# Patient Record
Sex: FEMALE | Race: WHITE | NOT HISPANIC OR LATINO | Employment: OTHER | URBAN - METROPOLITAN AREA
[De-identification: names, ages, dates, MRNs, and addresses within clinical notes are randomized per-mention and may not be internally consistent; named-entity substitution may affect disease eponyms.]

---

## 2020-08-31 ENCOUNTER — APPOINTMENT (OUTPATIENT)
Dept: CT IMAGING | Facility: HOSPITAL | Age: 60
End: 2020-08-31

## 2020-08-31 ENCOUNTER — HOSPITAL ENCOUNTER (INPATIENT)
Facility: HOSPITAL | Age: 60
LOS: 4 days | Discharge: HOME | End: 2020-09-05
Attending: EMERGENCY MEDICINE | Admitting: HOSPITALIST

## 2020-08-31 DIAGNOSIS — R10.12 LEFT UPPER QUADRANT PAIN: ICD-10-CM

## 2020-08-31 DIAGNOSIS — D73.5 SPLENIC INFARCTION: Primary | ICD-10-CM

## 2020-08-31 LAB
ALBUMIN SERPL-MCNC: 4.3 G/DL (ref 3.5–5.2)
ALBUMIN/GLOB SERPL: 1.4 G/DL
ALP SERPL-CCNC: 130 U/L (ref 39–117)
ALT SERPL W P-5'-P-CCNC: 28 U/L (ref 1–33)
ANION GAP SERPL CALCULATED.3IONS-SCNC: 11.4 MMOL/L (ref 5–15)
AST SERPL-CCNC: 17 U/L (ref 1–32)
BACTERIA UR QL AUTO: NORMAL /HPF
BASOPHILS # BLD AUTO: 0.03 10*3/MM3 (ref 0–0.2)
BASOPHILS NFR BLD AUTO: 0.4 % (ref 0–1.5)
BILIRUB SERPL-MCNC: 0.4 MG/DL (ref 0–1.2)
BILIRUB UR QL STRIP: NEGATIVE
BUN SERPL-MCNC: 7 MG/DL (ref 8–23)
BUN/CREAT SERPL: 10.6 (ref 7–25)
CALCIUM SPEC-SCNC: 10 MG/DL (ref 8.6–10.5)
CHLORIDE SERPL-SCNC: 97 MMOL/L (ref 98–107)
CLARITY UR: CLEAR
CO2 SERPL-SCNC: 25.6 MMOL/L (ref 22–29)
COLOR UR: YELLOW
CREAT SERPL-MCNC: 0.66 MG/DL (ref 0.57–1)
DEPRECATED RDW RBC AUTO: 38.7 FL (ref 37–54)
EOSINOPHIL # BLD AUTO: 0.15 10*3/MM3 (ref 0–0.4)
EOSINOPHIL NFR BLD AUTO: 1.8 % (ref 0.3–6.2)
ERYTHROCYTE [DISTWIDTH] IN BLOOD BY AUTOMATED COUNT: 12.2 % (ref 12.3–15.4)
GFR SERPL CREATININE-BSD FRML MDRD: 110 ML/MIN/1.73
GFR SERPL CREATININE-BSD FRML MDRD: 91 ML/MIN/1.73
GLOBULIN UR ELPH-MCNC: 3 GM/DL
GLUCOSE SERPL-MCNC: 267 MG/DL (ref 65–99)
GLUCOSE UR STRIP-MCNC: NEGATIVE MG/DL
HCT VFR BLD AUTO: 40.9 % (ref 34–46.6)
HGB BLD-MCNC: 14.2 G/DL (ref 12–15.9)
HGB UR QL STRIP.AUTO: NEGATIVE
HOLD SPECIMEN: NORMAL
HOLD SPECIMEN: NORMAL
HYALINE CASTS UR QL AUTO: NORMAL /LPF
IMM GRANULOCYTES # BLD AUTO: 0.05 10*3/MM3 (ref 0–0.05)
IMM GRANULOCYTES NFR BLD AUTO: 0.6 % (ref 0–0.5)
KETONES UR QL STRIP: NEGATIVE
LEUKOCYTE ESTERASE UR QL STRIP.AUTO: ABNORMAL
LIPASE SERPL-CCNC: 11 U/L (ref 13–60)
LYMPHOCYTES # BLD AUTO: 2.09 10*3/MM3 (ref 0.7–3.1)
LYMPHOCYTES NFR BLD AUTO: 25 % (ref 19.6–45.3)
MCH RBC QN AUTO: 30.3 PG (ref 26.6–33)
MCHC RBC AUTO-ENTMCNC: 34.7 G/DL (ref 31.5–35.7)
MCV RBC AUTO: 87.4 FL (ref 79–97)
MONOCYTES # BLD AUTO: 0.57 10*3/MM3 (ref 0.1–0.9)
MONOCYTES NFR BLD AUTO: 6.8 % (ref 5–12)
NEUTROPHILS NFR BLD AUTO: 5.46 10*3/MM3 (ref 1.7–7)
NEUTROPHILS NFR BLD AUTO: 65.4 % (ref 42.7–76)
NITRITE UR QL STRIP: NEGATIVE
NRBC BLD AUTO-RTO: 0 /100 WBC (ref 0–0.2)
PH UR STRIP.AUTO: 5.5 [PH] (ref 5–8)
PLATELET # BLD AUTO: 202 10*3/MM3 (ref 140–450)
PMV BLD AUTO: 9.7 FL (ref 6–12)
POTASSIUM SERPL-SCNC: 3.7 MMOL/L (ref 3.5–5.2)
PROT SERPL-MCNC: 7.3 G/DL (ref 6–8.5)
PROT UR QL STRIP: NEGATIVE
RBC # BLD AUTO: 4.68 10*6/MM3 (ref 3.77–5.28)
RBC # UR: NORMAL /HPF
REF LAB TEST METHOD: NORMAL
SODIUM SERPL-SCNC: 134 MMOL/L (ref 136–145)
SP GR UR STRIP: 1.01 (ref 1–1.03)
SQUAMOUS #/AREA URNS HPF: NORMAL /HPF
UROBILINOGEN UR QL STRIP: ABNORMAL
WBC # BLD AUTO: 8.35 10*3/MM3 (ref 3.4–10.8)
WBC UR QL AUTO: NORMAL /HPF
WHOLE BLOOD HOLD SPECIMEN: NORMAL
WHOLE BLOOD HOLD SPECIMEN: NORMAL

## 2020-08-31 PROCEDURE — 25010000002 ONDANSETRON PER 1 MG: Performed by: NURSE PRACTITIONER

## 2020-08-31 PROCEDURE — 81001 URINALYSIS AUTO W/SCOPE: CPT

## 2020-08-31 PROCEDURE — 99285 EMERGENCY DEPT VISIT HI MDM: CPT

## 2020-08-31 PROCEDURE — 74177 CT ABD & PELVIS W/CONTRAST: CPT

## 2020-08-31 PROCEDURE — 83690 ASSAY OF LIPASE: CPT

## 2020-08-31 PROCEDURE — 80053 COMPREHEN METABOLIC PANEL: CPT

## 2020-08-31 PROCEDURE — 85025 COMPLETE CBC W/AUTO DIFF WBC: CPT

## 2020-08-31 RX ORDER — SODIUM CHLORIDE 0.9 % (FLUSH) 0.9 %
10 SYRINGE (ML) INJECTION AS NEEDED
Status: DISCONTINUED | OUTPATIENT
Start: 2020-08-31 | End: 2020-09-05 | Stop reason: HOSPADM

## 2020-08-31 RX ORDER — ONDANSETRON 2 MG/ML
4 INJECTION INTRAMUSCULAR; INTRAVENOUS ONCE
Status: COMPLETED | OUTPATIENT
Start: 2020-08-31 | End: 2020-08-31

## 2020-08-31 RX ORDER — PHENAZOPYRIDINE HYDROCHLORIDE 100 MG/1
100 TABLET, FILM COATED ORAL 3 TIMES DAILY PRN
COMMUNITY
End: 2020-09-05 | Stop reason: HOSPADM

## 2020-08-31 RX ORDER — NITROFURANTOIN 25; 75 MG/1; MG/1
100 CAPSULE ORAL 2 TIMES DAILY
COMMUNITY
End: 2020-09-05 | Stop reason: HOSPADM

## 2020-08-31 RX ORDER — MORPHINE SULFATE 2 MG/ML
4 INJECTION, SOLUTION INTRAMUSCULAR; INTRAVENOUS ONCE
Status: COMPLETED | OUTPATIENT
Start: 2020-08-31 | End: 2020-09-01

## 2020-08-31 RX ORDER — CEPHALEXIN 250 MG/1
250 CAPSULE ORAL 4 TIMES DAILY
COMMUNITY
End: 2020-09-05 | Stop reason: HOSPADM

## 2020-08-31 RX ADMIN — ONDANSETRON 4 MG: 2 INJECTION INTRAMUSCULAR; INTRAVENOUS at 23:34

## 2020-09-01 ENCOUNTER — APPOINTMENT (OUTPATIENT)
Dept: CARDIOLOGY | Facility: HOSPITAL | Age: 60
End: 2020-09-01

## 2020-09-01 PROBLEM — G47.33 OSA (OBSTRUCTIVE SLEEP APNEA): Status: ACTIVE | Noted: 2020-09-01

## 2020-09-01 PROBLEM — E11.9 TYPE 2 DIABETES MELLITUS, WITHOUT LONG-TERM CURRENT USE OF INSULIN: Status: ACTIVE | Noted: 2020-09-01

## 2020-09-01 PROBLEM — D73.5 SPLENIC INFARCTION: Status: ACTIVE | Noted: 2020-09-01

## 2020-09-01 PROBLEM — I73.9 PAD (PERIPHERAL ARTERY DISEASE): Status: ACTIVE | Noted: 2020-09-01

## 2020-09-01 LAB
APTT PPP: 26.2 SECONDS (ref 22.7–35.4)
APTT PPP: 44.7 SECONDS (ref 22.7–35.4)
APTT PPP: 68.5 SECONDS (ref 22.7–35.4)
BASOPHILS # BLD AUTO: 0.02 10*3/MM3 (ref 0–0.2)
BASOPHILS NFR BLD AUTO: 0.3 % (ref 0–1.5)
BH CV ECHO MEAS - ACS: 1.7 CM
BH CV ECHO MEAS - AO MAX PG (FULL): 4.1 MMHG
BH CV ECHO MEAS - AO MAX PG: 8.9 MMHG
BH CV ECHO MEAS - AO MEAN PG (FULL): 1.8 MMHG
BH CV ECHO MEAS - AO MEAN PG: 4.4 MMHG
BH CV ECHO MEAS - AO ROOT AREA (BSA CORRECTED): 1.2
BH CV ECHO MEAS - AO ROOT AREA: 4.9 CM^2
BH CV ECHO MEAS - AO ROOT DIAM: 2.5 CM
BH CV ECHO MEAS - AO V2 MAX: 149.4 CM/SEC
BH CV ECHO MEAS - AO V2 MEAN: 96.3 CM/SEC
BH CV ECHO MEAS - AO V2 VTI: 33.1 CM
BH CV ECHO MEAS - ASC AORTA: 2.5 CM
BH CV ECHO MEAS - AVA(I,A): 2.2 CM^2
BH CV ECHO MEAS - AVA(I,D): 2.2 CM^2
BH CV ECHO MEAS - AVA(V,A): 2.1 CM^2
BH CV ECHO MEAS - AVA(V,D): 2.1 CM^2
BH CV ECHO MEAS - BSA(HAYCOCK): 2.2 M^2
BH CV ECHO MEAS - BSA: 2.1 M^2
BH CV ECHO MEAS - BZI_BMI: 32.5 KILOGRAMS/M^2
BH CV ECHO MEAS - BZI_METRIC_HEIGHT: 172.7 CM
BH CV ECHO MEAS - BZI_METRIC_WEIGHT: 97.1 KG
BH CV ECHO MEAS - EDV(CUBED): 58.2 ML
BH CV ECHO MEAS - EDV(MOD-SP2): 91 ML
BH CV ECHO MEAS - EDV(MOD-SP4): 64 ML
BH CV ECHO MEAS - EDV(TEICH): 65 ML
BH CV ECHO MEAS - EF(CUBED): 76.1 %
BH CV ECHO MEAS - EF(MOD-BP): 68 %
BH CV ECHO MEAS - EF(MOD-SP2): 69.2 %
BH CV ECHO MEAS - EF(MOD-SP4): 67.2 %
BH CV ECHO MEAS - EF(TEICH): 68.8 %
BH CV ECHO MEAS - ESV(CUBED): 13.9 ML
BH CV ECHO MEAS - ESV(MOD-SP2): 28 ML
BH CV ECHO MEAS - ESV(MOD-SP4): 21 ML
BH CV ECHO MEAS - ESV(TEICH): 20.3 ML
BH CV ECHO MEAS - FS: 37.9 %
BH CV ECHO MEAS - IVS/LVPW: 0.96
BH CV ECHO MEAS - IVSD: 1.2 CM
BH CV ECHO MEAS - LAT PEAK E' VEL: 8.7 CM/SEC
BH CV ECHO MEAS - LV DIASTOLIC VOL/BSA (35-75): 30.4 ML/M^2
BH CV ECHO MEAS - LV MASS(C)D: 153.1 GRAMS
BH CV ECHO MEAS - LV MASS(C)DI: 72.8 GRAMS/M^2
BH CV ECHO MEAS - LV MAX PG: 4.9 MMHG
BH CV ECHO MEAS - LV MEAN PG: 2.7 MMHG
BH CV ECHO MEAS - LV SYSTOLIC VOL/BSA (12-30): 10 ML/M^2
BH CV ECHO MEAS - LV V1 MAX: 110.2 CM/SEC
BH CV ECHO MEAS - LV V1 MEAN: 76.5 CM/SEC
BH CV ECHO MEAS - LV V1 VTI: 25.9 CM
BH CV ECHO MEAS - LVIDD: 3.9 CM
BH CV ECHO MEAS - LVIDS: 2.4 CM
BH CV ECHO MEAS - LVLD AP2: 8.1 CM
BH CV ECHO MEAS - LVLD AP4: 8.1 CM
BH CV ECHO MEAS - LVLS AP2: 5.9 CM
BH CV ECHO MEAS - LVLS AP4: 6.2 CM
BH CV ECHO MEAS - LVOT AREA (M): 2.8 CM^2
BH CV ECHO MEAS - LVOT AREA: 2.9 CM^2
BH CV ECHO MEAS - LVOT DIAM: 1.9 CM
BH CV ECHO MEAS - LVPWD: 1.2 CM
BH CV ECHO MEAS - MED PEAK E' VEL: 6.9 CM/SEC
BH CV ECHO MEAS - MV A DUR: 0.13 SEC
BH CV ECHO MEAS - MV A MAX VEL: 68.9 CM/SEC
BH CV ECHO MEAS - MV DEC SLOPE: 445 CM/SEC^2
BH CV ECHO MEAS - MV DEC TIME: 194 SEC
BH CV ECHO MEAS - MV E MAX VEL: 86.3 CM/SEC
BH CV ECHO MEAS - MV E/A: 1.3
BH CV ECHO MEAS - MV MAX PG: 3 MMHG
BH CV ECHO MEAS - MV MEAN PG: 0.91 MMHG
BH CV ECHO MEAS - MV P1/2T MAX VEL: 86 CM/SEC
BH CV ECHO MEAS - MV P1/2T: 56.6 MSEC
BH CV ECHO MEAS - MV V2 MAX: 86.9 CM/SEC
BH CV ECHO MEAS - MV V2 MEAN: 42.8 CM/SEC
BH CV ECHO MEAS - MV V2 VTI: 29.6 CM
BH CV ECHO MEAS - MVA P1/2T LCG: 2.6 CM^2
BH CV ECHO MEAS - MVA(P1/2T): 3.9 CM^2
BH CV ECHO MEAS - MVA(VTI): 2.5 CM^2
BH CV ECHO MEAS - PA ACC TIME: 0.15 SEC
BH CV ECHO MEAS - PA MAX PG (FULL): 0.32 MMHG
BH CV ECHO MEAS - PA MAX PG: 2.7 MMHG
BH CV ECHO MEAS - PA PR(ACCEL): 10.1 MMHG
BH CV ECHO MEAS - PA V2 MAX: 82.5 CM/SEC
BH CV ECHO MEAS - PULM A REVS DUR: 0.11 SEC
BH CV ECHO MEAS - PULM A REVS VEL: 26.2 CM/SEC
BH CV ECHO MEAS - PULM DIAS VEL: 35.7 CM/SEC
BH CV ECHO MEAS - PULM S/D: 1.5
BH CV ECHO MEAS - PULM SYS VEL: 54.3 CM/SEC
BH CV ECHO MEAS - RAP SYSTOLE: 8 MMHG
BH CV ECHO MEAS - RV MAX PG: 2.4 MMHG
BH CV ECHO MEAS - RV MEAN PG: 1.3 MMHG
BH CV ECHO MEAS - RV V1 MAX: 77.6 CM/SEC
BH CV ECHO MEAS - RV V1 MEAN: 53.6 CM/SEC
BH CV ECHO MEAS - RV V1 VTI: 15.6 CM
BH CV ECHO MEAS - SI(AO): 77.5 ML/M^2
BH CV ECHO MEAS - SI(CUBED): 21.1 ML/M^2
BH CV ECHO MEAS - SI(LVOT): 35.1 ML/M^2
BH CV ECHO MEAS - SI(MOD-SP2): 29.9 ML/M^2
BH CV ECHO MEAS - SI(MOD-SP4): 20.4 ML/M^2
BH CV ECHO MEAS - SI(TEICH): 21.2 ML/M^2
BH CV ECHO MEAS - SV(AO): 163 ML
BH CV ECHO MEAS - SV(CUBED): 44.3 ML
BH CV ECHO MEAS - SV(LVOT): 73.7 ML
BH CV ECHO MEAS - SV(MOD-SP2): 63 ML
BH CV ECHO MEAS - SV(MOD-SP4): 43 ML
BH CV ECHO MEAS - SV(TEICH): 44.7 ML
BH CV ECHO MEAS - TAPSE (>1.6): 2.1 CM2
BH CV ECHO MEASUREMENTS AVERAGE E/E' RATIO: 11.06
BH CV LOWER VASCULAR LEFT COMMON FEMORAL AUGMENT: NORMAL
BH CV LOWER VASCULAR LEFT COMMON FEMORAL COMPETENT: NORMAL
BH CV LOWER VASCULAR LEFT COMMON FEMORAL COMPRESS: NORMAL
BH CV LOWER VASCULAR LEFT COMMON FEMORAL PHASIC: NORMAL
BH CV LOWER VASCULAR LEFT COMMON FEMORAL SPONT: NORMAL
BH CV LOWER VASCULAR LEFT DISTAL FEMORAL COMPRESS: NORMAL
BH CV LOWER VASCULAR LEFT GASTRONEMIUS COMPRESS: NORMAL
BH CV LOWER VASCULAR LEFT GREATER SAPH AK COMPRESS: NORMAL
BH CV LOWER VASCULAR LEFT GREATER SAPH BK COMPRESS: NORMAL
BH CV LOWER VASCULAR LEFT LESSER SAPH COMPRESS: NORMAL
BH CV LOWER VASCULAR LEFT MID FEMORAL AUGMENT: NORMAL
BH CV LOWER VASCULAR LEFT MID FEMORAL COMPETENT: NORMAL
BH CV LOWER VASCULAR LEFT MID FEMORAL COMPRESS: NORMAL
BH CV LOWER VASCULAR LEFT MID FEMORAL PHASIC: NORMAL
BH CV LOWER VASCULAR LEFT MID FEMORAL SPONT: NORMAL
BH CV LOWER VASCULAR LEFT PERONEAL COMPRESS: NORMAL
BH CV LOWER VASCULAR LEFT POPLITEAL AUGMENT: NORMAL
BH CV LOWER VASCULAR LEFT POPLITEAL COMPETENT: NORMAL
BH CV LOWER VASCULAR LEFT POPLITEAL COMPRESS: NORMAL
BH CV LOWER VASCULAR LEFT POPLITEAL PHASIC: NORMAL
BH CV LOWER VASCULAR LEFT POPLITEAL SPONT: NORMAL
BH CV LOWER VASCULAR LEFT POSTERIOR TIBIAL COMPRESS: NORMAL
BH CV LOWER VASCULAR LEFT PROFUNDA FEMORAL COMPRESS: NORMAL
BH CV LOWER VASCULAR LEFT PROXIMAL FEMORAL COMPRESS: NORMAL
BH CV LOWER VASCULAR LEFT SAPHENOFEMORAL JUNCTION COMPRESS: NORMAL
BH CV LOWER VASCULAR RIGHT COMMON FEMORAL AUGMENT: NORMAL
BH CV LOWER VASCULAR RIGHT COMMON FEMORAL COMPETENT: NORMAL
BH CV LOWER VASCULAR RIGHT COMMON FEMORAL COMPRESS: NORMAL
BH CV LOWER VASCULAR RIGHT COMMON FEMORAL PHASIC: NORMAL
BH CV LOWER VASCULAR RIGHT COMMON FEMORAL SPONT: NORMAL
BH CV LOWER VASCULAR RIGHT DISTAL FEMORAL COMPRESS: NORMAL
BH CV LOWER VASCULAR RIGHT GASTRONEMIUS COMPRESS: NORMAL
BH CV LOWER VASCULAR RIGHT GREATER SAPH AK COMPRESS: NORMAL
BH CV LOWER VASCULAR RIGHT GREATER SAPH BK COMPRESS: NORMAL
BH CV LOWER VASCULAR RIGHT LESSER SAPH COMPRESS: NORMAL
BH CV LOWER VASCULAR RIGHT MID FEMORAL AUGMENT: NORMAL
BH CV LOWER VASCULAR RIGHT MID FEMORAL COMPETENT: NORMAL
BH CV LOWER VASCULAR RIGHT MID FEMORAL COMPRESS: NORMAL
BH CV LOWER VASCULAR RIGHT MID FEMORAL PHASIC: NORMAL
BH CV LOWER VASCULAR RIGHT MID FEMORAL SPONT: NORMAL
BH CV LOWER VASCULAR RIGHT PERONEAL COMPRESS: NORMAL
BH CV LOWER VASCULAR RIGHT POPLITEAL AUGMENT: NORMAL
BH CV LOWER VASCULAR RIGHT POPLITEAL COMPETENT: NORMAL
BH CV LOWER VASCULAR RIGHT POPLITEAL COMPRESS: NORMAL
BH CV LOWER VASCULAR RIGHT POPLITEAL PHASIC: NORMAL
BH CV LOWER VASCULAR RIGHT POPLITEAL SPONT: NORMAL
BH CV LOWER VASCULAR RIGHT POSTERIOR TIBIAL COMPRESS: NORMAL
BH CV LOWER VASCULAR RIGHT PROFUNDA FEMORAL COMPRESS: NORMAL
BH CV LOWER VASCULAR RIGHT PROXIMAL FEMORAL COMPRESS: NORMAL
BH CV LOWER VASCULAR RIGHT SAPHENOFEMORAL JUNCTION COMPRESS: NORMAL
BH CV VAS BP RIGHT ARM: NORMAL MMHG
BH CV XLRA - RV BASE: 2.9 CM
BH CV XLRA - RV LENGTH: 7.2 CM
BH CV XLRA - RV MID: 2.5 CM
BH CV XLRA - TDI S': 13.9 CM/SEC
D DIMER PPP FEU-MCNC: <0.27 MCGFEU/ML (ref 0–0.49)
DEPRECATED RDW RBC AUTO: 41.1 FL (ref 37–54)
EOSINOPHIL # BLD AUTO: 0.11 10*3/MM3 (ref 0–0.4)
EOSINOPHIL NFR BLD AUTO: 1.6 % (ref 0.3–6.2)
ERYTHROCYTE [DISTWIDTH] IN BLOOD BY AUTOMATED COUNT: 12.6 % (ref 12.3–15.4)
F5 GENE MUT ANL BLD/T: NORMAL
GLUCOSE BLDC GLUCOMTR-MCNC: 250 MG/DL (ref 70–130)
HCT VFR BLD AUTO: 37.7 % (ref 34–46.6)
HCYS SERPL-MCNC: 7.2 UMOL/L (ref 0–15)
HGB BLD-MCNC: 12.7 G/DL (ref 12–15.9)
IMM GRANULOCYTES # BLD AUTO: 0.03 10*3/MM3 (ref 0–0.05)
IMM GRANULOCYTES NFR BLD AUTO: 0.4 % (ref 0–0.5)
INR PPP: 0.98 (ref 0.9–1.1)
LEFT ATRIUM VOLUME INDEX: 15 ML/M2
LYMPHOCYTES # BLD AUTO: 1.67 10*3/MM3 (ref 0.7–3.1)
LYMPHOCYTES NFR BLD AUTO: 24.2 % (ref 19.6–45.3)
MAXIMAL PREDICTED HEART RATE: 158 BPM
MCH RBC QN AUTO: 30 PG (ref 26.6–33)
MCHC RBC AUTO-ENTMCNC: 33.7 G/DL (ref 31.5–35.7)
MCV RBC AUTO: 89.1 FL (ref 79–97)
MONOCYTES # BLD AUTO: 0.45 10*3/MM3 (ref 0.1–0.9)
MONOCYTES NFR BLD AUTO: 6.5 % (ref 5–12)
NEUTROPHILS NFR BLD AUTO: 4.63 10*3/MM3 (ref 1.7–7)
NEUTROPHILS NFR BLD AUTO: 67 % (ref 42.7–76)
NRBC BLD AUTO-RTO: 0 /100 WBC (ref 0–0.2)
PLATELET # BLD AUTO: 184 10*3/MM3 (ref 140–450)
PMV BLD AUTO: 9.4 FL (ref 6–12)
PROTHROMBIN TIME: 12.9 SECONDS (ref 11.7–14.2)
RBC # BLD AUTO: 4.23 10*6/MM3 (ref 3.77–5.28)
REF LAB TEST METHOD: NORMAL
SARS-COV-2 RNA RESP QL NAA+PROBE: NOT DETECTED
STRESS TARGET HR: 134 BPM
WBC # BLD AUTO: 6.91 10*3/MM3 (ref 3.4–10.8)

## 2020-09-01 PROCEDURE — 93970 EXTREMITY STUDY: CPT

## 2020-09-01 PROCEDURE — 83520 IMMUNOASSAY QUANT NOS NONAB: CPT | Performed by: NURSE PRACTITIONER

## 2020-09-01 PROCEDURE — 25010000002 MORPHINE PER 10 MG: Performed by: NURSE PRACTITIONER

## 2020-09-01 PROCEDURE — 85705 THROMBOPLASTIN INHIBITION: CPT | Performed by: NURSE PRACTITIONER

## 2020-09-01 PROCEDURE — 36415 COLL VENOUS BLD VENIPUNCTURE: CPT | Performed by: NURSE PRACTITIONER

## 2020-09-01 PROCEDURE — 25010000002 ONDANSETRON PER 1 MG: Performed by: NURSE PRACTITIONER

## 2020-09-01 PROCEDURE — 85730 THROMBOPLASTIN TIME PARTIAL: CPT | Performed by: INTERNAL MEDICINE

## 2020-09-01 PROCEDURE — 85025 COMPLETE CBC W/AUTO DIFF WBC: CPT | Performed by: NURSE PRACTITIONER

## 2020-09-01 PROCEDURE — 85730 THROMBOPLASTIN TIME PARTIAL: CPT | Performed by: NURSE PRACTITIONER

## 2020-09-01 PROCEDURE — 83090 ASSAY OF HOMOCYSTEINE: CPT | Performed by: NURSE PRACTITIONER

## 2020-09-01 PROCEDURE — 25010000002 MORPHINE PER 10 MG: Performed by: INTERNAL MEDICINE

## 2020-09-01 PROCEDURE — 85613 RUSSELL VIPER VENOM DILUTED: CPT | Performed by: NURSE PRACTITIONER

## 2020-09-01 PROCEDURE — 85300 ANTITHROMBIN III ACTIVITY: CPT | Performed by: NURSE PRACTITIONER

## 2020-09-01 PROCEDURE — 85610 PROTHROMBIN TIME: CPT | Performed by: NURSE PRACTITIONER

## 2020-09-01 PROCEDURE — 86147 CARDIOLIPIN ANTIBODY EA IG: CPT | Performed by: NURSE PRACTITIONER

## 2020-09-01 PROCEDURE — 93010 ELECTROCARDIOGRAM REPORT: CPT | Performed by: INTERNAL MEDICINE

## 2020-09-01 PROCEDURE — 85379 FIBRIN DEGRADATION QUANT: CPT | Performed by: NURSE PRACTITIONER

## 2020-09-01 PROCEDURE — 93005 ELECTROCARDIOGRAM TRACING: CPT | Performed by: NURSE PRACTITIONER

## 2020-09-01 PROCEDURE — 99222 1ST HOSP IP/OBS MODERATE 55: CPT | Performed by: STUDENT IN AN ORGANIZED HEALTH CARE EDUCATION/TRAINING PROGRAM

## 2020-09-01 PROCEDURE — 25010000002 IOPAMIDOL 61 % SOLUTION: Performed by: EMERGENCY MEDICINE

## 2020-09-01 PROCEDURE — 93306 TTE W/DOPPLER COMPLETE: CPT

## 2020-09-01 PROCEDURE — 86038 ANTINUCLEAR ANTIBODIES: CPT | Performed by: NURSE PRACTITIONER

## 2020-09-01 PROCEDURE — 85670 THROMBIN TIME PLASMA: CPT | Performed by: NURSE PRACTITIONER

## 2020-09-01 PROCEDURE — 81241 F5 GENE: CPT | Performed by: NURSE PRACTITIONER

## 2020-09-01 PROCEDURE — 93306 TTE W/DOPPLER COMPLETE: CPT | Performed by: INTERNAL MEDICINE

## 2020-09-01 PROCEDURE — U0004 COV-19 TEST NON-CDC HGH THRU: HCPCS | Performed by: NURSE PRACTITIONER

## 2020-09-01 PROCEDURE — 25010000002 HEPARIN (PORCINE) PER 1000 UNITS: Performed by: NURSE PRACTITIONER

## 2020-09-01 PROCEDURE — 82962 GLUCOSE BLOOD TEST: CPT

## 2020-09-01 PROCEDURE — 85732 THROMBOPLASTIN TIME PARTIAL: CPT | Performed by: NURSE PRACTITIONER

## 2020-09-01 RX ORDER — MORPHINE SULFATE 2 MG/ML
2 INJECTION, SOLUTION INTRAMUSCULAR; INTRAVENOUS ONCE
Status: COMPLETED | OUTPATIENT
Start: 2020-09-01 | End: 2020-09-01

## 2020-09-01 RX ORDER — NALOXONE HCL 0.4 MG/ML
0.4 VIAL (ML) INJECTION
Status: DISCONTINUED | OUTPATIENT
Start: 2020-09-01 | End: 2020-09-05 | Stop reason: HOSPADM

## 2020-09-01 RX ORDER — MORPHINE SULFATE 2 MG/ML
2 INJECTION, SOLUTION INTRAMUSCULAR; INTRAVENOUS EVERY 4 HOURS PRN
Status: DISCONTINUED | OUTPATIENT
Start: 2020-09-01 | End: 2020-09-02

## 2020-09-01 RX ORDER — NICOTINE POLACRILEX 4 MG
15 LOZENGE BUCCAL
Status: DISCONTINUED | OUTPATIENT
Start: 2020-09-01 | End: 2020-09-05 | Stop reason: HOSPADM

## 2020-09-01 RX ORDER — ONDANSETRON 2 MG/ML
4 INJECTION INTRAMUSCULAR; INTRAVENOUS EVERY 6 HOURS PRN
Status: DISCONTINUED | OUTPATIENT
Start: 2020-09-01 | End: 2020-09-02

## 2020-09-01 RX ORDER — OXYCODONE HYDROCHLORIDE AND ACETAMINOPHEN 5; 325 MG/1; MG/1
1 TABLET ORAL EVERY 4 HOURS PRN
Status: DISCONTINUED | OUTPATIENT
Start: 2020-09-01 | End: 2020-09-05 | Stop reason: HOSPADM

## 2020-09-01 RX ORDER — MORPHINE SULFATE 2 MG/ML
1 INJECTION, SOLUTION INTRAMUSCULAR; INTRAVENOUS EVERY 4 HOURS PRN
Status: DISCONTINUED | OUTPATIENT
Start: 2020-09-01 | End: 2020-09-01

## 2020-09-01 RX ORDER — MULTIPLE VITAMINS W/ MINERALS TAB 9MG-400MCG
1 TAB ORAL DAILY
Status: DISCONTINUED | OUTPATIENT
Start: 2020-09-01 | End: 2020-09-05 | Stop reason: HOSPADM

## 2020-09-01 RX ORDER — ASPIRIN 81 MG/1
81 TABLET ORAL DAILY
Status: DISCONTINUED | OUTPATIENT
Start: 2020-09-01 | End: 2020-09-05

## 2020-09-01 RX ORDER — DEXTROSE MONOHYDRATE 25 G/50ML
25 INJECTION, SOLUTION INTRAVENOUS
Status: DISCONTINUED | OUTPATIENT
Start: 2020-09-01 | End: 2020-09-05 | Stop reason: HOSPADM

## 2020-09-01 RX ORDER — ALBUTEROL SULFATE 90 UG/1
1-2 AEROSOL, METERED RESPIRATORY (INHALATION) EVERY 6 HOURS PRN
Status: ON HOLD | COMMUNITY
Start: 2020-01-24 | End: 2020-09-01

## 2020-09-01 RX ORDER — ACETAMINOPHEN 160 MG/5ML
650 SOLUTION ORAL EVERY 4 HOURS PRN
Status: DISCONTINUED | OUTPATIENT
Start: 2020-09-01 | End: 2020-09-05 | Stop reason: HOSPADM

## 2020-09-01 RX ORDER — ACETAMINOPHEN 650 MG/1
650 SUPPOSITORY RECTAL EVERY 4 HOURS PRN
Status: DISCONTINUED | OUTPATIENT
Start: 2020-09-01 | End: 2020-09-05 | Stop reason: HOSPADM

## 2020-09-01 RX ORDER — ACETAMINOPHEN 325 MG/1
650 TABLET ORAL EVERY 4 HOURS PRN
Status: DISCONTINUED | OUTPATIENT
Start: 2020-09-01 | End: 2020-09-05 | Stop reason: HOSPADM

## 2020-09-01 RX ORDER — ACETAMINOPHEN 500 MG
1000 TABLET ORAL ONCE
Status: COMPLETED | OUTPATIENT
Start: 2020-09-01 | End: 2020-09-01

## 2020-09-01 RX ORDER — HEPARIN SODIUM 10000 [USP'U]/100ML
10.3 INJECTION, SOLUTION INTRAVENOUS
Status: DISCONTINUED | OUTPATIENT
Start: 2020-09-01 | End: 2020-09-05

## 2020-09-01 RX ORDER — PANTOPRAZOLE SODIUM 40 MG/1
40 TABLET, DELAYED RELEASE ORAL
Status: DISCONTINUED | OUTPATIENT
Start: 2020-09-02 | End: 2020-09-05 | Stop reason: HOSPADM

## 2020-09-01 RX ORDER — SODIUM CHLORIDE 0.9 % (FLUSH) 0.9 %
10 SYRINGE (ML) INJECTION EVERY 12 HOURS SCHEDULED
Status: DISCONTINUED | OUTPATIENT
Start: 2020-09-01 | End: 2020-09-05 | Stop reason: HOSPADM

## 2020-09-01 RX ORDER — NITROGLYCERIN 0.4 MG/1
0.4 TABLET SUBLINGUAL
Status: DISCONTINUED | OUTPATIENT
Start: 2020-09-01 | End: 2020-09-05 | Stop reason: HOSPADM

## 2020-09-01 RX ORDER — SODIUM CHLORIDE 0.9 % (FLUSH) 0.9 %
10 SYRINGE (ML) INJECTION AS NEEDED
Status: DISCONTINUED | OUTPATIENT
Start: 2020-09-01 | End: 2020-09-05 | Stop reason: HOSPADM

## 2020-09-01 RX ORDER — SODIUM CHLORIDE 9 MG/ML
100 INJECTION, SOLUTION INTRAVENOUS CONTINUOUS
Status: DISCONTINUED | OUTPATIENT
Start: 2020-09-01 | End: 2020-09-03

## 2020-09-01 RX ADMIN — MORPHINE SULFATE 1 MG: 2 INJECTION, SOLUTION INTRAMUSCULAR; INTRAVENOUS at 09:58

## 2020-09-01 RX ADMIN — MORPHINE SULFATE 1 MG: 2 INJECTION, SOLUTION INTRAMUSCULAR; INTRAVENOUS at 06:00

## 2020-09-01 RX ADMIN — ASPIRIN 81 MG: 81 TABLET, COATED ORAL at 19:42

## 2020-09-01 RX ADMIN — MORPHINE SULFATE 2 MG: 2 INJECTION, SOLUTION INTRAMUSCULAR; INTRAVENOUS at 02:37

## 2020-09-01 RX ADMIN — ONDANSETRON 4 MG: 2 INJECTION INTRAMUSCULAR; INTRAVENOUS at 15:30

## 2020-09-01 RX ADMIN — ACETAMINOPHEN 650 MG: 325 TABLET, FILM COATED ORAL at 11:52

## 2020-09-01 RX ADMIN — SODIUM CHLORIDE 100 ML/HR: 9 INJECTION, SOLUTION INTRAVENOUS at 04:36

## 2020-09-01 RX ADMIN — OXYCODONE HYDROCHLORIDE AND ACETAMINOPHEN 1 TABLET: 5; 325 TABLET ORAL at 15:31

## 2020-09-01 RX ADMIN — IOPAMIDOL 100 ML: 612 INJECTION, SOLUTION INTRAVENOUS at 00:09

## 2020-09-01 RX ADMIN — ONDANSETRON 4 MG: 2 INJECTION INTRAMUSCULAR; INTRAVENOUS at 23:14

## 2020-09-01 RX ADMIN — OXYCODONE HYDROCHLORIDE AND ACETAMINOPHEN 1 TABLET: 5; 325 TABLET ORAL at 19:42

## 2020-09-01 RX ADMIN — MORPHINE SULFATE 2 MG: 2 INJECTION, SOLUTION INTRAMUSCULAR; INTRAVENOUS at 12:20

## 2020-09-01 RX ADMIN — ONDANSETRON 4 MG: 2 INJECTION INTRAMUSCULAR; INTRAVENOUS at 10:02

## 2020-09-01 RX ADMIN — SODIUM CHLORIDE 500 ML: 9 INJECTION, SOLUTION INTRAVENOUS at 02:22

## 2020-09-01 RX ADMIN — HEPARIN SODIUM 13.3 UNITS/KG/HR: 10000 INJECTION, SOLUTION INTRAVENOUS at 21:28

## 2020-09-01 RX ADMIN — MORPHINE SULFATE 2 MG: 2 INJECTION, SOLUTION INTRAMUSCULAR; INTRAVENOUS at 23:14

## 2020-09-01 RX ADMIN — SODIUM CHLORIDE, PRESERVATIVE FREE 10 ML: 5 INJECTION INTRAVENOUS at 10:02

## 2020-09-01 RX ADMIN — ACETAMINOPHEN 1000 MG: 500 TABLET ORAL at 00:31

## 2020-09-01 RX ADMIN — HEPARIN SODIUM 10.3 UNITS/KG/HR: 10000 INJECTION, SOLUTION INTRAVENOUS at 02:56

## 2020-09-01 NOTE — ED PROVIDER NOTES
The JERMAINE and I have discussed this patients history, physical exam, and treatment plan. I have reviewed the documentation and personally had a face to face interaction with the patient. I affirm the documentation and agree with the treatment and plan.  The following note describes my personal findings    This patient is a 60-year-old female presenting to the emergency department today with left flank pain that began 2 to 3 days ago.  The patient states that she has had a known urinary tract infection over the past 2 weeks and has just finished a prescription for Keflex secondary to this.  She states that the pain is in the left flank and radiates into the left upper abdomen.  The patient denies any associated fevers or chills.    Exam: This patient is resting comfortably and in no distress, without gross neurological deficit.  She is afebrile with stable vital signs and nontoxic in appearance.  There is no CVA tenderness.  Abdomen is soft with tenderness to palpation to the left upper quadrant.  There is no rebound and no guarding present.    Plan: The patient's laboratory results are all unremarkable as well as is the urinalysis.  CT scan with IV contrast does show an area of splenic infarction.  This case will be discussed with general surgery and she will require admission to the hospital for further work-up and management.      The patient was wearing a facemask upon entrance into the room and remained in such throughout their visit.  I was wearing PPE including a facemask, eye protection, as well as gloves at any point entering the room and throughout the visit     Vineet Contreras MD  09/01/20 0147

## 2020-09-01 NOTE — ED NOTES
Placed pt on 1L of O2 per request since she was going to sleep and her O2 drops.      Mary Mendez, HOWARD  09/01/20 2638

## 2020-09-01 NOTE — CONSULTS
GENERAL SURGERY HISTORY AND PHYSICAL     SUMMARY (A/P):    Mrs. Narcisa Hammer is a 60 y.o. year old lady who presents with a splenic infarction. No signs of abscess or infection; no surgical intervention planned at this point.  Hypercoagulable work-up pending; lower extremity duplex pending; on a heparin drip. Will continue to follow.     CC:    L flank pain     HPI:    Ms. Narcisa Hammer is a 60 y.o. year old lady who presents with left flank pain.  She states this started on Saturday and has continued to worsen since then.  Mild nausea, no vomiting.  Last bowel movement was yesterday.  No prior history of clotting problems.    PMH:    • Diabetes  • Sleep apnea  • Peripheral artery disease    PSH:    • None    FAMILY HISTORY:    • No family history of bleeding or clotting disorders    SOCIAL HISTORY:   • Denies tobacco use  • Intermittent alcohol use    ALLERGIES:   • Doxycycline  • Penicillin    MEDICATIONS:   • Reviewed in epic    RADIOLOGY/ENDOSCOPY:    • CT reviewed; infarct of the superior pole of the spleen.  No other abnormality seen on CT.  Vessels appear widely patent.    LABS:    • White blood cell count 6.9, hemoglobin 12.7, platelets 184  • Last night, CO2 25, creatinine 0.6    ROS:   Influenza-like illness: no fever, no  cough, no  sore throat, no  body aches, no loss of sense of taste or smell, no known exposure to person with Covid-19.  Constitutional: Negative for fevers or chills  HENT: Negative for hearing loss or runny nose  Eyes: Negative for vision changes or scleral icterus  Respiratory: Negative for cough or shortness of breath  Cardiovascular: Negative for chest pain or heart palpitations  Gastrointestinal: Positive for severe abdominal pain, nausea; negative forvomiting, constipation, melena, or hematochezia  Genitourinary: Negative for hematuria or dysuria  Musculoskeletal: Negative for joint swelling or gait instability  Neurologic: Negative for tremors or seizures  Psychiatric: Negative for  suicidal ideations or depression  All other systems reviewed and negative    PHYSICAL EXAM:   • Constitutional: Well-developed well-nourished, uncomfortable  • Vital signs: Temperature 97.7 heart rate 59 respiratory rate 17 blood pressure 138/85  • Eyes: Conjunctiva normal, sclera nonicteric  • ENMT: Hearing grossly normal, oral mucosa moist  • Neck: Supple, no palpable mass, trachea midline  • Respiratory: Clear to auscultation, normal inspiratory effort  • Cardiovascular: Regular rate, no peripheral edema, no jugular venous distention  • Gastrointestinal: Soft, tender to palpation in left upper quadrant and left flank, no palpable mass, no hepatosplenomegaly, negative for hernia, no peritoneal signs  • Skin:  Warm, dry, no rash on visualized skin surfaces  • Musculoskeletal: Symmetric strength, normal gait  • Psychiatric: Alert and oriented ×3, normal affect     JLUIS MATHIAS M.D.  General and Endoscopic Surgery  [facility]    [address], [address]  [place], [place], [address]  P: [phone]  F: [phone]

## 2020-09-01 NOTE — ED PROVIDER NOTES
EMERGENCY DEPARTMENT ENCOUNTER    CHIEF COMPLAINT  Chief Complaint: ***  History given by: ***  History limited by: ***  Room Number: 13/13  PMD: No primary care provider on file.      HPI:  Pt is a 60 y.o. female who presents complaining of ***    Duration:  ***  Onset: ***  Timing: ***  Location: ***  Radiation: ***  Quality: ***  Intensity/Severity: ***  Progression: ***  Associated Symptoms: ***  Aggravating Factors: ***  Alleviating Factors: ***  Previous Episodes: ***  Treatment before arrival: ***    PAST MEDICAL HISTORY  Active Ambulatory Problems     Diagnosis Date Noted   • No Active Ambulatory Problems     Resolved Ambulatory Problems     Diagnosis Date Noted   • No Resolved Ambulatory Problems     No Additional Past Medical History       PAST SURGICAL HISTORY  History reviewed. No pertinent surgical history.    FAMILY HISTORY  No family history on file.    SOCIAL HISTORY       ALLERGIES  Doxycycline and Penicillins    REVIEW OF SYSTEMS  Review of Systems    PHYSICAL EXAM  ED Triage Vitals   Temp Heart Rate Resp BP SpO2   08/31/20 2132 08/31/20 2132 08/31/20 2132 08/31/20 2157 08/31/20 2132   97.7 °F (36.5 °C) 91 16 155/99 95 %      Temp src Heart Rate Source Patient Position BP Location FiO2 (%)   08/31/20 2132 08/31/20 2132 08/31/20 2157 08/31/20 2157 --   Tympanic Monitor Standing Left arm        Physical Exam    LAB RESULTS  Lab Results (last 24 hours)     Procedure Component Value Units Date/Time    CBC & Differential [[id number]] Collected:  08/31/20 2227    Specimen:  Blood Updated:  08/31/20 2251    Narrative:       The following orders were created for panel order CBC & Differential.  Procedure                               Abnormality         Status                     ---------                               -----------         ------                     CBC Auto Differential[[id number]]        Abnormal            Final result                 Please view results for these tests on the individual  orders.    Comprehensive Metabolic Panel [[id number]] Collected:  08/31/20 2227    Specimen:  Blood Updated:  08/31/20 2240    Lipase [[id number]] Collected:  08/31/20 2227    Specimen:  Blood Updated:  08/31/20 2240    CBC Auto Differential [[id number]]  (Abnormal) Collected:  08/31/20 2227    Specimen:  Blood Updated:  08/31/20 2251     WBC 8.35 10*3/mm3      RBC 4.68 10*6/mm3      Hemoglobin 14.2 g/dL      Hematocrit 40.9 %      MCV 87.4 fL      MCH 30.3 pg      MCHC 34.7 g/dL      RDW 12.2 %      RDW-SD 38.7 fl      MPV 9.7 fL      Platelets 202 10*3/mm3      Neutrophil % 65.4 %      Lymphocyte % 25.0 %      Monocyte % 6.8 %      Eosinophil % 1.8 %      Basophil % 0.4 %      Immature Grans % 0.6 %      Neutrophils, Absolute 5.46 10*3/mm3      Lymphocytes, Absolute 2.09 10*3/mm3      Monocytes, Absolute 0.57 10*3/mm3      Eosinophils, Absolute 0.15 10*3/mm3      Basophils, Absolute 0.03 10*3/mm3      Immature Grans, Absolute 0.05 10*3/mm3      nRBC 0.0 /100 WBC     Urinalysis With Microscopic If Indicated (No Culture) - Urine, Clean Catch [[id number]]  (Abnormal) Collected:  08/31/20 2235    Specimen:  Urine, Clean Catch Updated:  08/31/20 2259     Color, UA Yellow     Appearance, UA Clear     pH, UA 5.5     Specific Gravity, UA 1.010     Glucose, UA Negative     Ketones, UA Negative     Bilirubin, UA Negative     Blood, UA Negative     Protein, UA Negative     Leuk Esterase, UA Trace     Nitrite, UA Negative     Urobilinogen, UA 0.2 E.U./dL    Urinalysis, Microscopic Only - Urine, Clean Catch [[id number]] Collected:  08/31/20 2235    Specimen:  Urine, Clean Catch Updated:  08/31/20 2259     RBC, UA 0-2 /HPF      WBC, UA 0-2 /HPF      Bacteria, UA None Seen /HPF      Squamous Epithelial Cells, UA 0-2 /HPF      Hyaline Casts, UA 0-2 /LPF      Methodology Automated Microscopy          I ordered the above labs and reviewed the results    RADIOLOGY  No orders to display        I ordered the above noted radiological  studies. Interpreted by radiologist. Discussed with radiologist (***). Reviewed by me in PACS.       PROCEDURES  Procedures      PROGRESS AND CONSULTS     The patient was wearing a facemask upon entrance into the room and remained in such throughout their visit.  I was wearing PPE including a facemask, eye protection, as well as gloves at any point entering the room and throughout the visit      MEDICAL DECISION MAKING  Results were reviewed/discussed with the patient and they were also made aware of online access. Pt also made aware that some labs, such as cultures, will not be resulted during ER visit and follow up with PMD is necessary.     MDM  Number of Diagnoses or Management Options     Amount and/or Complexity of Data Reviewed  Clinical lab tests: ordered and reviewed  Review and summarize past medical records: yes (There are no previous emergency room records available for review)           DIAGNOSIS  Final diagnoses:   None       DISPOSITION  ***    Latest Documented Vital Signs:  As of 23:00  BP- 155/99 HR- 91 Temp- 97.7 °F (36.5 °C) (Tympanic) O2 sat- 95%

## 2020-09-01 NOTE — H&P
"    Patient Name:  Narcisa Hammer  YOB: 1960  MRN:  [id number]  Admit Date:  8/31/2020  Patient Care Team:  Provider, No Known as PCP - General      Subjective   History Present Illness     Chief Complaint   Patient presents with   • Flank Pain     History of Present Illness   Mrs. Clark is a 60-year-old female with history of type 2 diabetes, obstructive sleep apnea, PAD who presents to the emergency room with left-sided flank and abdominal pain.  Patient states she started having this abdominal pain today and has gradually been worsening.  She did have some mild left-sided back pain yesterday, however she is being treated for a urinary tract infection and felt like it was some flank pain, that she has had in the past with UTI.  She just finished a prescription of Keflex for her urinary tract infection.  She denies any fever, chills, shortness of breath at this time, she has been exposed anyone with COVID-19 that she is aware of.  She appears to be in no acute distress.  Patient states nothing makes her pain worse or better, it comes and goes.  She describes the pain as sharp radiating pain.  Patient denies any history of blood clots, she is on no anticoagulation.  Significant family history is that her father has rheumatoid arthritis, her daughter has multiple sclerosis.  She also complains of some left leg \"pins-and-needles\" type feeling for the past 3 weeks, that comes and goes, sometimes worse than others, she states when she is laying in bed at nighttime is when the feeling is the worst.  She denies having any swelling or redness in her legs, no trouble with walking.  In the emergency room patient's glucose 267, sodium 134, potassium 3.7, creatinine 0.66, BUN 7, d-dimer less than 0.27, INR 0.98, PTT 26.2, white blood cell count 6.9, hemoglobin 12.7, hematocrit 37.7, platelets 184.  CT scan of her abdomen and pelvis, the official report is pending, but speaking with the ED provider who spoke to " radiology there is a area of splenic infarction.  Urinalysis is negative.  EKG shows normal sinus rhythm with rate of 66, borderline T abnormalities.    Review of Systems   Constitutional: Positive for chills (today). Negative for activity change, appetite change, fatigue and fever.   HENT: Negative for nosebleeds and trouble swallowing.    Eyes: Negative for photophobia, redness and visual disturbance.   Respiratory: Negative for cough, chest tightness, shortness of breath and wheezing.    Cardiovascular: Negative for chest pain, palpitations and leg swelling.   Gastrointestinal: Positive for abdominal pain (left sdie pain that radiated to mid abdomen). Negative for abdominal distention, nausea and vomiting.   Endocrine: Negative.    Genitourinary: Positive for flank pain.   Musculoskeletal: Negative for gait problem and joint swelling.   Skin: Negative.    Neurological: Positive for numbness (in left lower extermeity over the last 3 weeks, no swelling or redness). Negative for dizziness, seizures, speech difficulty, light-headedness and headaches.   Hematological: Negative.    Psychiatric/Behavioral: Negative for behavioral problems and confusion.        Personal History     Past Medical History:   Diagnosis Date   • Diabetes mellitus (CMS/[facility])    • UTI (urinary tract infection)      History reviewed. No pertinent surgical history.  History reviewed. No pertinent family history.  Social History     Tobacco Use   • Smoking status: Former Smoker     Types: Cigarettes     Last attempt to quit: 2000     Years since quittin.6   Substance Use Topics   • Alcohol use: Yes   • Drug use: Not on file     No current facility-administered medications on file prior to encounter.      Current Outpatient Medications on File Prior to Encounter   Medication Sig Dispense Refill   • cephalexin (KEFLEX) 250 MG capsule Take 250 mg by mouth 4 (Four) Times a Day.     • Multiple Vitamins-Minerals (MULTIVITAMIN ADULT PO) Take 1 tablet  by mouth.     • nitrofurantoin, macrocrystal-monohydrate, (MACROBID) 100 MG capsule Take 100 mg by mouth 2 (Two) Times a Day.     • NON FORMULARY Take 1 tablet by mouth 3 (Three) Times a Day. Melavic supplement for DM     • phenazopyridine (Pyridium) 100 MG tablet Take 100 mg by mouth 3 (Three) Times a Day As Needed for Bladder Spasms.       Allergies   Allergen Reactions   • Doxycycline Hives   • Penicillins Unknown - Low Severity       Objective    Objective     Vital Signs  Temp:  [97.7 °F (36.5 °C)] 97.7 °F (36.5 °C)  Heart Rate:  [74-91] 74  Resp:  [16] 16  BP: (134-156)/(76-99) 134/93  SpO2:  [94 %-96 %] 96 %  on   ;   Device (Oxygen Therapy): room air  Body mass index is 32.69 kg/m².    Physical Exam   Constitutional: She is oriented to person, place, and time. She appears well-developed and well-nourished. No distress.   HENT:   Head: Normocephalic.   Eyes: EOM are normal.   Neck: Normal range of motion. No JVD present.   Cardiovascular: Normal rate and regular rhythm.   Normal sinus rhythm on the monitor with heart rate 78 during my exam, no peripheral edema.   Pulmonary/Chest: Effort normal and breath sounds normal.   Lung sounds clear, sats 98% on room air.   Abdominal: Soft. Bowel sounds are normal. She exhibits no distension. There is tenderness in the left upper quadrant. There is CVA tenderness (left).   Musculoskeletal: Normal range of motion.   Neurological: She is alert and oriented to person, place, and time. She has normal strength.   No focal neuro deficits   Skin: Skin is warm and dry. Capillary refill takes less than 2 seconds.   Psychiatric: Her speech is normal and behavior is normal. Judgment and thought content normal. Her mood appears anxious. Cognition and memory are normal.   Nursing note and vitals reviewed.      Results Review:  I reviewed the patient's new clinical results.  I reviewed the patient's new imaging results and agree with the interpretation.  I reviewed the patient's  other test results and agree with the interpretation  I personally viewed and interpreted the patient's EKG/Telemetry data  Discussed with ED provider.    Lab Results (last 24 hours)     Procedure Component Value Units Date/Time    CBC & Differential [[id number]] Collected:  08/31/20 2227    Specimen:  Blood Updated:  08/31/20 2251    Narrative:       The following orders were created for panel order CBC & Differential.  Procedure                               Abnormality         Status                     ---------                               -----------         ------                     CBC Auto Differential[[id number]]        Abnormal            Final result                 Please view results for these tests on the individual orders.    Comprehensive Metabolic Panel [[id number]]  (Abnormal) Collected:  08/31/20 2227    Specimen:  Blood Updated:  08/31/20 2300     Glucose 267 mg/dL      BUN 7 mg/dL      Creatinine 0.66 mg/dL      Sodium 134 mmol/L      Potassium 3.7 mmol/L      Chloride 97 mmol/L      CO2 25.6 mmol/L      Calcium 10.0 mg/dL      Total Protein 7.3 g/dL      Albumin 4.30 g/dL      ALT (SGPT) 28 U/L      AST (SGOT) 17 U/L      Alkaline Phosphatase 130 U/L      Total Bilirubin 0.4 mg/dL      eGFR Non African Amer 91 mL/min/1.73      eGFR  African Amer 110 mL/min/1.73      Globulin 3.0 gm/dL      A/G Ratio 1.4 g/dL      BUN/Creatinine Ratio 10.6     Anion Gap 11.4 mmol/L     Narrative:       GFR Normal >60  Chronic Kidney Disease <60  Kidney Failure <15      Lipase [[id number]]  (Abnormal) Collected:  08/31/20 2227    Specimen:  Blood Updated:  08/31/20 2300     Lipase 11 U/L     CBC Auto Differential [[id number]]  (Abnormal) Collected:  08/31/20 2227    Specimen:  Blood Updated:  08/31/20 2251     WBC 8.35 10*3/mm3      RBC 4.68 10*6/mm3      Hemoglobin 14.2 g/dL      Hematocrit 40.9 %      MCV 87.4 fL      MCH 30.3 pg      MCHC 34.7 g/dL      RDW 12.2 %      RDW-SD 38.7 fl      MPV 9.7 fL       Platelets 202 10*3/mm3      Neutrophil % 65.4 %      Lymphocyte % 25.0 %      Monocyte % 6.8 %      Eosinophil % 1.8 %      Basophil % 0.4 %      Immature Grans % 0.6 %      Neutrophils, Absolute 5.46 10*3/mm3      Lymphocytes, Absolute 2.09 10*3/mm3      Monocytes, Absolute 0.57 10*3/mm3      Eosinophils, Absolute 0.15 10*3/mm3      Basophils, Absolute 0.03 10*3/mm3      Immature Grans, Absolute 0.05 10*3/mm3      nRBC 0.0 /100 WBC     Urinalysis With Microscopic If Indicated (No Culture) - Urine, Clean Catch [[id number]]  (Abnormal) Collected:  08/31/20 2235    Specimen:  Urine, Clean Catch Updated:  08/31/20 2259     Color, UA Yellow     Appearance, UA Clear     pH, UA 5.5     Specific Gravity, UA 1.010     Glucose, UA Negative     Ketones, UA Negative     Bilirubin, UA Negative     Blood, UA Negative     Protein, UA Negative     Leuk Esterase, UA Trace     Nitrite, UA Negative     Urobilinogen, UA 0.2 E.U./dL    Urinalysis, Microscopic Only - Urine, Clean Catch [[id number]] Collected:  08/31/20 2235    Specimen:  Urine, Clean Catch Updated:  08/31/20 2259     RBC, UA 0-2 /HPF      WBC, UA 0-2 /HPF      Bacteria, UA None Seen /HPF      Squamous Epithelial Cells, UA 0-2 /HPF      Hyaline Casts, UA 0-2 /LPF      Methodology Automated Microscopy    Protime-INR [[id number]]  (Normal) Collected:  09/01/20 0236    Specimen:  Blood Updated:  09/01/20 0334     Protime 12.9 Seconds      INR 0.98    CBC & Differential [[id number]] Collected:  09/01/20 0236    Specimen:  Blood Updated:  09/01/20 0246    Narrative:       The following orders were created for panel order CBC & Differential.  Procedure                               Abnormality         Status                     ---------                               -----------         ------                     CBC Auto Differential[[id number]]        Normal              Final result                 Please view results for these tests on the individual orders.    aPTT [[id number]]   (Normal) Collected:  09/01/20 0236    Specimen:  Blood Updated:  09/01/20 0334     PTT 26.2 seconds     D-dimer, Quantitative [[id number]]  (Normal) Collected:  09/01/20 0236    Specimen:  Blood Updated:  09/01/20 0334     D-Dimer, Quantitative <0.27 MCGFEU/mL     Narrative:       The Stago D-Dimer test used in conjunction with a clinical pretest probability (PTP) assessment model, has been approved by the FDA to rule out the presence of venous thromboembolism (VTE) in outpatients suspected of deep venous thrombosis (DVT) or pulmonary embolism (PE). The cut-off for negative predictive value is <0.50 MCGFEU/mL.    CBC Auto Differential [[id number]]  (Normal) Collected:  09/01/20 0236    Specimen:  Blood Updated:  09/01/20 0246     WBC 6.91 10*3/mm3      RBC 4.23 10*6/mm3      Hemoglobin 12.7 g/dL      Hematocrit 37.7 %      MCV 89.1 fL      MCH 30.0 pg      MCHC 33.7 g/dL      RDW 12.6 %      RDW-SD 41.1 fl      MPV 9.4 fL      Platelets 184 10*3/mm3      Neutrophil % 67.0 %      Lymphocyte % 24.2 %      Monocyte % 6.5 %      Eosinophil % 1.6 %      Basophil % 0.3 %      Immature Grans % 0.4 %      Neutrophils, Absolute 4.63 10*3/mm3      Lymphocytes, Absolute 1.67 10*3/mm3      Monocytes, Absolute 0.45 10*3/mm3      Eosinophils, Absolute 0.11 10*3/mm3      Basophils, Absolute 0.02 10*3/mm3      Immature Grans, Absolute 0.03 10*3/mm3      nRBC 0.0 /100 WBC     COVID PRE-OP / PRE-PROCEDURE SCREENING ORDER (NO ISOLATION) - Swab, Nasopharynx [[id number]] Collected:  09/01/20 0259    Specimen:  Swab from Nasopharynx Updated:  09/01/20 0343    Narrative:       The following orders were created for panel order COVID PRE-OP / PRE-PROCEDURE SCREENING ORDER (NO ISOLATION) - Swab, Nasopharynx.  Procedure                               Abnormality         Status                     ---------                               -----------         ------                     COVID-19,BIOTAP, NP/OP S...[[id number]]                      In  process                   Please view results for these tests on the individual orders.    COVID-19,BIOTAP, NP/OP SWAB IN TRANSPORT MEDIA OR SALINE 24-36 HR TAT - Swab, Nasopharynx [[id number]] Collected:  09/01/20 0259    Specimen:  Swab from Nasopharynx Updated:  09/01/20 0343    RICARDO [[id number]] Collected:  09/01/20 0552    Specimen:  Blood Updated:  09/01/20 0559    Antiphosphotidyl Antibodies Panel II [[id number]] Collected:  09/01/20 0552    Specimen:  Blood Updated:  09/01/20 0559    Anticardiolipin Antibody, IgG / M, Qn [[id number]] Collected:  09/01/20 0552    Specimen:  Blood Updated:  09/01/20 0559    Antithrombin III [[id number]] Collected:  09/01/20 0552    Specimen:  Blood Updated:  09/01/20 0600    Factor 5 Leiden [[id number]] Collected:  09/01/20 0552    Specimen:  Blood Updated:  09/01/20 0600    Homocysteine [[id number]] Collected:  09/01/20 0552    Specimen:  Blood Updated:  09/01/20 0559    Lupus Anticoagulant [[id number]] Collected:  09/01/20 0552    Specimen:  Blood Updated:  09/01/20 0559          Imaging Results (Last 24 Hours)     Procedure Component Value Units Date/Time    CT Abdomen Pelvis With Contrast [[id number]] Resulted:  09/01/20 0010     Updated:  09/01/20 0011               ECG 12 Lead   Preliminary Result   HEART RATE= 66  bpm   RR Interval= 904  ms   AR Interval= 169  ms   P Horizontal Axis= 15  deg   P Front Axis= 65  deg   QRSD Interval= 92  ms   QT Interval= 412  ms   QRS Axis= -12  deg   T Wave Axis= -3  deg   - BORDERLINE ECG -   Sinus rhythm   Borderline T abnormalities, inferior leads   Electronically Signed By:    Date and Time of Study: 2020-09-01 03:11:20           Assessment/Plan     Active Hospital Problems    Diagnosis POA   • **Splenic infarction [D73.5] Yes   • Type 2 diabetes mellitus, without long-term current use of insulin (CMS/HCC) [E11.9] Unknown   • PAD (peripheral artery disease) (CMS/HCC) [I73.9] Unknown   • CHRISTIANA (obstructive sleep apnea) [G47.33] Unknown     Mrs.  Amber is a 60-year-old female with history of type 2 diabetes, obstructive sleep apnea, PAD who presents to the emergency room with left-sided flank and abdominal pain.     Splenic infarct/PAD  -Started on heparin drip, per protocol  -Consult general surgery, ED did speak with Dr. Rdz who will see patient  -Awaiting on official CT of the abdomen, but according to ED report from radiology it is positive for area of splenic infarction  -D-dimer less than 0.27, INR 1.98  -We will obtain lower extremity Dopplers, patient is complaining of some numbness daily in her left leg however no swelling or redness  -2D echo  -Patient denies any history of blood clots, however her father does have rheumatoid arthritis and her daughter has multiple sclerosis.  -We will get some hypercoagulability labs, may need hematology consult depending on clinical course and lab results  -Patient denies any pain at this time, she does not appear to be acutely ill at this time    Type 2 diabetes  -Accu-Cheks before meals and at bedtime with correctional dose insulin  -Check A1c    Obstructive sleep apnea  -Continuous pulse ox symmetry, O2 to keep sats above 92    · I discussed the patient's findings and my recommendations with patient, nursing staff, consulting provider and ED provider.    VTE Prophylaxis - ON heparin drip.  Code Status - Full code.       YULI Jones  [facility]  09/01/20  06:19

## 2020-09-01 NOTE — ED NOTES
I wore full protective equipment throughout this patient encounter including a face mask, eye shield and gloves. Hand hygiene/washing of hands was performed before donning protective equipment and after removal when leaving the room.       Bhakti Cisneros RN  09/01/20 0703

## 2020-09-01 NOTE — ED NOTES
This RN wore goggles, mask, and gloves while in pts room. Pt wearing surgical mask.     Jared RN wearing PPE     Brittnee Hooker RN  09/01/20 6531

## 2020-09-01 NOTE — ED PROVIDER NOTES
EMERGENCY DEPARTMENT ENCOUNTER    Room Number:    Date of encounter:  2020  PCP: Provider, No Known  Historian: patient   Full history not obtainable due to: none     HPI:  Chief Complaint: Left flank pain     Context: Narcisa Hammer is a 60 y.o. female who presents to the ED c/o left flank pain onset Saturday, 2 days prior. Pain has been constant , sharp and radiating to LUQ. Nothing worsens or improves the pain. Associated dysuria 10 days prior for which she called her doctor and was Rx Keflex. No UA was performed. She denies any additional symptoms including no n/v/d. No fever.         PAST MEDICAL HISTORY    Active Ambulatory Problems     Diagnosis Date Noted   • No Active Ambulatory Problems     Resolved Ambulatory Problems     Diagnosis Date Noted   • No Resolved Ambulatory Problems     Past Medical History:   Diagnosis Date   • Diabetes mellitus (CMS/[facility])    • UTI (urinary tract infection)          PAST SURGICAL HISTORY  History reviewed. No pertinent surgical history.      FAMILY HISTORY  History reviewed. No pertinent family history.      SOCIAL HISTORY  Social History     Socioeconomic History   • Marital status: Single     Spouse name: Not on file   • Number of children: Not on file   • Years of education: Not on file   • Highest education level: Not on file   Tobacco Use   • Smoking status: Former Smoker     Types: Cigarettes     Last attempt to quit: 2000     Years since quittin.6   Substance and Sexual Activity   • Alcohol use: Yes         ALLERGIES  Doxycycline and Penicillins        REVIEW OF SYSTEMS  Review of Systems   All systems reviewed and marked as negative except as listed in HPI       PHYSICAL EXAM    I have reviewed the triage vital signs and nursing notes.    ED Triage Vitals   Temp Heart Rate Resp BP SpO2   20 2132 20 2132 20 2132 20 2157 20   97.7 °F (36.5 °C) 91 16 155/99 95 %      Temp src Heart Rate Source Patient Position BP Location  FiO2 (%)   08/31/20 2132 08/31/20 2132 08/31/20 2157 08/31/20 2157 --   Tympanic Monitor Standing Left arm        GENERAL: alert well developed, well nourished in mild distress secondary to pain, clutching left side of abdomen on my arrival to room  HENT: NCAT, neck supple, trachea midline  EYES: no scleral icterus, PERRL, normal conjunctiva  CV: regular rhythm, regular rate, no murmur  RESPIRATORY: unlabored effort, CTAB  ABDOMEN: soft, LUQ tenderness without rebound; left cva tenderness, non-distended, bowel sounds present  MUSCULOSKELETAL: no gross deformity  NEURO: alert,  sensory and motor function of extremities grossly intact, speech clear, mental status normal/baseline  SKIN: warm, dry, no rash  PSYCH:  Appropriate mood and affect    Vital signs and nursing notes reviewed.          LAB RESULTS  Recent Results (from the past 24 hour(s))   Comprehensive Metabolic Panel    Collection Time: 08/31/20 10:27 PM   Result Value Ref Range    Glucose 267 (H) 65 - 99 mg/dL    BUN 7 (L) 8 - 23 mg/dL    Creatinine 0.66 0.57 - 1.00 mg/dL    Sodium 134 (L) 136 - 145 mmol/L    Potassium 3.7 3.5 - 5.2 mmol/L    Chloride 97 (L) 98 - 107 mmol/L    CO2 25.6 22.0 - 29.0 mmol/L    Calcium 10.0 8.6 - 10.5 mg/dL    Total Protein 7.3 6.0 - 8.5 g/dL    Albumin 4.30 3.50 - 5.20 g/dL    ALT (SGPT) 28 1 - 33 U/L    AST (SGOT) 17 1 - 32 U/L    Alkaline Phosphatase 130 (H) 39 - 117 U/L    Total Bilirubin 0.4 0.0 - 1.2 mg/dL    eGFR Non African Amer 91 >60 mL/min/1.73    eGFR  African Amer 110 >60 mL/min/1.73    Globulin 3.0 gm/dL    A/G Ratio 1.4 g/dL    BUN/Creatinine Ratio 10.6 7.0 - 25.0    Anion Gap 11.4 5.0 - 15.0 mmol/L   Lipase    Collection Time: 08/31/20 10:27 PM   Result Value Ref Range    Lipase 11 (L) 13 - 60 U/L   Light Blue Top    Collection Time: 08/31/20 10:27 PM   Result Value Ref Range    Extra Tube hold for add-on    Green Top (Gel)    Collection Time: 08/31/20 10:27 PM   Result Value Ref Range    Extra Tube Hold for  add-ons.    Lavender Top    Collection Time: 08/31/20 10:27 PM   Result Value Ref Range    Extra Tube hold for add-on    Gold Top - SST    Collection Time: 08/31/20 10:27 PM   Result Value Ref Range    Extra Tube Hold for add-ons.    CBC Auto Differential    Collection Time: 08/31/20 10:27 PM   Result Value Ref Range    WBC 8.35 3.40 - 10.80 10*3/mm3    RBC 4.68 3.77 - 5.28 10*6/mm3    Hemoglobin 14.2 12.0 - 15.9 g/dL    Hematocrit 40.9 34.0 - 46.6 %    MCV 87.4 79.0 - 97.0 fL    MCH 30.3 26.6 - 33.0 pg    MCHC 34.7 31.5 - 35.7 g/dL    RDW 12.2 (L) 12.3 - 15.4 %    RDW-SD 38.7 37.0 - 54.0 fl    MPV 9.7 6.0 - 12.0 fL    Platelets 202 140 - 450 10*3/mm3    Neutrophil % 65.4 42.7 - 76.0 %    Lymphocyte % 25.0 19.6 - 45.3 %    Monocyte % 6.8 5.0 - 12.0 %    Eosinophil % 1.8 0.3 - 6.2 %    Basophil % 0.4 0.0 - 1.5 %    Immature Grans % 0.6 (H) 0.0 - 0.5 %    Neutrophils, Absolute 5.46 1.70 - 7.00 10*3/mm3    Lymphocytes, Absolute 2.09 0.70 - 3.10 10*3/mm3    Monocytes, Absolute 0.57 0.10 - 0.90 10*3/mm3    Eosinophils, Absolute 0.15 0.00 - 0.40 10*3/mm3    Basophils, Absolute 0.03 0.00 - 0.20 10*3/mm3    Immature Grans, Absolute 0.05 0.00 - 0.05 10*3/mm3    nRBC 0.0 0.0 - 0.2 /100 WBC   Urinalysis With Microscopic If Indicated (No Culture) - Urine, Clean Catch    Collection Time: 08/31/20 10:35 PM   Result Value Ref Range    Color, UA Yellow Yellow, Straw    Appearance, UA Clear Clear    pH, UA 5.5 5.0 - 8.0    Specific Gravity, UA 1.010 1.005 - 1.030    Glucose, UA Negative Negative    Ketones, UA Negative Negative    Bilirubin, UA Negative Negative    Blood, UA Negative Negative    Protein, UA Negative Negative    Leuk Esterase, UA Trace (A) Negative    Nitrite, UA Negative Negative    Urobilinogen, UA 0.2 E.U./dL 0.2 - 1.0 E.U./dL   Urinalysis, Microscopic Only - Urine, Clean Catch    Collection Time: 08/31/20 10:35 PM   Result Value Ref Range    RBC, UA 0-2 None Seen, 0-2 /HPF    WBC, UA 0-2 None Seen, 0-2 /HPF     Bacteria, UA None Seen None Seen /HPF    Squamous Epithelial Cells, UA 0-2 None Seen, 0-2 /HPF    Hyaline Casts, UA 0-2 None Seen /LPF    Methodology Automated Microscopy        Ordered the above labs and independently reviewed the results.        RADIOLOGY  See preliminary report by Dr Polo ct ab pel as below       I ordered the above noted radiological studies. Independently reviewed by me and discussed with radiologist.  See dictation above for official radiology interpretation.      PROCEDURES    Procedures        MEDICATIONS GIVEN IN ER    Medications   sodium chloride 0.9 % flush 10 mL (has no administration in time range)   morphine injection 4 mg (0 mg Intravenous Hold 8/31/20 2335)   sodium chloride 0.9 % bolus 500 mL (has no administration in time range)   ondansetron (ZOFRAN) injection 4 mg (4 mg Intravenous Given 8/31/20 2334)   acetaminophen (TYLENOL) tablet 1,000 mg (1,000 mg Oral Given 9/1/20 0031)   iopamidol (ISOVUE-300) 61 % injection 100 mL (100 mL Intravenous Given by Other 9/1/20 0009)         PROGRESS, DATA ANALYSIS, CONSULTS, AND MEDICAL DECISION MAKING    All labs have been independently reviewed by me.  All radiology studies have been reviewed by me.   EKG's independently reviewed by me.  Discussion below represents my analysis of pertinent findings related to patient's condition, differential diagnosis, treatment plan and final disposition.    DIFFERENTIAL DIAGNOSIS INCLUDE BUT NOT LIMITED TO: Gastroenteritis, flatus, appendicitis, pancreatitis, renal colic, nephrolithiasis, renal infarct, splenic infarct, mesenteric ischemia, perforated bowel, SBO, diverticulitis, colitis, abdominal wall pain, muscle spasm, IBS, biliary colic, cholecystitis      ED Course as of Sep 01 0132   Tue Sep 01, 2020   0000 I viewed ct ab pel on pacs. My findings are no free air.     [JS]   0046 Discussed pt with Dr Polo who reports the pt has findings of a splenic infarction on ct ab pel.     [JS]   0103  Leukocytes, UA(!): Trace [JS]   0109 I discussed patient with Monica Rdz MD for general surgery.  She agrees to consult on the patient but prefers to admit the patient to medicine.  [facility] consulted    [JS]   0129 Discussed pt with Renée ROLDAN who agrees to admit the pt to the hospital for Dr Arellano. Requests telelmetry    [JS]   0131 WBC: 8.35 [JS]   0131 Hemoglobin: 14.2 [JS]      ED Course User Index  [JS] Niya Skinner APRN       AS OF 01:32 VITALS:        BP - 140/76  HR - 74  TEMP - 97.7 °F (36.5 °C) (Tympanic)  02 SATS - 96%          DIAGNOSIS  Final diagnoses:   Splenic infarction   Left upper quadrant pain         DISPOSITION  Admission     Pt masked in first look. I wore a surgical mask and protective eye wear throughout my encounters with the pt. I performed hand hygiene on entry into the pt room and upon exit.     Dictated utilizing Dragon dictation:  Much of this encounter note is an electronic transcription/translation of spoken language to printed text. The electronic translation of spoken language may permit erroneous, or at times, nonsensical words or phrases to be inadvertently transcribed; Although I have reviewed the note for such errors, some may still exist.       Niya Skinner APRN  09/01/20 0138

## 2020-09-01 NOTE — ED NOTES
Left flank pain onset since Saturday seen at [facility]. Taking keflex that she had home. Pt in mask and shield in triage RN in appropriate PPE      Lori Contreras RN  08/31/20 3838

## 2020-09-02 ENCOUNTER — APPOINTMENT (OUTPATIENT)
Dept: CT IMAGING | Facility: HOSPITAL | Age: 60
End: 2020-09-02

## 2020-09-02 PROBLEM — E78.5 HYPERLIPIDEMIA: Status: ACTIVE | Noted: 2020-09-02

## 2020-09-02 LAB
ANA SER QL: NEGATIVE
ANION GAP SERPL CALCULATED.3IONS-SCNC: 8.1 MMOL/L (ref 5–15)
APTT PPP: 71.5 SECONDS (ref 22.7–35.4)
BASOPHILS # BLD AUTO: 0.02 10*3/MM3 (ref 0–0.2)
BASOPHILS NFR BLD AUTO: 0.3 % (ref 0–1.5)
BILIRUB UR QL STRIP: NEGATIVE
BUN SERPL-MCNC: 5 MG/DL (ref 8–23)
BUN/CREAT SERPL: 9.8 (ref 7–25)
CALCIUM SPEC-SCNC: 8.6 MG/DL (ref 8.6–10.5)
CHLORIDE SERPL-SCNC: 103 MMOL/L (ref 98–107)
CHOLEST SERPL-MCNC: 209 MG/DL (ref 0–200)
CLARITY UR: CLEAR
CO2 SERPL-SCNC: 25.9 MMOL/L (ref 22–29)
COLOR UR: YELLOW
CREAT SERPL-MCNC: 0.51 MG/DL (ref 0.57–1)
DEPRECATED RDW RBC AUTO: 38.7 FL (ref 37–54)
EOSINOPHIL # BLD AUTO: 0.1 10*3/MM3 (ref 0–0.4)
EOSINOPHIL NFR BLD AUTO: 1.5 % (ref 0.3–6.2)
ERYTHROCYTE [DISTWIDTH] IN BLOOD BY AUTOMATED COUNT: 12 % (ref 12.3–15.4)
GFR SERPL CREATININE-BSD FRML MDRD: 122 ML/MIN/1.73
GLUCOSE BLDC GLUCOMTR-MCNC: 233 MG/DL (ref 70–130)
GLUCOSE BLDC GLUCOMTR-MCNC: 275 MG/DL (ref 70–130)
GLUCOSE BLDC GLUCOMTR-MCNC: 278 MG/DL (ref 70–130)
GLUCOSE SERPL-MCNC: 281 MG/DL (ref 65–99)
GLUCOSE UR STRIP-MCNC: ABNORMAL MG/DL
HBA1C MFR BLD: 11.1 % (ref 4.8–5.6)
HCT VFR BLD AUTO: 34 % (ref 34–46.6)
HDLC SERPL-MCNC: 43 MG/DL (ref 40–60)
HGB BLD-MCNC: 11.6 G/DL (ref 12–15.9)
HGB UR QL STRIP.AUTO: NEGATIVE
IMM GRANULOCYTES # BLD AUTO: 0.07 10*3/MM3 (ref 0–0.05)
IMM GRANULOCYTES NFR BLD AUTO: 1 % (ref 0–0.5)
KETONES UR QL STRIP: ABNORMAL
LDLC SERPL CALC-MCNC: 127 MG/DL (ref 0–100)
LDLC/HDLC SERPL: 2.95 {RATIO}
LEUKOCYTE ESTERASE UR QL STRIP.AUTO: NEGATIVE
LYMPHOCYTES # BLD AUTO: 1.5 10*3/MM3 (ref 0.7–3.1)
LYMPHOCYTES NFR BLD AUTO: 22.5 % (ref 19.6–45.3)
MCH RBC QN AUTO: 30.1 PG (ref 26.6–33)
MCHC RBC AUTO-ENTMCNC: 34.1 G/DL (ref 31.5–35.7)
MCV RBC AUTO: 88.3 FL (ref 79–97)
MONOCYTES # BLD AUTO: 0.43 10*3/MM3 (ref 0.1–0.9)
MONOCYTES NFR BLD AUTO: 6.4 % (ref 5–12)
NEUTROPHILS NFR BLD AUTO: 4.55 10*3/MM3 (ref 1.7–7)
NEUTROPHILS NFR BLD AUTO: 68.3 % (ref 42.7–76)
NITRITE UR QL STRIP: NEGATIVE
NRBC BLD AUTO-RTO: 0 /100 WBC (ref 0–0.2)
PH UR STRIP.AUTO: 6.5 [PH] (ref 5–8)
PLATELET # BLD AUTO: 170 10*3/MM3 (ref 140–450)
PMV BLD AUTO: 9.6 FL (ref 6–12)
POTASSIUM SERPL-SCNC: 4 MMOL/L (ref 3.5–5.2)
PROT UR QL STRIP: NEGATIVE
RBC # BLD AUTO: 3.85 10*6/MM3 (ref 3.77–5.28)
SODIUM SERPL-SCNC: 137 MMOL/L (ref 136–145)
SP GR UR STRIP: 1.01 (ref 1–1.03)
TRIGL SERPL-MCNC: 196 MG/DL (ref 0–150)
UROBILINOGEN UR QL STRIP: ABNORMAL
VLDLC SERPL-MCNC: 39.2 MG/DL (ref 5–40)
WBC # BLD AUTO: 6.67 10*3/MM3 (ref 3.4–10.8)

## 2020-09-02 PROCEDURE — 83036 HEMOGLOBIN GLYCOSYLATED A1C: CPT | Performed by: NURSE PRACTITIONER

## 2020-09-02 PROCEDURE — 85025 COMPLETE CBC W/AUTO DIFF WBC: CPT | Performed by: NURSE PRACTITIONER

## 2020-09-02 PROCEDURE — 99232 SBSQ HOSP IP/OBS MODERATE 35: CPT | Performed by: STUDENT IN AN ORGANIZED HEALTH CARE EDUCATION/TRAINING PROGRAM

## 2020-09-02 PROCEDURE — 0 IOPAMIDOL PER 1 ML: Performed by: INTERNAL MEDICINE

## 2020-09-02 PROCEDURE — 25010000002 MORPHINE PER 10 MG: Performed by: INTERNAL MEDICINE

## 2020-09-02 PROCEDURE — 80061 LIPID PANEL: CPT | Performed by: INTERNAL MEDICINE

## 2020-09-02 PROCEDURE — 99221 1ST HOSP IP/OBS SF/LOW 40: CPT | Performed by: INTERNAL MEDICINE

## 2020-09-02 PROCEDURE — 25010000002 HEPARIN (PORCINE) PER 1000 UNITS: Performed by: NURSE PRACTITIONER

## 2020-09-02 PROCEDURE — 80048 BASIC METABOLIC PNL TOTAL CA: CPT | Performed by: SURGERY

## 2020-09-02 PROCEDURE — 74174 CTA ABD&PLVS W/CONTRAST: CPT

## 2020-09-02 PROCEDURE — 25010000002 HYDROMORPHONE PER 4 MG: Performed by: HOSPITALIST

## 2020-09-02 PROCEDURE — 71275 CT ANGIOGRAPHY CHEST: CPT

## 2020-09-02 PROCEDURE — 25010000002 ONDANSETRON PER 1 MG: Performed by: NURSE PRACTITIONER

## 2020-09-02 PROCEDURE — 81003 URINALYSIS AUTO W/O SCOPE: CPT | Performed by: INTERNAL MEDICINE

## 2020-09-02 PROCEDURE — 36415 COLL VENOUS BLD VENIPUNCTURE: CPT | Performed by: NURSE PRACTITIONER

## 2020-09-02 PROCEDURE — 25010000002 ONDANSETRON PER 1 MG: Performed by: INTERNAL MEDICINE

## 2020-09-02 PROCEDURE — 85730 THROMBOPLASTIN TIME PARTIAL: CPT | Performed by: NURSE PRACTITIONER

## 2020-09-02 PROCEDURE — 82962 GLUCOSE BLOOD TEST: CPT

## 2020-09-02 RX ORDER — LORAZEPAM 1 MG/1
1 TABLET ORAL EVERY 6 HOURS PRN
Status: DISCONTINUED | OUTPATIENT
Start: 2020-09-02 | End: 2020-09-05 | Stop reason: HOSPADM

## 2020-09-02 RX ORDER — MORPHINE SULFATE 2 MG/ML
4 INJECTION, SOLUTION INTRAMUSCULAR; INTRAVENOUS EVERY 4 HOURS PRN
Status: DISCONTINUED | OUTPATIENT
Start: 2020-09-02 | End: 2020-09-05 | Stop reason: HOSPADM

## 2020-09-02 RX ORDER — HYDROMORPHONE HYDROCHLORIDE 1 MG/ML
0.5 INJECTION, SOLUTION INTRAMUSCULAR; INTRAVENOUS; SUBCUTANEOUS
Status: DISCONTINUED | OUTPATIENT
Start: 2020-09-02 | End: 2020-09-02

## 2020-09-02 RX ADMIN — OXYCODONE HYDROCHLORIDE AND ACETAMINOPHEN 1 TABLET: 5; 325 TABLET ORAL at 20:53

## 2020-09-02 RX ADMIN — MORPHINE SULFATE 4 MG: 2 INJECTION, SOLUTION INTRAMUSCULAR; INTRAVENOUS at 14:27

## 2020-09-02 RX ADMIN — ONDANSETRON 4 MG: 2 INJECTION INTRAMUSCULAR; INTRAVENOUS at 06:13

## 2020-09-02 RX ADMIN — HEPARIN SODIUM 13.3 UNITS/KG/HR: 10000 INJECTION, SOLUTION INTRAVENOUS at 15:16

## 2020-09-02 RX ADMIN — OXYCODONE HYDROCHLORIDE AND ACETAMINOPHEN 1 TABLET: 5; 325 TABLET ORAL at 12:02

## 2020-09-02 RX ADMIN — MORPHINE SULFATE 2 MG: 2 INJECTION, SOLUTION INTRAMUSCULAR; INTRAVENOUS at 03:13

## 2020-09-02 RX ADMIN — MORPHINE SULFATE 4 MG: 2 INJECTION, SOLUTION INTRAMUSCULAR; INTRAVENOUS at 23:16

## 2020-09-02 RX ADMIN — ONDANSETRON 8 MG: 2 INJECTION INTRAMUSCULAR; INTRAVENOUS at 10:00

## 2020-09-02 RX ADMIN — PANTOPRAZOLE SODIUM 40 MG: 40 TABLET, DELAYED RELEASE ORAL at 06:13

## 2020-09-02 RX ADMIN — OXYCODONE HYDROCHLORIDE AND ACETAMINOPHEN 1 TABLET: 5; 325 TABLET ORAL at 16:45

## 2020-09-02 RX ADMIN — HYDROMORPHONE HYDROCHLORIDE 0.5 MG: 1 INJECTION, SOLUTION INTRAMUSCULAR; INTRAVENOUS; SUBCUTANEOUS at 06:12

## 2020-09-02 RX ADMIN — ONDANSETRON 8 MG: 2 INJECTION INTRAMUSCULAR; INTRAVENOUS at 16:45

## 2020-09-02 RX ADMIN — MORPHINE SULFATE 4 MG: 2 INJECTION, SOLUTION INTRAMUSCULAR; INTRAVENOUS at 18:45

## 2020-09-02 RX ADMIN — OXYCODONE HYDROCHLORIDE AND ACETAMINOPHEN 1 TABLET: 5; 325 TABLET ORAL at 01:48

## 2020-09-02 RX ADMIN — MORPHINE SULFATE 4 MG: 2 INJECTION, SOLUTION INTRAMUSCULAR; INTRAVENOUS at 10:17

## 2020-09-02 RX ADMIN — IOPAMIDOL 100 ML: 755 INJECTION, SOLUTION INTRAVENOUS at 11:24

## 2020-09-02 NOTE — PROGRESS NOTES
"GENERAL SURGERY PROGRESS NOTE    SUMMARY (A/P): Ms. Narcisa Hammer is a 60 y.o. year old lady with isolated splenic infarct.  No plans for surgical intervention at this time.  Hypercoagulable work-up pending, CTA today, echo normal yesterday, duplex negative of lower extremities.  On heparin drip. Will continue to follow.     Chief Complaint: Left flank pain    Interval Events: Continues to complain of left flank pain.  She states it is minimally relieved by pain medication.  Had vomiting overnight, which she thinks may be related to Dilaudid intake.  No bowel movement yesterday.  No new areas of pain.    Review of Systems: Positive for nausea and vomiting    O:/55 (BP Location: Left arm, Patient Position: Lying)   Pulse 51   Temp 97.4 °F (36.3 °C) (Oral)   Resp 16   Ht 172.7 cm (67.99\")   Wt 97.1 kg (214 lb)   SpO2 95%   BMI 32.55 kg/m²       GENERAL: alert, well appearing, and in no distress, uncomfortable  HEENT: normocephalic, atraumatic, moist mucous membranes, clear sclerae   CHEST: no increased work of breathing, no wheezes, symmetric air entry  CARDIAC: regular rate and rhythm    ABDOMEN: soft, minimally TTP in LUQ, no peritoneal signs   EXTREMITIES: no cyanosis, clubbing, or edema   SKIN: Warm and moist, no rashes    LABS  Results from last 7 days   Lab Units 09/02/20  0832 09/01/20  0236 08/31/20  2227   WBC 10*3/mm3 6.67 6.91 8.35   HEMOGLOBIN g/dL 11.6* 12.7 14.2   HEMATOCRIT % 34.0 37.7 40.9   PLATELETS 10*3/mm3 170 184 202     Results from last 7 days   Lab Units 09/02/20  0832 08/31/20  2227   SODIUM mmol/L 137 134*   POTASSIUM mmol/L 4.0 3.7   CHLORIDE mmol/L 103 97*   CO2 mmol/L 25.9 25.6   BUN mg/dL 5* 7*   CREATININE mg/dL 0.51* 0.66   CALCIUM mg/dL 8.6 10.0   BILIRUBIN mg/dL  --  0.4   ALK PHOS U/L  --  130*   ALT (SGPT) U/L  --  28   AST (SGOT) U/L  --  17   GLUCOSE mg/dL 281* 267*     Results from last 7 days   Lab Units 09/02/20  0832 09/01/20  2038 09/01/20  0907 09/01/20  0236 "   INR   --   --   --  0.98   APTT seconds 71.5* 68.5* 44.7* 26.2       JLUIS MATHIAS MD  General and Endoscopic Surgery  [facility]    [address], [address]  [place], [place], [address]  P: [phone]  F: [phone]

## 2020-09-02 NOTE — PROGRESS NOTES
Name: Narcisa Hammer ADMIT: 2020   : 1960  PCP: Provider, No Known    MRN: [id number] LOS: 1 days   AGE/SEX: 60 y.o. female  ROOM: E667/1     Subjective   Subjective   Patient reports increasing left upper abdominal pain radiating to the left lower back.  Worsening of the nausea and vomiting.  No hematemesis.  No bowel movement since admission.  Positive chills but no fever.  No heartburn and no dysphagia but decreased appetite.    Review of Systems  Cardiovascular/respiratory.  No chest pain/no shortness of breath/no cough/no hemoptysis  .  Continues with frequency and urgency but no hematuria or dysuria.  CNS.  No headache no focal neurological symptoms.     Objective   Objective   Vital Signs  Temp:  [97.4 °F (36.3 °C)-98.5 °F (36.9 °C)] 97.4 °F (36.3 °C)  Heart Rate:  [51-79] 51  Resp:  [14-18] 16  BP: (109-159)/(55-87) 109/55  SpO2:  [95 %-99 %] 95 %  on  Flow (L/min):  [1-2] 2;   Device (Oxygen Therapy): nasal cannula    Intake/Output Summary (Last 24 hours) at 2020 1003  Last data filed at 2020 0707  Gross per 24 hour   Intake 1620 ml   Output 1400 ml   Net 220 ml     Body mass index is 32.55 kg/m².      20  2306 20  1458 20  1814   Weight: 97.5 kg (215 lb) 97.3 kg (214 lb 9.6 oz) 97.1 kg (214 lb)     Physical Exam  General.  Middle-aged female alert oriented x3 in mild to moderate pain no respiratory distress  Oral cavity.  Moist mucous membrane no throat lesions or exudates  Neck.  Supple no JVD no lymphadenopathy no thyromegaly  Cardiovascular.  Regular rate and rhythm bradycardia grade 2 systolic murmur  Chest.  Clear to auscultation bilaterally with no added sounds  Abdomen.  Soft lax no tenderness no organomegaly no guarding or rebound  Extremities.  No clubbing cyanosis or edema  CNS.  No acute focal neurological deficits    Results Review:      Results from last 7 days   Lab Units 20  0832 20  2227   SODIUM mmol/L 137 134*   POTASSIUM mmol/L 4.0  3.7   CHLORIDE mmol/L 103 97*   CO2 mmol/L 25.9 25.6   BUN mg/dL 5* 7*   CREATININE mg/dL 0.51* 0.66   GLUCOSE mg/dL 281* 267*   CALCIUM mg/dL 8.6 10.0   AST (SGOT) U/L  --  17   ALT (SGPT) U/L  --  28     Estimated Creatinine Clearance: 139.4 mL/min (A) (by C-G formula based on SCr of 0.51 mg/dL (L)).  Results from last 7 days   Lab Units 09/02/20  0832   HEMOGLOBIN A1C % 11.10*     Results from last 7 days   Lab Units 09/02/20  0557 09/01/20  2108   GLUCOSE mg/dL 275* 250*                       Invalid input(s):  PHOS  Results from last 7 days   Lab Units 09/02/20  0832   CHOLESTEROL mg/dL 209*   TRIGLYCERIDES mg/dL 196*   HDL CHOL mg/dL 43     Results from last 7 days   Lab Units 09/02/20  0832 09/01/20  0236 08/31/20  2227   WBC 10*3/mm3 6.67 6.91 8.35   HEMOGLOBIN g/dL 11.6* 12.7 14.2   HEMATOCRIT % 34.0 37.7 40.9   PLATELETS 10*3/mm3 170 184 202   MCV fL 88.3 89.1 87.4   MCH pg 30.1 30.0 30.3   MCHC g/dL 34.1 33.7 34.7   RDW % 12.0* 12.6 12.2*   RDW-SD fl 38.7 41.1 38.7   MPV fL 9.6 9.4 9.7   NEUTROPHIL % % 68.3 67.0 65.4   LYMPHOCYTE % % 22.5 24.2 25.0   MONOCYTES % % 6.4 6.5 6.8   EOSINOPHIL % % 1.5 1.6 1.8   BASOPHIL % % 0.3 0.3 0.4   IMM GRAN % % 1.0* 0.4 0.6*   NEUTROS ABS 10*3/mm3 4.55 4.63 5.46   LYMPHS ABS 10*3/mm3 1.50 1.67 2.09   MONOS ABS 10*3/mm3 0.43 0.45 0.57   EOS ABS 10*3/mm3 0.10 0.11 0.15   BASOS ABS 10*3/mm3 0.02 0.02 0.03   IMMATURE GRANS (ABS) 10*3/mm3 0.07* 0.03 0.05   NRBC /100 WBC 0.0 0.0 0.0     Results from last 7 days   Lab Units 09/02/20  0832 09/01/20 2038 09/01/20  0907 09/01/20  0236   INR   --   --   --  0.98   APTT seconds 71.5* 68.5* 44.7* 26.2                 Results from last 7 days   Lab Units 08/31/20  2227   LIPASE U/L 11*             Results from last 7 days   Lab Units 09/02/20  0009 08/31/20  2235   NITRITE UA  Negative Negative   WBC UA /HPF  --  0-2   BACTERIA UA /HPF  --  None Seen   SQUAM EPITHEL UA /HPF  --  0-2           Imaging:  Imaging Results (Last 24 Hours)      Procedure Component Value Units Date/Time    CT Abdomen Pelvis With Contrast [[id number]] Collected:  09/01/20 0701     Updated:  09/01/20 2113    Narrative:       CT ABDOMEN PELVIS WITH IV CONTRAST     HISTORY: Left flank pain with onset Saturday. Left upper quadrant pain.     TECHNIQUE: CT abdomen and pelvis with intravenous and without oral  contrast.     COMPARISON: None.     FINDINGS: Lung bases appear clear and there is no basilar or pleural  effusion. There is diffuse fat infiltration of the liver. Gallbladder,  adrenal glands, pancreas, kidneys appear within normal limits. There is  no hydronephrosis.     Within the central to lateral aspect of the spleen, there is an area of  linear hypodensity with subtle volume loss at its periphery and this is  consistent with splenic infarction. There is no perisplenic fluid or  hemorrhage to suggest that this represents a laceration.     There is no bowel dilatation or evidence for bowel obstruction. Normal  appendix is visualized. Urinary bladder is mostly decompressed.       Impression:       1. Linear low-density within the spleen is consistent with a splenic  infarction. No perisplenic fluid or other findings to suggest  laceration.  2. Diffuse fat infiltration of the liver.     Findings discussed with YULI Wyatt in the emergency room on  09/01/2020 at 12:26 AM.     Radiation dose reduction techniques were utilized, including automated  exposure control and exposure modulation based on body size.     This report was finalized on 9/1/2020 9:10 PM by Dr. Santiago Vega M.D.                I reviewed the patient's new clinical results / labs / tests / procedures      Assessment/Plan     Active Hospital Problems    Diagnosis  POA   • **Splenic infarction [D73.5]  Yes   • Type 2 diabetes mellitus, without long-term current use of insulin (CMS/[facility]) [E11.9]  Yes   • PAD (peripheral artery disease) (CMS/[facility]) [I73.9]  Yes   • CHRISTIANA (obstructive sleep apnea)  [G47.33]  Yes      Resolved Hospital Problems   No resolved problems to display.       1.  Splenic infarction leading to upper abdominal pain.  Negative lower extremity venous Doppler ultrasound.  No evidence of PE.  No history of hypercoagulable status or blood clots before.  Awaiting hypercoagulable status work-up.    2D echo of the heart without thrombus or vegetation and normal ejection fraction.  Will consult cardiology for SHERYL consideration.  Awaiting hematology/vascular surgery consultation. General surgery consultation is appreciated.  Will continue the patient on heparin/Protonix and aspirin.  EKG negative for A. fib..   Patient with increasing abdominal pain and nausea and vomiting.  Will increase Zofran and morphine.  Will keep on liquid diet for now.  2.  Type 2 diabetes.  Poor control.  A1c is 11.1.   Continue sliding scale insulin for now until the p.o. intake is established will need long-acting insulin at the time of discharge.  3.  Obstructive sleep apnea.   on CPAP at night.  4.  Frequency and urgency of urination with decreased urine output.    Negative UA.  Negative bladder scan.  Mostly overactive bladder.  Trial of Detrol at discharge.  5.  Dyslipidemia.  Statin at discharge.  6.  VTE prophylaxis.  Patient will be anticoagulated.  Discussed my findings and recommendation with the patient/son/nurse.          Michelle Gonzales MD  [facility]  09/02/20  10:03

## 2020-09-02 NOTE — CONSULTS
[facility]  Cardiology Consult Note    Patient Identification:  Name: Narcisa aHmmer  Age: 60 y.o.  Sex: female  :  1960  MRN: [id number]             Requesting Physician: Dr. Steele    Reason for Consultation / Chief Complaint: management recommendations cardiac source of thrombus    History of Present Illness:   60-year-old female with a history of sleep apnea, diabetes, peripheral vascular disease, admitted with splenic infarct with sudden onset abdominal pain has been consulted to rule out cardiac source    Patient has a history of angina pectoris several years ago    Comorbid cardiac risk factors:     Past Medical History:  Past Medical History:   Diagnosis Date   • Diabetes mellitus (CMS/HCC)    • UTI (urinary tract infection)      Past Surgical History:  History reviewed. No pertinent surgical history.   Allergies:  Allergies   Allergen Reactions   • Dilaudid [Hydromorphone Hcl] Nausea And Vomiting   • Doxycycline Hives   • Penicillins Unknown - Low Severity   • Sulfa Antibiotics Hives     Home Meds:  Medications Prior to Admission   Medication Sig Dispense Refill Last Dose   • cephalexin (KEFLEX) 250 MG capsule Take 250 mg by mouth 4 (Four) Times a Day.   2020 at Unknown time   • Multiple Vitamins-Minerals (MULTIVITAMIN ADULT PO) Take 1 tablet by mouth.   2020 at Unknown time   • nitrofurantoin, macrocrystal-monohydrate, (MACROBID) 100 MG capsule Take 100 mg by mouth 2 (Two) Times a Day.   2020 at Unknown time   • NON FORMULARY Take 1 tablet by mouth 3 (Three) Times a Day. Melavic supplement for DM   2020 at Unknown time   • phenazopyridine (Pyridium) 100 MG tablet Take 100 mg by mouth 3 (Three) Times a Day As Needed for Bladder Spasms.   2020 at Unknown time     Current Meds:   [unfilled]  Social History:   Social History     Tobacco Use   • Smoking status: Former Smoker     Types: Cigarettes     Last attempt to quit: 2000     Years since quittin.6      Substance Use Topics   • Alcohol use: Yes      Family History:  History reviewed. No pertinent family history.     Review of Systems    Constitutional: No weakness,fatigue, fever, rigors, chills   Eyes: No vision changes, eye pain   ENT/oropharynx: No difficulty swallowing, sore throat, epistaxis, changes in hearing   Cardiovascular: No chest pain, chest tightness, palpitations, paroxysmal nocturnal dyspnea, orthopnea, diaphoresis, dizziness / syncopal episode   Respiratory: No shortness of breath, dyspnea on exertion, cough, wheezing hemoptysis   Gastrointestinal: No abdominal pain, nausea, vomiting, diarrhea, bloody stools   Genitourinary: No hematuria, dysuria   Neurological: No headache, tremors, numbness,  one-sided weakness    Musculoskeletal: No cramps, myalgias,  joint pain, joint swelling   Integument: No rash, edema           Constitutional:  Temp:  [97.4 °F (36.3 °C)-98.5 °F (36.9 °C)] 97.4 °F (36.3 °C)  Heart Rate:  [51-74] 51  Resp:  [16-18] 16  BP: (109-159)/(55-85) 109/55    Physical Exam   General:  Appears in no acute distress  Eyes: PERTL,  HEENT:  No JVD. Thyroid not visibly enlarged. No mucosal pallor or cyanosis  Respiratory: Respirations regular and unlabored at rest. BBS with good air entry in all fields. No crackles, rubs or wheezes auscultated  Cardiovascular: S1S2 Regular rate and rhythm. No murmur, rub or gallop auscultated. No carotid bruits. DP/PT pulses    . No pretibial pitting edema  Gastrointestinal: Abdomen soft, flat, non tender. Bowel sounds present. No hepatosplenomegaly. No ascites  Musculoskeletal: PATTERSON x4. No abnormal movements  Extremities: No digital clubbing or cyanosis  Skin: Color pink. Skin warm and dry to touch. No rashes  No xanthoma  Neuro: AAO x3 CN II-XII grossly intact              Cardiographics  ECG:         Telemetry:    Echocardiogram:     Imaging  Chest X-ray:     Lab Review           Results from last 7 days   Lab Units 09/02/20  0832   SODIUM mmol/L 137    POTASSIUM mmol/L 4.0   BUN mg/dL 5*   CREATININE mg/dL 0.51*   CALCIUM mg/dL 8.6     @[id number]@  Results from last 7 days   Lab Units 09/02/20  0832 09/01/20  0236 08/31/20  2227   WBC 10*3/mm3 6.67 6.91 8.35   HEMOGLOBIN g/dL 11.6* 12.7 14.2   HEMATOCRIT % 34.0 37.7 40.9   PLATELETS 10*3/mm3 170 184 202     Results from last 7 days   Lab Units 09/02/20  0832 09/01/20 2038 09/01/20  0907 09/01/20  0236   INR   --   --   --  0.98   APTT seconds 71.5* 68.5* 44.7* 26.2     Interpretation Summary     · Left ventricular systolic function is normal. Calculated EF = 68%  · Left ventricular wall thickness is consistent with mild concentric hypertrophy.  · Left ventricular diastolic function is normal  · Normal right ventricular cavity size and systolic function noted.  · Saline test results are negative.  · There is no evidence of pericardial effusion.         Assessment:  Splenic infarct etiology questionable  History of angina in the past    Recommendations / Plan:     60-year-old female admitted with a splenic infarct, will need cardiac source eliminated, transthoracic echocardiogram appears benign, patient will need transesophageal echocardiogram as well as ZIO Patch to rule out cardiac thrombus    We will repeat EKGs and troponin    Ischemic work-up will be considered when patient is stable        Sonali Ngo MD  9/2/2020, 12:18      EMR Dragon/Transcription:   Dictated utilizing Dragon dictation

## 2020-09-02 NOTE — CONSULTS
Name: Narcisa Hammer ADMIT: 2020   : 1960  PCP: Piero, No Known    MRN: [id number] LOS: 1 days   AGE/SEX: 60 y.o. female  ROOM: E667/1   [facility]      Patient Care Team:  Provider, No Known as PCP - General  Chief Complaint   Patient presents with   • Flank Pain     CC: Splenic infarct evaluation.    Subjective     Inpatient Vascular Surgery Consult  Consult performed by: Nelson Villeda MD  Consult ordered by: Michelle Gonzales MD  Reason for consult: Splenic infarction evaluation.        History generated from review of chart and verification of findings at bedside with patient.    A pleasant 60 years old white female with a past history of obstructive sleep apnea/type 2 diabetes/recurrent UTI/lower extremity peripheral vascular disease.  Patient has no past history of PE/DVT/CVA or hypercoagulable status.  She presents to the emergency department after initially being treated for UTI because of mid and low back pain and frequency and urgency of urination with decreased urine output.  3 days ago however she started to have severe upper abdominal pain that is progressive radiating to the left lower chest and loin.  Increased with inspiration and movement.  There was no bleeding diathesis.  Positive nausea but no vomiting no heartburn no dysphagia no odynophagia her last bowel movement was 2 days ago without any diarrhea constipation bleeding per rectum or melena.  She does describes poor urine output and dysuria with frequency and urgency and no hematuria.  There was positive history of chills but no fever.  Other review of system was negative for chest pain/shortness of breath/cough/wheeze/hemoptysis/ankle edema/palpitation.  She does describe headache but no focal neurological symptoms.    Patient remains with persistent nausea while in the hospital and persistent left upper quadrant abdominal pain.    Review of Systems   All other systems reviewed and are negative.    Except positive stated  in the HPI segment.  Past Medical History:   Diagnosis Date   • Diabetes mellitus (CMS/HCC)    • UTI (urinary tract infection)      History reviewed. No pertinent surgical history.  History reviewed. No pertinent family history.  Social History     Tobacco Use   • Smoking status: Former Smoker     Types: Cigarettes     Last attempt to quit: 2000     Years since quittin.6   Substance Use Topics   • Alcohol use: Yes   • Drug use: Not on file     Medications Prior to Admission   Medication Sig Dispense Refill Last Dose   • cephalexin (KEFLEX) 250 MG capsule Take 250 mg by mouth 4 (Four) Times a Day.   2020 at Unknown time   • Multiple Vitamins-Minerals (MULTIVITAMIN ADULT PO) Take 1 tablet by mouth.   2020 at Unknown time   • nitrofurantoin, macrocrystal-monohydrate, (MACROBID) 100 MG capsule Take 100 mg by mouth 2 (Two) Times a Day.   2020 at Unknown time   • NON FORMULARY Take 1 tablet by mouth 3 (Three) Times a Day. Melavic supplement for DM   2020 at Unknown time   • phenazopyridine (Pyridium) 100 MG tablet Take 100 mg by mouth 3 (Three) Times a Day As Needed for Bladder Spasms.   2020 at Unknown time       aspirin 81 mg Oral Daily   insulin lispro 0-7 Units Subcutaneous TID AC   multivitamin with minerals 1 tablet Oral Daily   pantoprazole 40 mg Oral Q AM   sodium chloride 10 mL Intravenous Q12H       heparin (porcine) 10.3 Units/kg/hr Last Rate: 13.3 Units/kg/hr (20)   sodium chloride 100 mL/hr Last Rate: 100 mL/hr (20 0436)     •  acetaminophen **OR** acetaminophen **OR** acetaminophen  •  dextrose  •  dextrose  •  glucagon (human recombinant)  •  HYDROmorphone  •  [DISCONTINUED] Morphine **AND** naloxone  •  nitroglycerin  •  ondansetron  •  oxyCODONE-acetaminophen  •  sodium chloride  •  sodium chloride  Doxycycline; Penicillins; and Sulfa antibiotics    Objective     Physical Exam:  Physical Exam   Constitutional: She is oriented to person, place, and time. She  "appears well-developed and well-nourished.   HENT:   Head: Normocephalic and atraumatic.   Eyes: Pupils are equal, round, and reactive to light. EOM are normal.   Neck: Normal range of motion. Neck supple.   Cardiovascular: Normal rate and regular rhythm.   Pulmonary/Chest: Effort normal and breath sounds normal.   Abdominal: Soft. Bowel sounds are normal.   Left upper quadrant abdominal pain.   Musculoskeletal: Normal range of motion. She exhibits no edema.   Neurological: She is alert and oriented to person, place, and time.   Skin: Skin is warm and dry. Capillary refill takes less than 2 seconds.   Psychiatric: She has a normal mood and affect. Her behavior is normal.   Vitals reviewed.      Vital Signs and Labs:  Vital Signs Patient Vitals for the past 24 hrs:   BP Temp Temp src Pulse Resp SpO2 Height Weight   09/02/20 0707 109/55 97.4 °F (36.3 °C) Oral 51 16 -- -- --   09/02/20 0325 134/65 97.8 °F (36.6 °C) Oral 54 16 95 % -- --   09/01/20 2320 -- 98.4 °F (36.9 °C) Oral -- -- -- -- --   09/01/20 2251 157/83 -- -- 74 18 97 % -- --   09/01/20 1937 159/80 98.4 °F (36.9 °C) Oral 58 18 96 % -- --   09/01/20 1814 -- -- -- -- -- -- 172.7 cm (67.99\") 97.1 kg (214 lb)   09/01/20 1458 144/80 98.5 °F (36.9 °C) Oral 58 18 97 % -- 97.3 kg (214 lb 9.6 oz)   09/01/20 1232 138/85 -- -- 59 17 96 % -- --   09/01/20 1122 -- -- -- 79 -- 97 % -- --   09/01/20 1117 152/87 -- -- 75 14 99 % -- --   09/01/20 1114 -- -- -- -- -- 97 % -- --   09/01/20 0810 132/82 97.7 °F (36.5 °C) Oral 68 16 98 % -- --     I/O:  I/O last 3 completed shifts:  In: 2120 [P.O.:570; I.V.:1050; IV Piggyback:500]  Out: 750 [Urine:750]    CBC    Results from last 7 days   Lab Units 09/01/20  0236 08/31/20  2227   WBC 10*3/mm3 6.91 8.35   HEMOGLOBIN g/dL 12.7 14.2   PLATELETS 10*3/mm3 184 202     BMP   Results from last 7 days   Lab Units 08/31/20  2227   SODIUM mmol/L 134*   POTASSIUM mmol/L 3.7   CHLORIDE mmol/L 97*   CO2 mmol/L 25.6   BUN mg/dL 7*   CREATININE " mg/dL 0.66   GLUCOSE mg/dL 267*     Cr Clearance Estimated Creatinine Clearance: 107.7 mL/min (by C-G formula based on SCr of 0.66 mg/dL).  Coag   Results from last 7 days   Lab Units 09/01/20 2038 09/01/20  0907 09/01/20  0236   INR   --   --  0.98   APTT seconds 68.5* 44.7* 26.2     HbA1C No results found for: HGBA1C  Blood Glucose   Glucose   Date/Time Value Ref Range Status   09/02/2020 0557 275 (H) 70 - 130 mg/dL Final   09/01/2020 2108 250 (H) 70 - 130 mg/dL Final     Infection     CMP   Results from last 7 days   Lab Units 08/31/20  2227   SODIUM mmol/L 134*   POTASSIUM mmol/L 3.7   CHLORIDE mmol/L 97*   CO2 mmol/L 25.6   BUN mg/dL 7*   CREATININE mg/dL 0.66   GLUCOSE mg/dL 267*   ALBUMIN g/dL 4.30   BILIRUBIN mg/dL 0.4   ALK PHOS U/L 130*   AST (SGOT) U/L 17   ALT (SGPT) U/L 28   LIPASE U/L 11*     ABG      UA    Results from last 7 days   Lab Units 09/02/20  0009 08/31/20  2235   NITRITE UA  Negative Negative   WBC UA /HPF  --  0-2   BACTERIA UA /HPF  --  None Seen   SQUAM EPITHEL UA /HPF  --  0-2     MARGOTH  No results found for: POCMETH, POCAMPHET, POCBARBITUR, POCBENZO, POCCOCAINE, POCOPIATES, POCOXYCODO, POCPHENCYC, POCPROPOXY, POCTHC, POCTRICYC  Radiology(recent) Ct Abdomen Pelvis With Contrast    Result Date: 9/1/2020  1. Linear low-density within the spleen is consistent with a splenic infarction. No perisplenic fluid or other findings to suggest laceration. 2. Diffuse fat infiltration of the liver.  Findings discussed with YULI Wyatt in the emergency room on 09/01/2020 at 12:26 AM.  Radiation dose reduction techniques were utilized, including automated exposure control and exposure modulation based on body size.  This report was finalized on 9/1/2020 9:10 PM by Dr. Santiago Vega M.D.        Active Hospital Problems    Diagnosis  POA   • **Splenic infarction [D73.5]  Yes   • Type 2 diabetes mellitus, without long-term current use of insulin (CMS/HCC) [E11.9]  Yes   • PAD (peripheral artery  disease) (CMS/[facility]) [I73.9]  Yes   • CHRISTIANA (obstructive sleep apnea) [G47.33]  Yes      Resolved Hospital Problems   No resolved problems to display.     Problem Points:  4:  Patient has a new problem, with additional work-up planned  Total problem points:4 or more    Data Points:  1:  I have reviewed or order clinical lab test  1:  I have reviewed or order radiology test (except heart catheterization or echo)  2:  I have personally and independently review of image, tracing, or specimen  Total data points:4 or more  Personally reviewed CT scan and agree with a small wedge infarct of the spleen with no complicating features.    Risk Points:  Moderate: New diagnosis with unknown prognosis    MDM requires 2/3 (Problem points, Data points and Risk)  MDM Prob point Data point Risk   SF 1 1 Minimal   Low 2 2 Low   Mod 3 3 Moderate   High 4 4 High     Code requires 3/3 (MDM, History and Exam)  Code MDM History Exam Time   26924 SF/Low Detailed Detailed 30   22440 Mod Comprehensive Comprehensive 50   18248 High Comprehensive Comprehensive 70     Detailed history:  4 elements HPI or status of 3 chronic problems; 2-9 system ROS  Comprehensive:  4 elements HPI or status of 3 chronic problems;  10 system ROS    Detailed Exam:  12 findings from any organ system  Comprehensive Exam:  2 findings from each of 9 systems.   50303    Assessment/Plan       Splenic infarction    Type 2 diabetes mellitus, without long-term current use of insulin (CMS/[facility])    PAD (peripheral artery disease) (CMS/[facility])    CHRISITANA (obstructive sleep apnea)    Interpretation Summary     · Left ventricular systolic function is normal. Calculated EF = 68%  · Left ventricular wall thickness is consistent with mild concentric hypertrophy.  · Left ventricular diastolic function is normal  · Normal right ventricular cavity size and systolic function noted.  · Saline test results are negative.  · There is no evidence of pericardial effusion.          60 y.o. female  patient with what appears to be an isolated splenic infarct.  This is causing significant discomfort being managed by the primary service.  I did discuss with general surgery has no plans for intervention at this time.  Her two-dimensional echocardiogram done this admission does not show any cardiac source of thrombus.  We will go ahead and check a CT angiogram of the chest abdomen and pelvis to ensure no aortic or proximal arterial source of atheroemboli.  Hematology consult pending.    Personal protective equipment used for this patient encounter:  Patient wearing surgical mask [x]    Provider wearing a surgical mask [x]    Gloves [x]    Eye protection []    Face Shield []    Gown []    N 95 respirator or CAPR/PAPR []   Duration of interaction 10 minutes.    I discussed the patients findings and my recommendations with patient and consulting provider.    Nelson Villeda MD  09/02/20  08:04    Please call my office with any question: [phone]

## 2020-09-02 NOTE — PLAN OF CARE
Problem: Patient Care Overview  Goal: Plan of Care Review  Outcome: Ongoing (interventions implemented as appropriate)  Flowsheets (Taken 9/2/2020 0442)  Progress: no change  Plan of Care Reviewed With: patient  Outcome Summary: Patient is alert and oriented. VSS. On heparin gtt for splenic infarct. pTT was therapeutic at beginning of shift, will recheck in AM. IV fluids continued. Urine sent for UA- no bacteria/leukocytes noted. Complaining of LUQ,LLQ, and L flank pain overnight unrelieved by alternating morphine/percocet. Notified MD. Morphine discontinued and dilaudid ordered. Although patient complaining of pain, was able to sleep for about 2 hour increments overnight. PVRs showed 0ml of urine retained in bladder. Will continue to monitor closely.     Problem: Skin Injury Risk (Adult)  Goal: Skin Health and Integrity  Outcome: Ongoing (interventions implemented as appropriate)  Flowsheets (Taken 9/2/2020 0442)  Skin Health and Integrity: making progress toward outcome

## 2020-09-02 NOTE — PROGRESS NOTES
Discharge Planning Assessment  [facility]     Patient Name: Narcisa Hammer  MRN: [id number]  Today's Date: 9/2/2020    Admit Date: 8/31/2020    Discharge Needs Assessment     Row Name 09/02/20 1408       Living Environment    Lives With  alone    Unique Family Situation  currtently staying with son in [place]     Current Living Arrangements  home/apartment/condo    Primary Care Provided by  self    Provides Primary Care For  no one    Family Caregiver if Needed  child(neptali), adult    Quality of Family Relationships  involved;helpful    Able to Return to Prior Arrangements  yes    Living Arrangement Comments  going to stay with son       Resource/Environmental Concerns    Resource/Environmental Concerns  none       Transition Planning    Patient/Family Anticipates Transition to  home with family    Patient/Family Anticipated Services at Transition  none    Transportation Anticipated  family or friend will provide       Discharge Needs Assessment    Readmission Within the Last 30 Days  no previous admission in last 30 days    Concerns to be Addressed  discharge planning;basic needs    Equipment Currently Used at Home  bipap/cpap    Discharge Coordination/Progress  home with son; follow for needs        Discharge Plan     Row Name 09/02/20 1409       Plan    Plan  home with son; follow for needs    Patient/Family in Agreement with Plan  yes pt and son    Plan Comments  Checked IMM. Met with pt and son bedside. Pt lives in [place], [place] and was in [place] visiting her son. She reports she is IADLs using a CPAP at night. Pt has never used HH or SNF. Pt currently has a PCP in [place] but not in [place]. Pt reports she plans on going to her son's house at d/c until she is able to drive back to [place], [place]. At this time she declines d/c needs. CCP will continue to follow….ARCHANA Ham        Destination      Coordination has not been started for this encounter.      Durable Medical Equipment      Coordination has not been  started for this encounter.      Dialysis/Infusion      Coordination has not been started for this encounter.      Home Medical Care      Coordination has not been started for this encounter.      Therapy      Coordination has not been started for this encounter.      Community Resources      Coordination has not been started for this encounter.          Demographic Summary     Row Name 09/02/20 1401       General Information    Admission Type  inpatient    Arrived From  home    Required Notices Provided  Important Message from Medicare    Reason for Consult  discharge planning    Preferred Language  English     Used During This Interaction  no       Contact Information    Permission Granted to Share Info With  facility ;family/designee        Functional Status     Row Name 09/02/20 1402       Functional Status    Usual Activity Tolerance  good    Current Activity Tolerance  moderate       Functional Status, IADL    Medications  independent    Meal Preparation  independent    Housekeeping  independent    Laundry  independent    Shopping  independent       Mental Status    General Appearance WDL  WDL        Psychosocial    No documentation.       Abuse/Neglect    No documentation.       Legal    No documentation.       Substance Abuse    No documentation.       Patient Forms    No documentation.           JIMENEZ Stacy

## 2020-09-02 NOTE — CONSULTS
.     REASON FOR CONSULTATION:     Provide an opinion on any further workup or treatment of splenic infarction                             REQUESTING PHYSICIAN: Meliton Arellano MD     RECORDS OBTAINED:  Records of the patients history including those obtained from the referring provider were reviewed and summarized in detail.    HISTORY OF PRESENT ILLNESS:  The patient is a 60 y.o. year old female     Who is a woman having type 2 diabetes, obstructive sleep apnea, lower extremity peripheral vascular disease who presented to the emergency room yesterday because of sudden onset left abdomen pain. She initially had some pains and was treated for UTI because of increased frequency and urgency of urination. About 2-3 days ago, over the weekend she was having severe left upper abdomen pain radiating into the left lower chest and groin area. The pain was exacerbated by inspiration and movement. The patient had nausea but no vomiting. She denies odynophagia or dysphagia and she had no constipation or diarrhea, no blood per rectum.    The patient presented to the emergency room, and laboratory study reported hemoglobin 14.4, platelets 202,000 and WBC 8350 including neutrophils 5460, lymphocytes 2000. Her chemistry lab reported lipase 11, creatinine 0.66, normal liver function panel except alkaline phosphatase 130, and sodium 134 but normal potassium 3.7 and calcium 10.0. Glucose was 267.  CT scan for the abdomen and pelvis reported evidence of spleen infarction within the central to lateral aspect of the spleen.     laboratory study today reported hemoglobin 11.6, platelets 170,000, WBC 6670 with neutrophils 4550.    The patient was admitted to the hospital for further management and she was started on heparin IV for anticoagulation.     At the time of interview, the patient reports she continues to have pain in the left upper quadrant, has not eased up since admission.     I reviewed laboratory study results, for  hypercoagulable workup. She was already tested negative for factor V Leiden mutation. Other laboratory studies obtained include lupus anticoagulant, anticardiolipin antibody, antiphospholipid panel 2, and antithrombin-III.        Past Medical History:   Diagnosis Date   • Diabetes mellitus (CMS/HCC)    • UTI (urinary tract infection)      History reviewed. No pertinent surgical history.    MEDICATIONS    Current Facility-Administered Medications:   •  acetaminophen (TYLENOL) tablet 650 mg, 650 mg, Oral, Q4H PRN, 650 mg at 09/01/20 1152 **OR** acetaminophen (TYLENOL) 160 MG/5ML solution 650 mg, 650 mg, Oral, Q4H PRN **OR** acetaminophen (TYLENOL) suppository 650 mg, 650 mg, Rectal, Q4H PRN, Renée Johns APRN  •  aspirin EC tablet 81 mg, 81 mg, Oral, Daily, Michelle Gonzales MD, 81 mg at 09/01/20 1942  •  dextrose (D50W) 25 g/ 50mL Intravenous Solution 25 g, 25 g, Intravenous, Q15 Min PRN, Michelle Gonzales MD  •  dextrose (GLUTOSE) oral gel 15 g, 15 g, Oral, Q15 Min PRN, Michelle Gonzales MD  •  glucagon (human recombinant) (GLUCAGEN DIAGNOSTIC) injection 1 mg, 1 mg, Subcutaneous, Q15 Min PRN, Michelle Gonzales MD  •  heparin 34630 units/250 mL (100 units/mL) in 0.45 % NaCl infusion, 10.3 Units/kg/hr, Intravenous, Titrated, Renée Johns APRN, Last Rate: 12.96 mL/hr at 09/01/20 2128, 13.3 Units/kg/hr at 09/01/20 2128  •  insulin lispro (humaLOG) injection 0-7 Units, 0-7 Units, Subcutaneous, TID AC, Michelle Gonzales MD  •  LORazepam (ATIVAN) tablet 1 mg, 1 mg, Oral, Q6H PRN, Michelle Gonzales MD  •  morphine injection 4 mg, 4 mg, Intravenous, Q4H PRN, Michelle Gonzales MD, 4 mg at 09/02/20 1017  •  multivitamin with minerals 1 tablet, 1 tablet, Oral, Daily, Renée Johns, APRN  •  [DISCONTINUED] morphine injection 1 mg, 1 mg, Intravenous, Q4H PRN, 1 mg at 09/01/20 0958 **AND** naloxone (NARCAN) injection 0.4 mg, 0.4 mg, Intravenous, Q5 Min PRN, Renée Johns, APRN  •  nitroglycerin  (NITROSTAT) SL tablet 0.4 mg, 0.4 mg, Sublingual, Q5 Min PRN, Renée Johns APRN  •  ondansetron (ZOFRAN) 8 mg in sodium chloride 0.9 % 50 mL IVPB, 8 mg, Intravenous, Q6H PRN, Michelle Gonzales MD  •  oxyCODONE-acetaminophen (PERCOCET) 5-325 MG per tablet 1 tablet, 1 tablet, Oral, Q4H PRN, Michelle Gonzales MD, 1 tablet at 20 0148  •  pantoprazole (PROTONIX) EC tablet 40 mg, 40 mg, Oral, Q AM, Michelle Gonzales MD, 40 mg at 20 0613  •  sodium chloride 0.9 % flush 10 mL, 10 mL, Intravenous, PRN, Vineet Contreras MD  •  sodium chloride 0.9 % flush 10 mL, 10 mL, Intravenous, Q12H, Renée Johns APRN, 10 mL at 20 1002  •  sodium chloride 0.9 % flush 10 mL, 10 mL, Intravenous, PRN, Renée Johns APRN  •  sodium chloride 0.9 % infusion, 100 mL/hr, Intravenous, Continuous, Renée Johns APRN, Last Rate: 100 mL/hr at 20 0436, 100 mL/hr at 20 0436    ALLERGIES:     Allergies   Allergen Reactions   • Dilaudid [Hydromorphone Hcl] Nausea And Vomiting   • Doxycycline Hives   • Penicillins Unknown - Low Severity   • Sulfa Antibiotics Hives       SOCIAL HISTORY:       Social History     Socioeconomic History   • Marital status: Single     Spouse name: Not on file   • Number of children: Not on file   • Years of education: Not on file   • Highest education level: Not on file   Tobacco Use   • Smoking status: Former Smoker     Types: Cigarettes     Last attempt to quit: 2000     Years since quittin.6   Substance and Sexual Activity   • Alcohol use: Yes         FAMILY HISTORY:  History reviewed. No pertinent family history.    REVIEW OF SYSTEMS:  Review of Systems   Constitutional: Positive for activity change (Secondary to abdominal pain). Negative for chills, fatigue, fever and unexpected weight change.   HENT: Negative for mouth sores and sore throat.    Eyes: Negative for photophobia and visual disturbance.   Respiratory: Negative for cough and shortness of  breath.    Cardiovascular: Negative for chest pain and leg swelling.   Gastrointestinal: Positive for abdominal pain, nausea and vomiting. Negative for blood in stool, constipation and diarrhea.   Endocrine: Negative for cold intolerance and heat intolerance.   Genitourinary: Negative for dysuria and hematuria.   Musculoskeletal: Negative for back pain and joint swelling.   Skin: Negative for color change and rash.   Allergic/Immunologic: Negative for environmental allergies and immunocompromised state.   Neurological: Negative for dizziness and headaches.   Hematological: Negative for adenopathy. Does not bruise/bleed easily.   Psychiatric/Behavioral: Negative for agitation and confusion.              Vitals:    09/01/20 2251 09/01/20 2320 09/02/20 0325 09/02/20 0707   BP: 157/83  134/65  Comment: Simultaneous filing. User may be unaware of other data. 109/55   BP Location:   Right leg Left arm   Patient Position:   Lying Lying   Pulse: 74  54 51   Resp: 18  16  Comment: Simultaneous filing. User may be unaware of other data. 16   Temp:  98.4 °F (36.9 °C) 97.8 °F (36.6 °C)  Comment: Simultaneous filing. User may be unaware of other data. 97.4 °F (36.3 °C)   TempSrc:  Oral Oral  Comment: Simultaneous filing. User may be unaware of other data. Oral   SpO2: 97%  95%    Weight:       Height:         No flowsheet data found.     PHYSICAL EXAM:      CONSTITUTIONAL:  Vital signs reviewed.  Well-nourished well-developed female no distress, looks comfortable.  EYES:  Conjunctiva and lids unremarkable.    EARS,NOSE,MOUTH,THROAT:  Ears and nose appear unremarkable.  Lips appear unremarkable.  RESPIRATORY:  Normal respiratory effort.  Lungs clear to auscultation bilaterally.  CARDIOVASCULAR: Regular rhythm and rate.  Normal S1, S2.  No murmurs rubs or gallops.  No significant lower extremity edema.  GASTROINTESTINAL: Abdomen appears unremarkable.  Nontender.  No hepatomegaly.  No splenomegaly.  LYMPHATIC:  No cervical,  supraclavicular, axillary lymphadenopathy.  MUSCULOSKELETAL:  Unremarkable digits/nails.  No cyanosis or clubbing.  SKIN:  Warm.  No rashes.  PSYCHIATRIC:  Normal judgment and insight.  Normal mood and affect.      RECENT LABS:        WBC   Date Value Ref Range Status   09/02/2020 6.67 3.40 - 10.80 10*3/mm3 Final   09/01/2020 6.91 3.40 - 10.80 10*3/mm3 Final   08/31/2020 8.35 3.40 - 10.80 10*3/mm3 Final     Hemoglobin   Date Value Ref Range Status   09/02/2020 11.6 (L) 12.0 - 15.9 g/dL Final   09/01/2020 12.7 12.0 - 15.9 g/dL Final   08/31/2020 14.2 12.0 - 15.9 g/dL Final     Platelets   Date Value Ref Range Status   09/02/2020 170 140 - 450 10*3/mm3 Final   09/01/2020 184 140 - 450 10*3/mm3 Final   08/31/2020 202 140 - 450 10*3/mm3 Final     Results from last 7 days   Lab Units 09/02/20  0832 08/31/20  2227   SODIUM mmol/L 137 134*   POTASSIUM mmol/L 4.0 3.7   CHLORIDE mmol/L 103 97*   CO2 mmol/L 25.9 25.6   BUN mg/dL 5* 7*   CREATININE mg/dL 0.51* 0.66   CALCIUM mg/dL 8.6 10.0   BILIRUBIN mg/dL  --  0.4   ALK PHOS U/L  --  130*   ALT (SGPT) U/L  --  28   AST (SGOT) U/L  --  17   GLUCOSE mg/dL 281* 267*       CT ABDOMEN PELVIS WITH IV CONTRAST 9/1/2020     HISTORY: Left flank pain with onset Saturday. Left upper quadrant pain.     TECHNIQUE: CT abdomen and pelvis with intravenous and without oral  contrast.     COMPARISON: None.     FINDINGS: Lung bases appear clear and there is no basilar or pleural  effusion. There is diffuse fat infiltration of the liver. Gallbladder,  adrenal glands, pancreas, kidneys appear within normal limits. There is  no hydronephrosis.     Within the central to lateral aspect of the spleen, there is an area of  linear hypodensity with subtle volume loss at its periphery and this is  consistent with splenic infarction. There is no perisplenic fluid or  hemorrhage to suggest that this represents a laceration.     There is no bowel dilatation or evidence for bowel obstruction.  Normal  appendix is visualized. Urinary bladder is mostly decompressed.     IMPRESSION:  1. Linear low-density within the spleen is consistent with a splenic  infarction. No perisplenic fluid or other findings to suggest  laceration.  2. Diffuse fat infiltration of the liver.     Findings discussed with YULI Wyatt in the emergency room on  09/01/2020 at 12:26 AM.       Assessment/Plan     ASSESSMENT: Sudden onset acute spleen infarction. This seems unprovoked. This patient had history of peripheral vascular disease, and she was seen by vascular surgeon already today, had angiogram study done with lab results pending.     I discussed with the patient, I agree with current use of intravenous heparin for anticoagulation. When she improves with improvement of symptoms, she could be switched to oral agent such as NOAC, with Eliquis, Xarelto or Pradaxa, one of them.     I discussed with the patient to obtain more laboratory results including factor 2 mutation, antiphosphatidylserine antibody and antibeta-2 glycoprotein 1 antibodies, and also protein C and protein S profile. I explained to the patient that laboratory results currently will not be a matter of her anticoagulation, she needs to be on treatment. However it will be important in determining how long she needs to be anticoagulated. We counseled her and discussed this but anyway she needs to be anticoagulated at least for 6-12 months.     PLAN:  1. Continue IV heparin anticoagulation.  2. Obtain more laboratory studies for hypercoagulable workup including protein C and protein S profile, antiphosphatidylserine and antibeta-2 glycoprotein 1, and factor II mutation.   3. When patient improves, could consider switch to 1 of the NOAC such as Eliquis, Xarelto will Pradaxa.  4. We will arrange patient come back to see us in clinic in about 4 weeks for reevaluation.    We will follow peripherally.       Thanks for letting us participate in the care of this  patient!       CHAITANYA DOUGHERTY M.D., Ph.D.    9/2/2020

## 2020-09-03 LAB
ALBUMIN SERPL-MCNC: 3.9 G/DL (ref 3.5–5.2)
ALBUMIN/GLOB SERPL: 1.6 G/DL
ALP SERPL-CCNC: 107 U/L (ref 39–117)
ALT SERPL W P-5'-P-CCNC: 36 U/L (ref 1–33)
ANION GAP SERPL CALCULATED.3IONS-SCNC: 9.1 MMOL/L (ref 5–15)
APTT PPP: 82.2 SECONDS (ref 22.7–35.4)
AST SERPL-CCNC: 31 U/L (ref 1–32)
BASOPHILS # BLD AUTO: 0.02 10*3/MM3 (ref 0–0.2)
BASOPHILS NFR BLD AUTO: 0.3 % (ref 0–1.5)
BILIRUB SERPL-MCNC: 0.4 MG/DL (ref 0–1.2)
BUN SERPL-MCNC: 6 MG/DL (ref 8–23)
BUN/CREAT SERPL: 9.7 (ref 7–25)
CALCIUM SPEC-SCNC: 9.4 MG/DL (ref 8.6–10.5)
CARDIOLIPIN IGG SER IA-ACNC: <9 GPL U/ML (ref 0–14)
CARDIOLIPIN IGM SER IA-ACNC: <9 MPL U/ML (ref 0–12)
CHLORIDE SERPL-SCNC: 104 MMOL/L (ref 98–107)
CO2 SERPL-SCNC: 23.9 MMOL/L (ref 22–29)
CREAT SERPL-MCNC: 0.62 MG/DL (ref 0.57–1)
DEPRECATED RDW RBC AUTO: 41.2 FL (ref 37–54)
EOSINOPHIL # BLD AUTO: 0.1 10*3/MM3 (ref 0–0.4)
EOSINOPHIL NFR BLD AUTO: 1.4 % (ref 0.3–6.2)
ERYTHROCYTE [DISTWIDTH] IN BLOOD BY AUTOMATED COUNT: 12.4 % (ref 12.3–15.4)
FACTOR II, DNA ANALYSIS: NORMAL
GFR SERPL CREATININE-BSD FRML MDRD: 98 ML/MIN/1.73
GLOBULIN UR ELPH-MCNC: 2.5 GM/DL
GLUCOSE BLDC GLUCOMTR-MCNC: 196 MG/DL (ref 70–130)
GLUCOSE BLDC GLUCOMTR-MCNC: 225 MG/DL (ref 70–130)
GLUCOSE BLDC GLUCOMTR-MCNC: 235 MG/DL (ref 70–130)
GLUCOSE BLDC GLUCOMTR-MCNC: 266 MG/DL (ref 70–130)
GLUCOSE SERPL-MCNC: 251 MG/DL (ref 65–99)
HCT VFR BLD AUTO: 34.4 % (ref 34–46.6)
HGB BLD-MCNC: 11.4 G/DL (ref 12–15.9)
IMM GRANULOCYTES # BLD AUTO: 0.04 10*3/MM3 (ref 0–0.05)
IMM GRANULOCYTES NFR BLD AUTO: 0.6 % (ref 0–0.5)
LA NT DPL PPP: 37.7 SEC (ref 0–55)
LA NT DPL/LA NT HPL PPP-RTO: 0.87 RATIO (ref 0–1.4)
LA NT PLATELET PPP: 37.1 SEC (ref 0–51.9)
LUPUS ANTICOAGULANT REFLEX: NORMAL
LYMPHOCYTES # BLD AUTO: 1.56 10*3/MM3 (ref 0.7–3.1)
LYMPHOCYTES NFR BLD AUTO: 22.6 % (ref 19.6–45.3)
MCH RBC QN AUTO: 30.2 PG (ref 26.6–33)
MCHC RBC AUTO-ENTMCNC: 33.1 G/DL (ref 31.5–35.7)
MCV RBC AUTO: 91 FL (ref 79–97)
MONOCYTES # BLD AUTO: 0.4 10*3/MM3 (ref 0.1–0.9)
MONOCYTES NFR BLD AUTO: 5.8 % (ref 5–12)
NEUTROPHILS NFR BLD AUTO: 4.78 10*3/MM3 (ref 1.7–7)
NEUTROPHILS NFR BLD AUTO: 69.3 % (ref 42.7–76)
NRBC BLD AUTO-RTO: 0 /100 WBC (ref 0–0.2)
PLATELET # BLD AUTO: 188 10*3/MM3 (ref 140–450)
PMV BLD AUTO: 9.8 FL (ref 6–12)
POTASSIUM SERPL-SCNC: 3.8 MMOL/L (ref 3.5–5.2)
PROT SERPL-MCNC: 6.4 G/DL (ref 6–8.5)
RBC # BLD AUTO: 3.78 10*6/MM3 (ref 3.77–5.28)
SCREEN DRVVT: 37 SEC (ref 0–47)
SODIUM SERPL-SCNC: 137 MMOL/L (ref 136–145)
THROMBIN TIME: 19.5 SEC (ref 0–23)
TROPONIN T SERPL-MCNC: <0.01 NG/ML (ref 0–0.03)
WBC # BLD AUTO: 6.9 10*3/MM3 (ref 3.4–10.8)

## 2020-09-03 PROCEDURE — 25010000002 ONDANSETRON PER 1 MG: Performed by: INTERNAL MEDICINE

## 2020-09-03 PROCEDURE — 85306 CLOT INHIBIT PROT S FREE: CPT | Performed by: INTERNAL MEDICINE

## 2020-09-03 PROCEDURE — 81240 F2 GENE: CPT | Performed by: INTERNAL MEDICINE

## 2020-09-03 PROCEDURE — 86148 ANTI-PHOSPHOLIPID ANTIBODY: CPT | Performed by: INTERNAL MEDICINE

## 2020-09-03 PROCEDURE — 84484 ASSAY OF TROPONIN QUANT: CPT | Performed by: NURSE PRACTITIONER

## 2020-09-03 PROCEDURE — 85303 CLOT INHIBIT PROT C ACTIVITY: CPT | Performed by: INTERNAL MEDICINE

## 2020-09-03 PROCEDURE — 85025 COMPLETE CBC W/AUTO DIFF WBC: CPT | Performed by: NURSE PRACTITIONER

## 2020-09-03 PROCEDURE — 85730 THROMBOPLASTIN TIME PARTIAL: CPT | Performed by: NURSE PRACTITIONER

## 2020-09-03 PROCEDURE — 86146 BETA-2 GLYCOPROTEIN ANTIBODY: CPT | Performed by: INTERNAL MEDICINE

## 2020-09-03 PROCEDURE — 82962 GLUCOSE BLOOD TEST: CPT

## 2020-09-03 PROCEDURE — 80053 COMPREHEN METABOLIC PANEL: CPT | Performed by: INTERNAL MEDICINE

## 2020-09-03 PROCEDURE — 25010000002 HEPARIN (PORCINE) PER 1000 UNITS: Performed by: NURSE PRACTITIONER

## 2020-09-03 PROCEDURE — 25010000002 MORPHINE PER 10 MG: Performed by: INTERNAL MEDICINE

## 2020-09-03 PROCEDURE — 99232 SBSQ HOSP IP/OBS MODERATE 35: CPT | Performed by: INTERNAL MEDICINE

## 2020-09-03 PROCEDURE — 99232 SBSQ HOSP IP/OBS MODERATE 35: CPT | Performed by: STUDENT IN AN ORGANIZED HEALTH CARE EDUCATION/TRAINING PROGRAM

## 2020-09-03 RX ADMIN — ASPIRIN 81 MG: 81 TABLET, COATED ORAL at 08:27

## 2020-09-03 RX ADMIN — OXYCODONE HYDROCHLORIDE AND ACETAMINOPHEN 1 TABLET: 5; 325 TABLET ORAL at 17:08

## 2020-09-03 RX ADMIN — OXYCODONE HYDROCHLORIDE AND ACETAMINOPHEN 1 TABLET: 5; 325 TABLET ORAL at 03:12

## 2020-09-03 RX ADMIN — HEPARIN SODIUM 13.3 UNITS/KG/HR: 10000 INJECTION, SOLUTION INTRAVENOUS at 13:13

## 2020-09-03 RX ADMIN — ONDANSETRON 8 MG: 2 INJECTION INTRAMUSCULAR; INTRAVENOUS at 03:12

## 2020-09-03 RX ADMIN — MORPHINE SULFATE 4 MG: 2 INJECTION, SOLUTION INTRAMUSCULAR; INTRAVENOUS at 04:51

## 2020-09-03 RX ADMIN — PANTOPRAZOLE SODIUM 40 MG: 40 TABLET, DELAYED RELEASE ORAL at 06:06

## 2020-09-03 RX ADMIN — OXYCODONE HYDROCHLORIDE AND ACETAMINOPHEN 1 TABLET: 5; 325 TABLET ORAL at 13:03

## 2020-09-03 RX ADMIN — OXYCODONE HYDROCHLORIDE AND ACETAMINOPHEN 1 TABLET: 5; 325 TABLET ORAL at 21:32

## 2020-09-03 RX ADMIN — OXYCODONE HYDROCHLORIDE AND ACETAMINOPHEN 1 TABLET: 5; 325 TABLET ORAL at 09:01

## 2020-09-03 NOTE — PROGRESS NOTES
Name: Narcisa Hammer ADMIT: 2020   : 1960  PCP: Provider, No Known    MRN: [id number] LOS: 2 days   AGE/SEX: 60 y.o. female  ROOM: [facility]/     Subjective   Subjective   Patient feels much better.  Significant decrease in the upper left-sided abdominal pain and left-sided back pain.  No chest pain.  No shortness of breath.  No cough no wheeze and no hemoptysis.  Positive nausea and vomiting x1 today after Percocet.  No bowel movement since admission.  Feeling hungry.    Review of Systems  .  Continues with frequency and urgency but no hematuria or dysuria.  CNS.  No headache no focal neurological symptoms.     Objective   Objective   Vital Signs  Temp:  [98 °F (36.7 °C)-98.6 °F (37 °C)] 98 °F (36.7 °C)  Heart Rate:  [52-89] 58  Resp:  [16] 16  BP: (109-126)/(60-65) 126/63  SpO2:  [92 %-96 %] 92 %  on  Flow (L/min):  [2] 2;   Device (Oxygen Therapy): room air    Intake/Output Summary (Last 24 hours) at 9/3/2020 1500  Last data filed at 9/3/2020 1321  Gross per 24 hour   Intake 1960 ml   Output 1150 ml   Net 810 ml     Body mass index is 32.55 kg/m².      20  2306 20  1458 20  1814   Weight: 97.5 kg (215 lb) 97.3 kg (214 lb 9.6 oz) 97.1 kg (214 lb)     Physical Exam    General.  Middle-aged female alert oriented x3.  No pain diaphoresis or respiratory distress.  Oral cavity.  Moist mucous membrane no throat lesions or exudates  Neck.  Supple no JVD no lymphadenopathy no thyromegaly  Cardiovascular.  Regular rate and rhythm bradycardia grade 2 systolic murmur  Chest.  Clear to auscultation bilaterally with no added sounds  Abdomen.  Soft lax no tenderness no organomegaly no guarding or rebound  Extremities.  No clubbing cyanosis or edema  CNS.  No acute focal neurological deficits    Results Review:      Results from last 7 days   Lab Units 20  0657 20  0832 20  2227   SODIUM mmol/L 137 137 134*   POTASSIUM mmol/L 3.8 4.0 3.7   CHLORIDE mmol/L 104 103 97*   CO2 mmol/L  23.9 25.9 25.6   BUN mg/dL 6* 5* 7*   CREATININE mg/dL 0.62 0.51* 0.66   GLUCOSE mg/dL 251* 281* 267*   CALCIUM mg/dL 9.4 8.6 10.0   AST (SGOT) U/L 31  --  17   ALT (SGPT) U/L 36*  --  28     Estimated Creatinine Clearance: 114.7 mL/min (by C-G formula based on SCr of 0.62 mg/dL).  Results from last 7 days   Lab Units 09/02/20  0832   HEMOGLOBIN A1C % 11.10*     Results from last 7 days   Lab Units 09/03/20  1200 09/03/20  0656 09/02/20  2124 09/02/20  1645 09/02/20  0557 09/01/20  2108   GLUCOSE mg/dL 196* 235* 278* 233* 275* 250*                       Invalid input(s):  PHOS  Results from last 7 days   Lab Units 09/02/20  0832   CHOLESTEROL mg/dL 209*   TRIGLYCERIDES mg/dL 196*   HDL CHOL mg/dL 43     Results from last 7 days   Lab Units 09/03/20  0657 09/02/20  0832 09/01/20  0236 08/31/20  2227   WBC 10*3/mm3 6.90 6.67 6.91 8.35   HEMOGLOBIN g/dL 11.4* 11.6* 12.7 14.2   HEMATOCRIT % 34.4 34.0 37.7 40.9   PLATELETS 10*3/mm3 188 170 184 202   MCV fL 91.0 88.3 89.1 87.4   MCH pg 30.2 30.1 30.0 30.3   MCHC g/dL 33.1 34.1 33.7 34.7   RDW % 12.4 12.0* 12.6 12.2*   RDW-SD fl 41.2 38.7 41.1 38.7   MPV fL 9.8 9.6 9.4 9.7   NEUTROPHIL % % 69.3 68.3 67.0 65.4   LYMPHOCYTE % % 22.6 22.5 24.2 25.0   MONOCYTES % % 5.8 6.4 6.5 6.8   EOSINOPHIL % % 1.4 1.5 1.6 1.8   BASOPHIL % % 0.3 0.3 0.3 0.4   IMM GRAN % % 0.6* 1.0* 0.4 0.6*   NEUTROS ABS 10*3/mm3 4.78 4.55 4.63 5.46   LYMPHS ABS 10*3/mm3 1.56 1.50 1.67 2.09   MONOS ABS 10*3/mm3 0.40 0.43 0.45 0.57   EOS ABS 10*3/mm3 0.10 0.10 0.11 0.15   BASOS ABS 10*3/mm3 0.02 0.02 0.02 0.03   IMMATURE GRANS (ABS) 10*3/mm3 0.04 0.07* 0.03 0.05   NRBC /100 WBC 0.0 0.0 0.0 0.0     Results from last 7 days   Lab Units 09/03/20  0657 09/02/20  0832 09/01/20 2038 09/01/20  0907 09/01/20  0236   INR   --   --   --   --  0.98   APTT seconds 82.2* 71.5* 68.5* 44.7* 26.2                 Results from last 7 days   Lab Units 08/31/20  2227   LIPASE U/L 11*             Results from last 7 days   Lab  Units 09/02/20  0009 08/31/20  2235   NITRITE UA  Negative Negative   WBC UA /HPF  --  0-2   BACTERIA UA /HPF  --  None Seen   SQUAM EPITHEL UA /HPF  --  0-2           Imaging:  Imaging Results (Last 24 Hours)     Procedure Component Value Units Date/Time    CT Angiogram Chest With & Without Contrast [[id number]] Collected:  09/02/20 1447     Updated:  09/02/20 1729    Narrative:       CT ANGIOGRAM OF THE CHEST. MULTIPLE CORONAL, SAGITTAL, AND 3D  RECONSTRUCTIONS     HISTORY: Evaluate for proximal source of splenic artery thromboembolism.     TECHNIQUE: Radiation dose reduction techniques were utilized, including  automated exposure control and exposure modulation based on body size.   CT angiogram of the chest was performed. Multiple coronal, sagittal, and  3-D reconstruction images were obtained.      COMPARISON:CT AP 09/01/2020.     FINDINGS:      CHEST:  There is no hilar, mediastinal or axillary adenopathy by size criteria.  Heart is normal in size. There is no significant pericardial effusion.     There is no pulmonary consolidation, pleural effusion or pneumothorax.  Sub-6 mm pulmonary nodule within the lingula.     No significant atherosclerotic disease is visualized within the thoracic  aorta. There are a few tiny foci of calcification along the aortic arch.  While no discrete dissection flap is visualized, please note evaluation  is suboptimal due to the lack of cardiac gating performed.     ABDOMEN AND PELVIS:  Hepatic steatosis is present. The appendix is unremarkable.     There are no findings of small bowel obstruction.     While this exam is not tailored for detailed evaluation of the abdominal  viscera due to contrast timing, no gross abnormality is visualized  within the gallbladder, pancreas, adrenal glands or kidneys. There is no  hydronephrosis.     Wedge-shaped area of hypoenhancement with associated volume loss  projects through the lateral aspect of the spleen peripherally, as seen  on recent  CT.     There is no abdominopelvic adenopathy by size criteria. There is no free  intraperitoneal air or fluid.     The origins of the celiac, superior mesenteric and bilateral renal  arteries are widely patent. Minimal atherosclerotic calcification is  present within the right common iliac artery and distal aorta without  significant stenosis.     BONE WINDOWS: There are no suspicious lytic or blastic osseous lesions.       Impression:       1.  Minimal atherosclerotic calcification is present within the aortic  arch, distal aorta and right common iliac artery without significant  stenosis.  2.  Findings of evolving splenic infarct, as before.  3.  Hepatic steatosis.  4.  Sub-6 mm pulmonary nodule within the lingula. If this patient is at  increased risk for lung cancer, follow-up chest CT in 12 months can be  considered to ensure stability. If this patient is not at increased risk  for lung cancer, no further follow-up is necessary per Fleischner  criteria.  5.  Other findings as above.     This report was finalized on 9/2/2020 5:26 PM by Dr. Richar Tillman M.D.       CT Angiogram Abdomen Pelvis [[id number]] Collected:  09/02/20 1447     Updated:  09/02/20 1729    Narrative:       CT ANGIOGRAM OF THE CHEST. MULTIPLE CORONAL, SAGITTAL, AND 3D  RECONSTRUCTIONS     HISTORY: Evaluate for proximal source of splenic artery thromboembolism.     TECHNIQUE: Radiation dose reduction techniques were utilized, including  automated exposure control and exposure modulation based on body size.   CT angiogram of the chest was performed. Multiple coronal, sagittal, and  3-D reconstruction images were obtained.      COMPARISON:CT AP 09/01/2020.     FINDINGS:      CHEST:  There is no hilar, mediastinal or axillary adenopathy by size criteria.  Heart is normal in size. There is no significant pericardial effusion.     There is no pulmonary consolidation, pleural effusion or pneumothorax.  Sub-6 mm pulmonary nodule within the  lingula.     No significant atherosclerotic disease is visualized within the thoracic  aorta. There are a few tiny foci of calcification along the aortic arch.  While no discrete dissection flap is visualized, please note evaluation  is suboptimal due to the lack of cardiac gating performed.     ABDOMEN AND PELVIS:  Hepatic steatosis is present. The appendix is unremarkable.     There are no findings of small bowel obstruction.     While this exam is not tailored for detailed evaluation of the abdominal  viscera due to contrast timing, no gross abnormality is visualized  within the gallbladder, pancreas, adrenal glands or kidneys. There is no  hydronephrosis.     Wedge-shaped area of hypoenhancement with associated volume loss  projects through the lateral aspect of the spleen peripherally, as seen  on recent CT.     There is no abdominopelvic adenopathy by size criteria. There is no free  intraperitoneal air or fluid.     The origins of the celiac, superior mesenteric and bilateral renal  arteries are widely patent. Minimal atherosclerotic calcification is  present within the right common iliac artery and distal aorta without  significant stenosis.     BONE WINDOWS: There are no suspicious lytic or blastic osseous lesions.       Impression:       1.  Minimal atherosclerotic calcification is present within the aortic  arch, distal aorta and right common iliac artery without significant  stenosis.  2.  Findings of evolving splenic infarct, as before.  3.  Hepatic steatosis.  4.  Sub-6 mm pulmonary nodule within the lingula. If this patient is at  increased risk for lung cancer, follow-up chest CT in 12 months can be  considered to ensure stability. If this patient is not at increased risk  for lung cancer, no further follow-up is necessary per Fleischner  criteria.  5.  Other findings as above.     This report was finalized on 9/2/2020 5:26 PM by Dr. Richar Tillman M.D.                I reviewed the patient's new  clinical results / labs / tests / procedures      Assessment/Plan     Active Hospital Problems    Diagnosis  POA   • **Splenic infarction [D73.5]  Yes   • Hyperlipidemia [E78.5]  Yes   • Type 2 diabetes mellitus, without long-term current use of insulin (CMS/[facility]) [E11.9]  Yes   • PAD (peripheral artery disease) (CMS/[facility]) [I73.9]  Yes   • CHRISTIANA (obstructive sleep apnea) [G47.33]  Yes      Resolved Hospital Problems   No resolved problems to display.       1.  Splenic infarction leading to upper abdominal pain.  Negative lower extremity venous Doppler ultrasound.  No evidence of PE.  No history of hypercoagulable status or blood clots before.  Awaiting hypercoagulable status work-up.    2D echo of the heart without thrombus or vegetation and normal ejection fraction.  Cardiology entertaining SHERYL and 0 patch.  Vascular surgery consultation is noted and appreciated CTA of the abdomen and pelvis revealed no obvious vascular thrombus except solitary thrombus in the spleen.  Hematology consult noted agreeable with hypercoagulable status work-up and anticoagulation. Will continue the patient on heparin/Protonix and aspirin.  EKG negative for A. fib..   Will advance diet.    2.  Type 2 diabetes.  Poor control.  A1c is 11.1.   Continue sliding scale insulin for now until the p.o. intake is established will need long-acting insulin at the time of discharge.  Will ask diabetic educator to see the patient.  I counseled the patient about the risks of uncontrolled diabetes.  3.  Obstructive sleep apnea.   on CPAP at night.  4.  Frequency and urgency of urination with decreased urine output.    Negative UA.  Negative bladder scan.  Mostly overactive bladder.  Trial of Detrol at discharge.  5.  Dyslipidemia.  Statin at discharge.  6.  Lingular pulmonary nodule of 6 mm.  Outpatient CT follow-up.  6.  VTE prophylaxis.  Patient will be anticoagulated.  Discussed my findings and recommendation with the patient/son/nurse.          Michelle CABRERA  MD [facility]  09/03/20  15:00

## 2020-09-03 NOTE — NURSING NOTE
Pt requested a pain pill this AM and then refused when brought to her.  Pt did this yesterday a couple of times. Pt also conts to refuse insulin.  Pt educated.  Pt also stated that she may not need the full 8mg of Zofran.  Will cont to monitor.

## 2020-09-03 NOTE — PROGRESS NOTES
Name: Narcisa Hammer ADMIT: 2020   : 1960  PCP: Provider, No Known    MRN: [id number] LOS: 2 days   AGE/SEX: 60 y.o. female  ROOM: [address]    [place], Subsequent Hospital Care    Chief Complaint   Patient presents with   • Flank Pain     CC: Splenic embolus follow-up.  Subjective     60 y.o. female patient overall much better spirits.  She denies any further abdominal pain.  Denies centralized chest pain as well.  She is relieved to hear that her CT angiograms came back essentially normal.    Review of Systems    Objective     Scheduled Medications:     aspirin 81 mg Oral Daily   insulin lispro 0-7 Units Subcutaneous TID AC   multivitamin with minerals 1 tablet Oral Daily   pantoprazole 40 mg Oral Q AM   sodium chloride 10 mL Intravenous Q12H       Active Infusions:    heparin (porcine) 10.3 Units/kg/hr Last Rate: 13.3 Units/kg/hr (20 1313)       As Needed Medications:  •  acetaminophen **OR** acetaminophen **OR** acetaminophen  •  dextrose  •  dextrose  •  glucagon (human recombinant)  •  LORazepam  •  Morphine  •  [DISCONTINUED] Morphine **AND** naloxone  •  nitroglycerin  •  ondansetron  •  oxyCODONE-acetaminophen  •  sodium chloride  •  sodium chloride    Vital Signs  Vital Signs Patient Vitals for the past 24 hrs:   BP Temp Temp src Pulse Resp SpO2   20 1425 126/63 98 °F (36.7 °C) Oral 58 16 --   20 0728 109/65 98.6 °F (37 °C) Oral 52 16 --   20 2315 119/60 98.6 °F (37 °C) Oral 89 16 92 %   20 1900 116/62 98.2 °F (36.8 °C) Oral 63 16 96 %     I/O:  I/O last 3 completed shifts:  In: 3040 [P.O.:840; I.V.:2150; IV Piggyback:50]  Out: 2550 [Urine:2200; Emesis/NG output:350]    Physical Exam:  Physical Exam    Results Review:     CBC    Results from last 7 days   Lab Units 20  0657 20  0832 20  0236 20  2227   WBC 10*3/mm3 6.90 6.67 6.91 8.35   HEMOGLOBIN g/dL 11.4* 11.6* 12.7 14.2   PLATELETS 10*3/mm3 188 170 184 202     BMP    Results from last 7 days   Lab Units 09/03/20  0657 09/02/20  0832 08/31/20  2227   SODIUM mmol/L 137 137 134*   POTASSIUM mmol/L 3.8 4.0 3.7   CHLORIDE mmol/L 104 103 97*   CO2 mmol/L 23.9 25.9 25.6   BUN mg/dL 6* 5* 7*   CREATININE mg/dL 0.62 0.51* 0.66   GLUCOSE mg/dL 251* 281* 267*     Cr Clearance Estimated Creatinine Clearance: 114.7 mL/min (by C-G formula based on SCr of 0.62 mg/dL).  Coag   Results from last 7 days   Lab Units 09/03/20  0657 09/02/20  0832 09/01/20 2038 09/01/20  0907 09/01/20  0236   INR   --   --   --   --  0.98   APTT seconds 82.2* 71.5* 68.5* 44.7* 26.2     HbA1C   Lab Results   Component Value Date    HGBA1C 11.10 (H) 09/02/2020     Blood Glucose   Glucose   Date/Time Value Ref Range Status   09/03/2020 1618 225 (H) 70 - 130 mg/dL Final   09/03/2020 1200 196 (H) 70 - 130 mg/dL Final   09/03/2020 0656 235 (H) 70 - 130 mg/dL Final   09/02/2020 2124 278 (H) 70 - 130 mg/dL Final   09/02/2020 1645 233 (H) 70 - 130 mg/dL Final   09/02/2020 0557 275 (H) 70 - 130 mg/dL Final   09/01/2020 2108 250 (H) 70 - 130 mg/dL Final     Infection     CMP   Results from last 7 days   Lab Units 09/03/20  0657 09/02/20  0832 08/31/20  2227   SODIUM mmol/L 137 137 134*   POTASSIUM mmol/L 3.8 4.0 3.7   CHLORIDE mmol/L 104 103 97*   CO2 mmol/L 23.9 25.9 25.6   BUN mg/dL 6* 5* 7*   CREATININE mg/dL 0.62 0.51* 0.66   GLUCOSE mg/dL 251* 281* 267*   ALBUMIN g/dL 3.90  --  4.30   BILIRUBIN mg/dL 0.4  --  0.4   ALK PHOS U/L 107  --  130*   AST (SGOT) U/L 31  --  17   ALT (SGPT) U/L 36*  --  28   LIPASE U/L  --   --  11*     ABG      UA    Results from last 7 days   Lab Units 09/02/20  0009 08/31/20  2235   NITRITE UA  Negative Negative   WBC UA /HPF  --  0-2   BACTERIA UA /HPF  --  None Seen   SQUAM EPITHEL UA /HPF  --  0-2     MARGOTH  No results found for: POCMETH, POCAMPHET, POCBARBITUR, POCBENZO, POCCOCAINE, POCOPIATES, POCOXYCODO, POCPHENCYC, POCPROPOXY, POCTHC, POCTRICYC  Radiology(recent) Ct Angiogram Chest With  & Without Contrast    Result Date: 9/2/2020  1.  Minimal atherosclerotic calcification is present within the aortic arch, distal aorta and right common iliac artery without significant stenosis. 2.  Findings of evolving splenic infarct, as before. 3.  Hepatic steatosis. 4.  Sub-6 mm pulmonary nodule within the lingula. If this patient is at increased risk for lung cancer, follow-up chest CT in 12 months can be considered to ensure stability. If this patient is not at increased risk for lung cancer, no further follow-up is necessary per Fleischner criteria. 5.  Other findings as above.  This report was finalized on 9/2/2020 5:26 PM by Dr. Richar Tillman M.D.      Ct Angiogram Abdomen Pelvis    Result Date: 9/2/2020  1.  Minimal atherosclerotic calcification is present within the aortic arch, distal aorta and right common iliac artery without significant stenosis. 2.  Findings of evolving splenic infarct, as before. 3.  Hepatic steatosis. 4.  Sub-6 mm pulmonary nodule within the lingula. If this patient is at increased risk for lung cancer, follow-up chest CT in 12 months can be considered to ensure stability. If this patient is not at increased risk for lung cancer, no further follow-up is necessary per Fleischner criteria. 5.  Other findings as above.  This report was finalized on 9/2/2020 5:26 PM by Dr. Richar Tillman M.D.        Assessment/Plan     Assessment & Plan      Splenic infarction    Type 2 diabetes mellitus, without long-term current use of insulin (CMS/HCC)    PAD (peripheral artery disease) (CMS/HCC)    CHRISTIANA (obstructive sleep apnea)    Hyperlipidemia      60 y.o. female splenic infarct.  CT angiogram reveals widely patent vessels without no proximal arterial source of the emboli.  I have reviewed cardiology and hematology oncology's note.  Do not feel that I am offering much more to the equation so will sign off.  Patient states she has a history of peripheral arterial disease after some Dopplers.  I  have requested that she follow-up with me as an outpatient with ABIs to follow this.  She also asked me to look at some varicose veins on her left calf.  These all appear to be benign non-thrombosed varicose veins.  They are bulky in size.  Would recommend outpatient follow-up in the [facility] vein center with 1 of my partners who specializes in advanced venous care.    Will defer to the primary and hematology oncology service on whether or not that sub-6 mm nodule requires further evaluation.    Personal protective equipment used for this patient encounter:  Patient wearing surgical mask [x]    Provider wearing a surgical mask [x]    Gloves []    Eye protection [x]    Face Shield []    Gown []    N 95 respirator or CAPR/PAPR []   Duration of interaction 10 minutes    Nelson Villeda MD  09/03/20  16:36    Please call my office with any question: [phone]    Active Hospital Problems    Diagnosis  POA   • **Splenic infarction [D73.5]  Yes   • Hyperlipidemia [E78.5]  Yes   • Type 2 diabetes mellitus, without long-term current use of insulin (CMS/[facility]) [E11.9]  Yes   • PAD (peripheral artery disease) (CMS/[facility]) [I73.9]  Yes   • CHRISTIANA (obstructive sleep apnea) [G47.33]  Yes      Resolved Hospital Problems   No resolved problems to display.

## 2020-09-03 NOTE — PLAN OF CARE
Problem: Fall Risk (Adult)  Goal: Absence of Fall  Outcome: Ongoing (interventions implemented as appropriate)  Flowsheets (Taken 9/3/2020 0417)  Absence of Fall: making progress toward outcome     Problem: Pain, Acute (Adult)  Goal: Acceptable Pain Control/Comfort Level  Outcome: Ongoing (interventions implemented as appropriate)  Flowsheets (Taken 9/3/2020 0417)  Acceptable Pain Control/Comfort Level: making progress toward outcome     Problem: Patient Care Overview  Goal: Plan of Care Review  Outcome: Ongoing (interventions implemented as appropriate)  Flowsheets (Taken 9/3/2020 0417)  Progress: no change  Plan of Care Reviewed With: patient  Outcome Summary: Patient is alert and oriented. VSS. IV fluids and heparin gtt continued per protocol. Patient's pain seems to be better controlled this shift. Did have episode of emesis after percocet and zofran given. Patient is still complaining of urinary symptoms. She says she feels like her urine now has a smell. UOP good, no urgency or frequency. Will continue to monitor closely.     Problem: Skin Injury Risk (Adult)  Goal: Skin Health and Integrity  Outcome: Ongoing (interventions implemented as appropriate)  Flowsheets (Taken 9/3/2020 0417)  Skin Health and Integrity: making progress toward outcome

## 2020-09-03 NOTE — PROGRESS NOTES
"GENERAL SURGERY PROGRESS NOTE    SUMMARY (A/P): Ms. Narcisa Hammer is a 60 y.o. year old lady with isolated splenic infarct.  No plans for surgical intervention at this time.  Hypercoagulable work-up pending, CTA unremarkable, echo normal, duplex negative of lower extremities.  On heparin drip. Ok to advance to a regular diet from surgical standpoint. Will continue to follow.     Chief Complaint: Left flank pain    Interval Events: States her abdominal pain is quite a bit better with her current pain regimen. Tolerating FLD. Looks more comfortable.    Review of Systems: Negative for N/V, + for abdominal pain     O:/65 (BP Location: Right arm, Patient Position: Sitting)   Pulse 52   Temp 98.6 °F (37 °C) (Oral)   Resp 16   Ht 172.7 cm (67.99\")   Wt 97.1 kg (214 lb)   SpO2 92%   BMI 32.55 kg/m²       GENERAL: alert, well appearing, and in no distress, sitting up in bed  HEENT: normocephalic, atraumatic, moist mucous membranes, clear sclerae   CHEST: no increased work of breathing, no wheezes, symmetric air entry  CARDIAC: regular rate and rhythm    ABDOMEN: soft, nontender to palpation, no peritoneal signs   EXTREMITIES: no cyanosis, clubbing, or edema   SKIN: Warm and moist, no rashes    LABS  Results from last 7 days   Lab Units 09/03/20  0657 09/02/20  0832 09/01/20  0236   WBC 10*3/mm3 6.90 6.67 6.91   HEMOGLOBIN g/dL 11.4* 11.6* 12.7   HEMATOCRIT % 34.4 34.0 37.7   PLATELETS 10*3/mm3 188 170 184     Results from last 7 days   Lab Units 09/03/20  0657 09/02/20  0832 08/31/20  2227   SODIUM mmol/L 137 137 134*   POTASSIUM mmol/L 3.8 4.0 3.7   CHLORIDE mmol/L 104 103 97*   CO2 mmol/L 23.9 25.9 25.6   BUN mg/dL 6* 5* 7*   CREATININE mg/dL 0.62 0.51* 0.66   CALCIUM mg/dL 9.4 8.6 10.0   BILIRUBIN mg/dL 0.4  --  0.4   ALK PHOS U/L 107  --  130*   ALT (SGPT) U/L 36*  --  28   AST (SGOT) U/L 31  --  17   GLUCOSE mg/dL 251* 281* 267*     Results from last 7 days   Lab Units 09/03/20  0657 09/02/20  0832 " 09/01/20 2038 09/01/20 0236   INR   --   --   --   --  0.98   APTT seconds 82.2* 71.5* 68.5*   < > 26.2    < > = values in this interval not displayed.       JLUIS MATHIAS MD  General and Endoscopic Surgery  [facility]    [address], [address]  [place], [place], [address]  P: [phone]  F: [phone]

## 2020-09-03 NOTE — PLAN OF CARE
So far, pt has not requested any nausea medication.  Pain pill only during this shift.  Pain 3-4.  Heparin cont at 13.3 u/k/h.  Therapeutic.  Full liquid until diet advances.  Pt refuses insulin.  VSS.  Will cont to monitor.    Problem: Fall Risk (Adult)  Goal: Absence of Fall  Outcome: Ongoing (interventions implemented as appropriate)  Goal: Identify Related Risk Factors and Signs and Symptoms  Outcome: Ongoing (interventions implemented as appropriate)  Goal: Absence of Fall  Outcome: Ongoing (interventions implemented as appropriate)  Goal: Identify Related Risk Factors and Signs and Symptoms  Outcome: Ongoing (interventions implemented as appropriate)  Goal: Absence of Fall  Outcome: Ongoing (interventions implemented as appropriate)     Problem: Pain, Acute (Adult)  Goal: Acceptable Pain Control/Comfort Level  Outcome: Ongoing (interventions implemented as appropriate)     Problem: Patient Care Overview  Goal: Plan of Care Review  Outcome: Ongoing (interventions implemented as appropriate)  Goal: Individualization and Mutuality  Outcome: Ongoing (interventions implemented as appropriate)  Goal: Discharge Needs Assessment  Outcome: Ongoing (interventions implemented as appropriate)  Goal: Interprofessional Rounds/Family Conf  Outcome: Ongoing (interventions implemented as appropriate)     Problem: Skin Injury Risk (Adult)  Goal: Skin Health and Integrity  Outcome: Ongoing (interventions implemented as appropriate)

## 2020-09-04 ENCOUNTER — APPOINTMENT (OUTPATIENT)
Dept: CARDIOLOGY | Facility: HOSPITAL | Age: 60
End: 2020-09-04

## 2020-09-04 LAB
ANION GAP SERPL CALCULATED.3IONS-SCNC: 9.2 MMOL/L (ref 5–15)
APTT PPP: 80.7 SECONDS (ref 22.7–35.4)
AT III PPP CHRO-ACNC: 75 % (ref 90–134)
BASOPHILS # BLD AUTO: 0.03 10*3/MM3 (ref 0–0.2)
BASOPHILS NFR BLD AUTO: 0.6 % (ref 0–1.5)
BH CV ECHO MEAS - BSA(HAYCOCK): 2.2 M^2
BH CV ECHO MEAS - BSA: 2.1 M^2
BH CV ECHO MEAS - BZI_BMI: 32.5 KILOGRAMS/M^2
BH CV ECHO MEAS - BZI_METRIC_HEIGHT: 172.7 CM
BH CV ECHO MEAS - BZI_METRIC_WEIGHT: 97.1 KG
BUN SERPL-MCNC: 5 MG/DL (ref 8–23)
BUN/CREAT SERPL: 10.2 (ref 7–25)
CALCIUM SPEC-SCNC: 8.4 MG/DL (ref 8.6–10.5)
CHLORIDE SERPL-SCNC: 109 MMOL/L (ref 98–107)
CO2 SERPL-SCNC: 23.8 MMOL/L (ref 22–29)
CREAT SERPL-MCNC: 0.49 MG/DL (ref 0.57–1)
DEPRECATED RDW RBC AUTO: 41.7 FL (ref 37–54)
EOSINOPHIL # BLD AUTO: 0.17 10*3/MM3 (ref 0–0.4)
EOSINOPHIL NFR BLD AUTO: 3.3 % (ref 0.3–6.2)
ERYTHROCYTE [DISTWIDTH] IN BLOOD BY AUTOMATED COUNT: 12.2 % (ref 12.3–15.4)
GFR SERPL CREATININE-BSD FRML MDRD: 128 ML/MIN/1.73
GLUCOSE BLDC GLUCOMTR-MCNC: 187 MG/DL (ref 70–130)
GLUCOSE BLDC GLUCOMTR-MCNC: 193 MG/DL (ref 70–130)
GLUCOSE BLDC GLUCOMTR-MCNC: 202 MG/DL (ref 70–130)
GLUCOSE BLDC GLUCOMTR-MCNC: 203 MG/DL (ref 70–130)
GLUCOSE SERPL-MCNC: 188 MG/DL (ref 65–99)
HCT VFR BLD AUTO: 32.7 % (ref 34–46.6)
HGB BLD-MCNC: 10.7 G/DL (ref 12–15.9)
IMM GRANULOCYTES # BLD AUTO: 0.04 10*3/MM3 (ref 0–0.05)
IMM GRANULOCYTES NFR BLD AUTO: 0.8 % (ref 0–0.5)
INR PPP: 1.05 (ref 0.9–1.1)
LYMPHOCYTES # BLD AUTO: 1.78 10*3/MM3 (ref 0.7–3.1)
LYMPHOCYTES NFR BLD AUTO: 34.9 % (ref 19.6–45.3)
MCH RBC QN AUTO: 30.2 PG (ref 26.6–33)
MCHC RBC AUTO-ENTMCNC: 32.7 G/DL (ref 31.5–35.7)
MCV RBC AUTO: 92.4 FL (ref 79–97)
MONOCYTES # BLD AUTO: 0.43 10*3/MM3 (ref 0.1–0.9)
MONOCYTES NFR BLD AUTO: 8.4 % (ref 5–12)
NEUTROPHILS NFR BLD AUTO: 2.65 10*3/MM3 (ref 1.7–7)
NEUTROPHILS NFR BLD AUTO: 52 % (ref 42.7–76)
NRBC BLD AUTO-RTO: 0.2 /100 WBC (ref 0–0.2)
PLATELET # BLD AUTO: 156 10*3/MM3 (ref 140–450)
PMV BLD AUTO: 10 FL (ref 6–12)
POTASSIUM SERPL-SCNC: 3.9 MMOL/L (ref 3.5–5.2)
PROT C ACT/NOR PPP: 125 % (ref 86–163)
PROT S ACT/NOR PPP: 77 % (ref 70–127)
PROT S FREE PPP-ACNC: >150 % (ref 49–138)
PROTHROMBIN TIME: 13.6 SECONDS (ref 11.7–14.2)
RBC # BLD AUTO: 3.54 10*6/MM3 (ref 3.77–5.28)
RETICS # AUTO: 0.08 10*6/MM3 (ref 0.02–0.13)
RETICS/RBC NFR AUTO: 2.38 % (ref 0.7–1.9)
SODIUM SERPL-SCNC: 142 MMOL/L (ref 136–145)
TROPONIN T SERPL-MCNC: <0.01 NG/ML (ref 0–0.03)
WBC # BLD AUTO: 5.1 10*3/MM3 (ref 3.4–10.8)

## 2020-09-04 PROCEDURE — 85025 COMPLETE CBC W/AUTO DIFF WBC: CPT | Performed by: NURSE PRACTITIONER

## 2020-09-04 PROCEDURE — 93010 ELECTROCARDIOGRAM REPORT: CPT | Performed by: INTERNAL MEDICINE

## 2020-09-04 PROCEDURE — 84484 ASSAY OF TROPONIN QUANT: CPT | Performed by: NURSE PRACTITIONER

## 2020-09-04 PROCEDURE — 36415 COLL VENOUS BLD VENIPUNCTURE: CPT | Performed by: NURSE PRACTITIONER

## 2020-09-04 PROCEDURE — 85730 THROMBOPLASTIN TIME PARTIAL: CPT | Performed by: NURSE PRACTITIONER

## 2020-09-04 PROCEDURE — 85610 PROTHROMBIN TIME: CPT | Performed by: NURSE PRACTITIONER

## 2020-09-04 PROCEDURE — 82962 GLUCOSE BLOOD TEST: CPT

## 2020-09-04 PROCEDURE — 93312 ECHO TRANSESOPHAGEAL: CPT | Performed by: INTERNAL MEDICINE

## 2020-09-04 PROCEDURE — 99152 MOD SED SAME PHYS/QHP 5/>YRS: CPT

## 2020-09-04 PROCEDURE — 25010000002 MORPHINE PER 10 MG: Performed by: INTERNAL MEDICINE

## 2020-09-04 PROCEDURE — 99232 SBSQ HOSP IP/OBS MODERATE 35: CPT | Performed by: STUDENT IN AN ORGANIZED HEALTH CARE EDUCATION/TRAINING PROGRAM

## 2020-09-04 PROCEDURE — 63710000001 INSULIN LISPRO (HUMAN) PER 5 UNITS: Performed by: INTERNAL MEDICINE

## 2020-09-04 PROCEDURE — 85045 AUTOMATED RETICULOCYTE COUNT: CPT | Performed by: INTERNAL MEDICINE

## 2020-09-04 PROCEDURE — 25010000002 HEPARIN (PORCINE) PER 1000 UNITS: Performed by: NURSE PRACTITIONER

## 2020-09-04 PROCEDURE — 93005 ELECTROCARDIOGRAM TRACING: CPT | Performed by: NURSE PRACTITIONER

## 2020-09-04 PROCEDURE — 93325 DOPPLER ECHO COLOR FLOW MAPG: CPT

## 2020-09-04 PROCEDURE — 93320 DOPPLER ECHO COMPLETE: CPT

## 2020-09-04 PROCEDURE — 93325 DOPPLER ECHO COLOR FLOW MAPG: CPT | Performed by: INTERNAL MEDICINE

## 2020-09-04 PROCEDURE — 99232 SBSQ HOSP IP/OBS MODERATE 35: CPT | Performed by: INTERNAL MEDICINE

## 2020-09-04 PROCEDURE — 93312 ECHO TRANSESOPHAGEAL: CPT

## 2020-09-04 PROCEDURE — 25010000002 MIDAZOLAM PER 1 MG: Performed by: INTERNAL MEDICINE

## 2020-09-04 PROCEDURE — 93320 DOPPLER ECHO COMPLETE: CPT | Performed by: INTERNAL MEDICINE

## 2020-09-04 PROCEDURE — 25010000002 ONDANSETRON PER 1 MG: Performed by: INTERNAL MEDICINE

## 2020-09-04 PROCEDURE — 25010000002 FENTANYL CITRATE (PF) 100 MCG/2ML SOLUTION: Performed by: INTERNAL MEDICINE

## 2020-09-04 PROCEDURE — 80048 BASIC METABOLIC PNL TOTAL CA: CPT | Performed by: INTERNAL MEDICINE

## 2020-09-04 RX ORDER — LIDOCAINE HYDROCHLORIDE 20 MG/ML
SOLUTION OROPHARYNGEAL
Status: COMPLETED | OUTPATIENT
Start: 2020-09-04 | End: 2020-09-04

## 2020-09-04 RX ORDER — POLYETHYLENE GLYCOL 3350 17 G/17G
17 POWDER, FOR SOLUTION ORAL DAILY
Status: DISCONTINUED | OUTPATIENT
Start: 2020-09-04 | End: 2020-09-05 | Stop reason: HOSPADM

## 2020-09-04 RX ORDER — MIDAZOLAM HYDROCHLORIDE 1 MG/ML
INJECTION INTRAMUSCULAR; INTRAVENOUS
Status: COMPLETED | OUTPATIENT
Start: 2020-09-04 | End: 2020-09-04

## 2020-09-04 RX ORDER — FENTANYL CITRATE 50 UG/ML
INJECTION, SOLUTION INTRAMUSCULAR; INTRAVENOUS
Status: COMPLETED | OUTPATIENT
Start: 2020-09-04 | End: 2020-09-04

## 2020-09-04 RX ADMIN — FENTANYL CITRATE 25 MCG: 50 INJECTION INTRAMUSCULAR; INTRAVENOUS at 13:54

## 2020-09-04 RX ADMIN — FENTANYL CITRATE 25 MCG: 50 INJECTION INTRAMUSCULAR; INTRAVENOUS at 13:49

## 2020-09-04 RX ADMIN — MIDAZOLAM 2 MG: 1 INJECTION INTRAMUSCULAR; INTRAVENOUS at 13:49

## 2020-09-04 RX ADMIN — INSULIN LISPRO 2 UNITS: 100 INJECTION, SOLUTION INTRAVENOUS; SUBCUTANEOUS at 17:58

## 2020-09-04 RX ADMIN — MORPHINE SULFATE 4 MG: 2 INJECTION, SOLUTION INTRAMUSCULAR; INTRAVENOUS at 21:01

## 2020-09-04 RX ADMIN — MORPHINE SULFATE 4 MG: 2 INJECTION, SOLUTION INTRAMUSCULAR; INTRAVENOUS at 15:01

## 2020-09-04 RX ADMIN — LIDOCAINE HYDROCHLORIDE 10 ML: 20 SOLUTION ORAL; TOPICAL at 13:44

## 2020-09-04 RX ADMIN — MIDAZOLAM 2 MG: 1 INJECTION INTRAMUSCULAR; INTRAVENOUS at 13:53

## 2020-09-04 RX ADMIN — MORPHINE SULFATE 4 MG: 2 INJECTION, SOLUTION INTRAMUSCULAR; INTRAVENOUS at 06:48

## 2020-09-04 RX ADMIN — MORPHINE SULFATE 4 MG: 2 INJECTION, SOLUTION INTRAMUSCULAR; INTRAVENOUS at 01:55

## 2020-09-04 RX ADMIN — SODIUM CHLORIDE, PRESERVATIVE FREE 10 ML: 5 INJECTION INTRAVENOUS at 21:02

## 2020-09-04 RX ADMIN — ONDANSETRON 4 MG: 2 INJECTION INTRAMUSCULAR; INTRAVENOUS at 13:47

## 2020-09-04 RX ADMIN — POLYETHYLENE GLYCOL 3350 17 G: 17 POWDER, FOR SOLUTION ORAL at 17:58

## 2020-09-04 RX ADMIN — HEPARIN SODIUM 13.3 UNITS/KG/HR: 10000 INJECTION, SOLUTION INTRAVENOUS at 05:29

## 2020-09-04 NOTE — PLAN OF CARE
Problem: Fall Risk (Adult)  Goal: Absence of Fall  Outcome: Ongoing (interventions implemented as appropriate)  Flowsheets (Taken 9/4/2020 0500)  Absence of Fall: making progress toward outcome     Problem: Pain, Acute (Adult)  Goal: Acceptable Pain Control/Comfort Level  Outcome: Ongoing (interventions implemented as appropriate)  Flowsheets (Taken 9/4/2020 0500)  Acceptable Pain Control/Comfort Level: making progress toward outcome     Problem: Patient Care Overview  Goal: Plan of Care Review  Outcome: Ongoing (interventions implemented as appropriate)  Flowsheets (Taken 9/4/2020 0500)  Progress: no change  Plan of Care Reviewed With: patient  Outcome Summary: Patient is alert and oriented. VSS. SB. Medicated for pain PRN. Was taking percocets with good relief when able to eat. NPO at midnight- taking morphine for pain at this time. SHERYL planned for today. Remains on heparin gtt, Ptt therapeutic. Will be notified of when to stop gtt per Dr. Ngo. Patient appears very anxious about todays procedure. Offered PRN ativan, patient refuses. Will continue to monitor.     Problem: Skin Injury Risk (Adult)  Goal: Skin Health and Integrity  Outcome: Ongoing (interventions implemented as appropriate)  Flowsheets (Taken 9/4/2020 0500)  Skin Health and Integrity: making progress toward outcome

## 2020-09-04 NOTE — PROGRESS NOTES
Name: Narcisa Hammer ADMIT: 2020   : 1960  PCP: Provider, No Known    MRN: [id number] LOS: 3 days   AGE/SEX: 60 y.o. female  ROOM: [facility]/     Subjective   Subjective   Minimal upper left-sided abdominal pain and left-sided back pain.  No chest pain.  No shortness of breath.  No cough no wheeze and no hemoptysis.  No nausea or vomiting.  Tolerated regular diet well..  No bowel movement since admission.      Review of Systems  .  Improved frequency and urgency but no hematuria or dysuria.  CNS.  No headache no focal neurological symptoms.     Objective   Objective   Vital Signs  Temp:  [98.1 °F (36.7 °C)-98.3 °F (36.8 °C)] 98.2 °F (36.8 °C)  Heart Rate:  [52-70] 58  Resp:  [16] 16  BP: (105-163)/(47-91) 119/79  SpO2:  [90 %-100 %] 96 %  on  Flow (L/min):  [2] 2;   Device (Oxygen Therapy): room air    Intake/Output Summary (Last 24 hours) at 2020 1727  Last data filed at 2020 1700  Gross per 24 hour   Intake 1080 ml   Output --   Net 1080 ml     Body mass index is 32.55 kg/m².      20  1458 20  1814 20  1432   Weight: 97.3 kg (214 lb 9.6 oz) 97.1 kg (214 lb) 97.1 kg (214 lb)     Physical Exam    General.  Middle-aged female alert oriented x3.  No pain diaphoresis or respiratory distress.  Oral cavity.  Moist mucous membrane no throat lesions or exudates  Neck.  Supple no JVD no lymphadenopathy no thyromegaly  Cardiovascular.  Regular rate and rhythm bradycardia grade 2 systolic murmur  Chest.  Clear to auscultation bilaterally with no added sounds  Abdomen.  Soft lax no tenderness no organomegaly no guarding or rebound  Extremities.  No clubbing cyanosis or edema  CNS.  No acute focal neurological deficits    Results Review:      Results from last 7 days   Lab Units 20  0615 20  0657 20  0832 20  2227   SODIUM mmol/L 142 137 137 134*   POTASSIUM mmol/L 3.9 3.8 4.0 3.7   CHLORIDE mmol/L 109* 104 103 97*   CO2 mmol/L 23.8 23.9 25.9 25.6   BUN mg/dL 5* 6*  5* 7*   CREATININE mg/dL 0.49* 0.62 0.51* 0.66   GLUCOSE mg/dL 188* 251* 281* 267*   CALCIUM mg/dL 8.4* 9.4 8.6 10.0   AST (SGOT) U/L  --  31  --  17   ALT (SGPT) U/L  --  36*  --  28     Estimated Creatinine Clearance: 145.1 mL/min (A) (by C-G formula based on SCr of 0.49 mg/dL (L)).  Results from last 7 days   Lab Units 09/02/20  0832   HEMOGLOBIN A1C % 11.10*     Results from last 7 days   Lab Units 09/04/20  1654 09/04/20  1105 09/04/20  0850 09/03/20  2058 09/03/20  1618 09/03/20  1200 09/03/20  0656 09/02/20  2124   GLUCOSE mg/dL 203* 193* 187* 266* 225* 196* 235* 278*     Results from last 7 days   Lab Units 09/04/20  0615 09/03/20  1522   TROPONIN T ng/mL <0.010 <0.010                   Invalid input(s):  PHOS  Results from last 7 days   Lab Units 09/02/20  0832   CHOLESTEROL mg/dL 209*   TRIGLYCERIDES mg/dL 196*   HDL CHOL mg/dL 43     Results from last 7 days   Lab Units 09/04/20  0615 09/03/20  0657 09/02/20  0832 09/01/20  0236 08/31/20  2227   WBC 10*3/mm3 5.10 6.90 6.67 6.91 8.35   HEMOGLOBIN g/dL 10.7* 11.4* 11.6* 12.7 14.2   HEMATOCRIT % 32.7* 34.4 34.0 37.7 40.9   PLATELETS 10*3/mm3 156 188 170 184 202   MCV fL 92.4 91.0 88.3 89.1 87.4   MCH pg 30.2 30.2 30.1 30.0 30.3   MCHC g/dL 32.7 33.1 34.1 33.7 34.7   RDW % 12.2* 12.4 12.0* 12.6 12.2*   RDW-SD fl 41.7 41.2 38.7 41.1 38.7   MPV fL 10.0 9.8 9.6 9.4 9.7   NEUTROPHIL % % 52.0 69.3 68.3 67.0 65.4   LYMPHOCYTE % % 34.9 22.6 22.5 24.2 25.0   MONOCYTES % % 8.4 5.8 6.4 6.5 6.8   EOSINOPHIL % % 3.3 1.4 1.5 1.6 1.8   BASOPHIL % % 0.6 0.3 0.3 0.3 0.4   IMM GRAN % % 0.8* 0.6* 1.0* 0.4 0.6*   NEUTROS ABS 10*3/mm3 2.65 4.78 4.55 4.63 5.46   LYMPHS ABS 10*3/mm3 1.78 1.56 1.50 1.67 2.09   MONOS ABS 10*3/mm3 0.43 0.40 0.43 0.45 0.57   EOS ABS 10*3/mm3 0.17 0.10 0.10 0.11 0.15   BASOS ABS 10*3/mm3 0.03 0.02 0.02 0.02 0.03   IMMATURE GRANS (ABS) 10*3/mm3 0.04 0.04 0.07* 0.03 0.05   NRBC /100 WBC 0.2 0.0 0.0 0.0 0.0     Results from last 7 days   Lab Units  09/04/20  0615 09/03/20  0657 09/02/20  0832 09/01/20 2038 09/01/20  0907 09/01/20  0236   INR  1.05  --   --   --   --  0.98   APTT seconds 80.7* 82.2* 71.5* 68.5* 44.7* 26.2                 Results from last 7 days   Lab Units 08/31/20  2227   LIPASE U/L 11*             Results from last 7 days   Lab Units 09/02/20  0009 08/31/20  2235   NITRITE UA  Negative Negative   WBC UA /HPF  --  0-2   BACTERIA UA /HPF  --  None Seen   SQUAM EPITHEL UA /HPF  --  0-2           Imaging:  Imaging Results (Last 24 Hours)     ** No results found for the last 24 hours. **             I reviewed the patient's new clinical results / labs / tests / procedures      Assessment/Plan     Active Hospital Problems    Diagnosis  POA   • **Splenic infarction [D73.5]  Yes   • Hyperlipidemia [E78.5]  Yes   • Type 2 diabetes mellitus, without long-term current use of insulin (CMS/[facility]) [E11.9]  Yes   • PAD (peripheral artery disease) (CMS/[facility]) [I73.9]  Yes   • CHRISTIANA (obstructive sleep apnea) [G47.33]  Yes      Resolved Hospital Problems   No resolved problems to display.       1.  Splenic infarction leading to upper abdominal pain.  Negative lower extremity venous Doppler ultrasound.  No evidence of PE.  No history of hypercoagulable status or blood clots before.  Awaiting hypercoagulable status work-up.    2D echo of the heart without thrombus or vegetation and normal ejection fraction.  Cardiology entertaining SHERYL and 0 patch.  Vascular surgery consultation is noted and appreciated CTA of the abdomen and pelvis revealed no obvious vascular thrombus except solitary thrombus in the spleen.  Hematology consult noted agreeable with hypercoagulable status work-up and anticoagulation.  Hypercoagulable status is pending.. Will continue the patient on heparin/Protonix and aspirin.  EKG negative for A. fib.. Tolerating diet advancement well.  Status post SHERYL today with pulmonary report of no vegetation or thrombus awaiting official report.  If SHERYL is  negative will switch the patient to p.o. Eliquis continue p.o. aspirin and proton pump inhibitors and discharged home tomorrow if okay with cardiology/hematology/surgery.  2.  Type 2 diabetes.  Poor control.  A1c is 11.1.   Continue sliding scale insulin for now until the p.o. intake is established will need long-acting insulin at the time of discharge.  Status post diabetic educator consultation..  I counseled the patient about the risks of uncontrolled diabetes.  3.  Obstructive sleep apnea.   on CPAP at night.  4.  Frequency and urgency of urination with decreased urine output.    Negative UA.  Negative bladder scan.  Mostly overactive bladder.  Trial of Detrol at discharge.  5.  Dyslipidemia.  Statin at discharge.  6.  Lingular pulmonary nodule of 6 mm.  Outpatient CT follow-up.  7.  Anemia.  Will work-up.  Monitor hemoglobin.  8. VTE prophylaxis.  Patient will be anticoagulated.    Discussed my findings and recommendation with the patient/son/nurse.          Michelle Gonzales MD  [facility]  09/04/20  17:27

## 2020-09-04 NOTE — PROGRESS NOTES
Continued Stay Note  [facility]     Patient Name: Narcisa Hammer  MRN: [id number]  Today's Date: 9/4/2020    Admit Date: 8/31/2020    Discharge Plan     Row Name 09/04/20 1309       Plan    Plan  Plan home with son.  LAURI Coombs RN    Patient/Family in Agreement with Plan  yes    Plan Comments  Spoke with pt and pt's son  (Nik Walker [phone]) at bedside.  Pt denies any needs at present.  Pt will be going to son's home in [place] at discharge for a couple of weeks.  Plan home to son's house.   LAURI Coombs RN        Discharge Codes    No documentation.             Shira Coombs, RN

## 2020-09-04 NOTE — PLAN OF CARE
Problem: Fall Risk (Adult)  Goal: Absence of Fall  Outcome: Ongoing (interventions implemented as appropriate)  Goal: Identify Related Risk Factors and Signs and Symptoms  Outcome: Ongoing (interventions implemented as appropriate)  Goal: Absence of Fall  Outcome: Ongoing (interventions implemented as appropriate)  Goal: Absence of Fall  Outcome: Ongoing (interventions implemented as appropriate)     Problem: Pain, Acute (Adult)  Goal: Acceptable Pain Control/Comfort Level  Outcome: Ongoing (interventions implemented as appropriate)     Problem: Patient Care Overview  Goal: Plan of Care Review  Outcome: Ongoing (interventions implemented as appropriate)  Flowsheets  Taken 9/4/2020 1603  Progress: no change  Outcome Summary: Patient alert and oriented. Complaints of constant pain to left lower abdomen radiating to back. No complaints of SOA or nausea at this time. Vital signs stable. SHERYL performed today. Continued heparin gtt, PTT therapeutic. Up ad joann. No acute distress noted at this time. Nursing will continue to monitor.  Taken 9/4/2020 1454  Plan of Care Reviewed With: patient  Goal: Individualization and Mutuality  Outcome: Ongoing (interventions implemented as appropriate)  Goal: Discharge Needs Assessment  Outcome: Ongoing (interventions implemented as appropriate)  Goal: Interprofessional Rounds/Family Conf  Outcome: Ongoing (interventions implemented as appropriate)     Problem: Skin Injury Risk (Adult)  Goal: Skin Health and Integrity  Outcome: Ongoing (interventions implemented as appropriate)

## 2020-09-04 NOTE — PROGRESS NOTES
"GENERAL SURGERY PROGRESS NOTE    SUMMARY (A/P): Ms. Narcisa Hammer is a 60 y.o. year old lady with isolated splenic infarct.  No plans for surgical intervention at this time, continue pain control, regular diet post-procedure. Will add bowel regimen for constipation.  Hypercoagulable work-up pending, SHERYL today.    Chief Complaint: Left flank pain    Interval Events: States her abdominal pain is stable. Tolerated a regular diet. No BM since Sunday.     Review of Systems: Negative for N/V, + for abdominal pain     O:/84 (BP Location: Right arm, Patient Position: Lying)   Pulse 58   Temp 98.2 °F (36.8 °C) (Oral)   Resp 16   Ht 172.7 cm (67.99\")   Wt 97.1 kg (214 lb)   SpO2 98%   BMI 32.55 kg/m²       GENERAL: alert, well appearing, in no distress, sitting up in bed  HEENT: normocephalic, atraumatic, moist mucous membranes, clear sclerae   CHEST: no increased work of breathing, no wheezes, symmetric air entry  CARDIAC: regular rate and rhythm    ABDOMEN: soft, nontender to palpation, no peritoneal signs, not distended  EXTREMITIES: no cyanosis, clubbing, or edema   SKIN: Warm and moist, no rashes    LABS  Results from last 7 days   Lab Units 09/04/20  0615 09/03/20  0657 09/02/20  0832   WBC 10*3/mm3 5.10 6.90 6.67   HEMOGLOBIN g/dL 10.7* 11.4* 11.6*   HEMATOCRIT % 32.7* 34.4 34.0   PLATELETS 10*3/mm3 156 188 170     Results from last 7 days   Lab Units 09/04/20 0615 09/03/20  0657 09/02/20  0832 08/31/20  2227   SODIUM mmol/L 142 137 137 134*   POTASSIUM mmol/L 3.9 3.8 4.0 3.7   CHLORIDE mmol/L 109* 104 103 97*   CO2 mmol/L 23.8 23.9 25.9 25.6   BUN mg/dL 5* 6* 5* 7*   CREATININE mg/dL 0.49* 0.62 0.51* 0.66   CALCIUM mg/dL 8.4* 9.4 8.6 10.0   BILIRUBIN mg/dL  --  0.4  --  0.4   ALK PHOS U/L  --  107  --  130*   ALT (SGPT) U/L  --  36*  --  28   AST (SGOT) U/L  --  31  --  17   GLUCOSE mg/dL 188* 251* 281* 267*     Results from last 7 days   Lab Units 09/04/20  0615 09/03/20  0657 09/02/20  0832  " 09/01/20  0236   INR  1.05  --   --   --  0.98   APTT seconds 80.7* 82.2* 71.5*   < > 26.2    < > = values in this interval not displayed.       JLUIS MATHIAS MD  General and Endoscopic Surgery  [facility]    [address], [address]  [place], [place], [address]  P: [phone]  F: [phone]

## 2020-09-04 NOTE — PROGRESS NOTES
"[facility]  Cardiology Progress Note    Patient Identification:  Name: Narcisa Hammer  Age: 60 y.o.  Sex: female  :  1960  MRN: [id number]                 Follow Up / Chief Complaint: Management recommendations cardiac source of thrombus    Interval History: Echo revealed EF 60%, LVH mild, LV diastolic dysfunction normal, see full report as below.       Subjective:       Objective:    Past Medical History:  Past Medical History:   Diagnosis Date   • Diabetes mellitus (CMS/HCC)    • UTI (urinary tract infection)      Past Surgical History:  History reviewed. No pertinent surgical history.     Social History:   Social History     Tobacco Use   • Smoking status: Former Smoker     Types: Cigarettes     Last attempt to quit: 2000     Years since quittin.6   Substance Use Topics   • Alcohol use: Yes      Family History:  History reviewed. No pertinent family history.       Allergies:  Allergies   Allergen Reactions   • Dilaudid [Hydromorphone Hcl] Nausea And Vomiting   • Doxycycline Hives   • Penicillins Unknown - Low Severity   • Sulfa Antibiotics Hives     Scheduled Meds:    aspirin 81 mg Daily   insulin lispro 0-7 Units TID AC   multivitamin with minerals 1 tablet Daily   pantoprazole 40 mg Q AM   sodium chloride 10 mL Q12H           INTAKE AND OUTPUT:    Intake/Output Summary (Last 24 hours) at 2020 1616  Last data filed at 2020 1508  Gross per 24 hour   Intake 720 ml   Output --   Net 720 ml       ROS  Constitutional: Awake and alert, no fever. No nosebleeds  Abdomen           no abdominal pain   Cardiac              no chest pain  Pulmonary          no shortness of breath      /79 (BP Location: Right arm, Patient Position: Lying)   Pulse 58   Temp 98.2 °F (36.8 °C) (Oral)   Resp 16   Ht 172.7 cm (67.99\")   Wt 97.1 kg (214 lb)   SpO2 96%   BMI 32.55 kg/m²   General appearance: No acute changes   Neck: Trachea midline; NECK, supple, no thyromegaly or lymphadenopathy "   Lungs: Normal size and shape, normal breath sounds, equal distribution of air, no rales and rhonchi   CV: S1-S2 regular, no murmurs, no rub, no gallop   Abdomen: Soft, non-tender; no masses , no abnormal abdominal sounds   Extremities: No deformity , normal color , no peripheral edema   Skin: Normal temperature, turgor and texture; no rash, ulcers              Cardiographics  Telemetry:   SR          ECG:     Echocardiogram:     SHERYL pending final read    Interpretation Summary     · Left ventricular systolic function is normal. Calculated EF = 68%  · Left ventricular wall thickness is consistent with mild concentric hypertrophy.  · Left ventricular diastolic function is normal  · Normal right ventricular cavity size and systolic function noted.  · Saline test results are negative.  · There is no evidence of pericardial effusion.     Study Result     CT ANGIOGRAM OF THE CHEST. MULTIPLE CORONAL, SAGITTAL, AND 3D  RECONSTRUCTIONS     HISTORY: Evaluate for proximal source of splenic artery thromboembolism.     TECHNIQUE: Radiation dose reduction techniques were utilized, including  automated exposure control and exposure modulation based on body size.   CT angiogram of the chest was performed. Multiple coronal, sagittal, and  3-D reconstruction images were obtained.      COMPARISON:CT AP 09/01/2020.     FINDINGS:      CHEST:  There is no hilar, mediastinal or axillary adenopathy by size criteria.  Heart is normal in size. There is no significant pericardial effusion.     There is no pulmonary consolidation, pleural effusion or pneumothorax.  Sub-6 mm pulmonary nodule within the lingula.     No significant atherosclerotic disease is visualized within the thoracic  aorta. There are a few tiny foci of calcification along the aortic arch.  While no discrete dissection flap is visualized, please note evaluation  is suboptimal due to the lack of cardiac gating performed.     ABDOMEN AND PELVIS:  Hepatic steatosis is  present. The appendix is unremarkable.     There are no findings of small bowel obstruction.     While this exam is not tailored for detailed evaluation of the abdominal  viscera due to contrast timing, no gross abnormality is visualized  within the gallbladder, pancreas, adrenal glands or kidneys. There is no  hydronephrosis.     Wedge-shaped area of hypoenhancement with associated volume loss  projects through the lateral aspect of the spleen peripherally, as seen  on recent CT.     There is no abdominopelvic adenopathy by size criteria. There is no free  intraperitoneal air or fluid.     The origins of the celiac, superior mesenteric and bilateral renal  arteries are widely patent. Minimal atherosclerotic calcification is  present within the right common iliac artery and distal aorta without  significant stenosis.     BONE WINDOWS: There are no suspicious lytic or blastic osseous lesions.     IMPRESSION:  1.  Minimal atherosclerotic calcification is present within the aortic  arch, distal aorta and right common iliac artery without significant  stenosis.  2.  Findings of evolving splenic infarct, as before.  3.  Hepatic steatosis.  4.  Sub-6 mm pulmonary nodule within the lingula. If this patient is at  increased risk for lung cancer, follow-up chest CT in 12 months can be  considered to ensure stability. If this patient is not at increased risk  for lung cancer, no further follow-up is necessary per Fleischner  criteria.  5.  Other findings as above.     This report was finalized on 9/2/2020 5:26 PM by Dr. Richar Tillman M.D.          Lab Review   Results from last 7 days   Lab Units 09/04/20  0615 09/03/20  1522   TROPONIN T ng/mL <0.010 <0.010         Results from last 7 days   Lab Units 09/04/20  0615   SODIUM mmol/L 142   POTASSIUM mmol/L 3.9   BUN mg/dL 5*   CREATININE mg/dL 0.49*   CALCIUM mg/dL 8.4*        Results from last 7 days   Lab Units 09/04/20  0615 09/03/20  0657 09/02/20  0832   WBC 10*3/mm3  "5.10 6.90 6.67   HEMOGLOBIN g/dL 10.7* 11.4* 11.6*   HEMATOCRIT % 32.7* 34.4 34.0   PLATELETS 10*3/mm3 156 188 170     Results from last 7 days   Lab Units 09/04/20  0615 09/03/20  0657 09/02/20  0832  09/01/20  0236   INR  1.05  --   --   --  0.98   APTT seconds 80.7* 82.2* 71.5*   < > 26.2    < > = values in this interval not displayed.       Estimated Creatinine Clearance: 145.1 mL/min (A) (by C-G formula based on SCr of 0.49 mg/dL (L)).    The following medical decision was discussed in detail with Dr. Ngo    Assessment:  Splenic infarct etiology questionable   History of angina in the past  Sleep apnea  Diabetes  PVD      Plan:  SHERYL fails to show any thrombus  Patient remains in normal sinus rhythm  Patient will need ZIO Patch  No definite cardiac indication for anticoagulation    )9/4/2020   Sonali Ngo MD      [facility] Dragon/Transcription:   \"Dictated utilizing Dragon dictation\".     "

## 2020-09-05 VITALS
OXYGEN SATURATION: 95 % | SYSTOLIC BLOOD PRESSURE: 116 MMHG | WEIGHT: 214 LBS | BODY MASS INDEX: 32.43 KG/M2 | HEART RATE: 64 BPM | RESPIRATION RATE: 16 BRPM | HEIGHT: 68 IN | DIASTOLIC BLOOD PRESSURE: 75 MMHG | TEMPERATURE: 98.1 F

## 2020-09-05 PROBLEM — R91.1 PULMONARY NODULE, LEFT: Status: ACTIVE | Noted: 2020-09-05

## 2020-09-05 LAB
APTT PPP: 87.8 SECONDS (ref 22.7–35.4)
B2 GLYCOPROT1 IGA SER-ACNC: <9 GPI IGA UNITS (ref 0–25)
B2 GLYCOPROT1 IGG SER-ACNC: <9 GPI IGG UNITS (ref 0–20)
B2 GLYCOPROT1 IGM SER-ACNC: <9 GPI IGM UNITS (ref 0–32)
BASOPHILS # BLD AUTO: 0.02 10*3/MM3 (ref 0–0.2)
BASOPHILS NFR BLD AUTO: 0.3 % (ref 0–1.5)
DEPRECATED RDW RBC AUTO: 40.9 FL (ref 37–54)
EOSINOPHIL # BLD AUTO: 0.14 10*3/MM3 (ref 0–0.4)
EOSINOPHIL NFR BLD AUTO: 2.4 % (ref 0.3–6.2)
ERYTHROCYTE [DISTWIDTH] IN BLOOD BY AUTOMATED COUNT: 12.3 % (ref 12.3–15.4)
FERRITIN SERPL-MCNC: 280 NG/ML (ref 13–150)
FOLATE SERPL-MCNC: 16.6 NG/ML (ref 4.78–24.2)
GLUCOSE BLDC GLUCOMTR-MCNC: 190 MG/DL (ref 70–130)
GLUCOSE BLDC GLUCOMTR-MCNC: 231 MG/DL (ref 70–130)
GLUCOSE BLDC GLUCOMTR-MCNC: 236 MG/DL (ref 70–130)
HCT VFR BLD AUTO: 32 % (ref 34–46.6)
HGB BLD-MCNC: 10.5 G/DL (ref 12–15.9)
IMM GRANULOCYTES # BLD AUTO: 0.05 10*3/MM3 (ref 0–0.05)
IMM GRANULOCYTES NFR BLD AUTO: 0.9 % (ref 0–0.5)
IRON 24H UR-MRATE: 47 MCG/DL (ref 37–145)
IRON SATN MFR SERPL: 16 % (ref 20–50)
LYMPHOCYTES # BLD AUTO: 1.75 10*3/MM3 (ref 0.7–3.1)
LYMPHOCYTES NFR BLD AUTO: 30.1 % (ref 19.6–45.3)
MCH RBC QN AUTO: 30.1 PG (ref 26.6–33)
MCHC RBC AUTO-ENTMCNC: 32.8 G/DL (ref 31.5–35.7)
MCV RBC AUTO: 91.7 FL (ref 79–97)
MONOCYTES # BLD AUTO: 0.43 10*3/MM3 (ref 0.1–0.9)
MONOCYTES NFR BLD AUTO: 7.4 % (ref 5–12)
NEUTROPHILS NFR BLD AUTO: 3.42 10*3/MM3 (ref 1.7–7)
NEUTROPHILS NFR BLD AUTO: 58.9 % (ref 42.7–76)
NRBC BLD AUTO-RTO: 0 /100 WBC (ref 0–0.2)
PLATELET # BLD AUTO: 150 10*3/MM3 (ref 140–450)
PMV BLD AUTO: 9.9 FL (ref 6–12)
RBC # BLD AUTO: 3.49 10*6/MM3 (ref 3.77–5.28)
TIBC SERPL-MCNC: 286 MCG/DL (ref 298–536)
TRANSFERRIN SERPL-MCNC: 192 MG/DL (ref 200–360)
VIT B12 BLD-MCNC: 774 PG/ML (ref 211–946)
WBC # BLD AUTO: 5.81 10*3/MM3 (ref 3.4–10.8)

## 2020-09-05 PROCEDURE — 99232 SBSQ HOSP IP/OBS MODERATE 35: CPT | Performed by: INTERNAL MEDICINE

## 2020-09-05 PROCEDURE — 63710000001 INSULIN LISPRO (HUMAN) PER 5 UNITS: Performed by: INTERNAL MEDICINE

## 2020-09-05 PROCEDURE — 82728 ASSAY OF FERRITIN: CPT | Performed by: INTERNAL MEDICINE

## 2020-09-05 PROCEDURE — 82962 GLUCOSE BLOOD TEST: CPT

## 2020-09-05 PROCEDURE — 85025 COMPLETE CBC W/AUTO DIFF WBC: CPT | Performed by: NURSE PRACTITIONER

## 2020-09-05 PROCEDURE — 82607 VITAMIN B-12: CPT | Performed by: INTERNAL MEDICINE

## 2020-09-05 PROCEDURE — 36415 COLL VENOUS BLD VENIPUNCTURE: CPT | Performed by: NURSE PRACTITIONER

## 2020-09-05 PROCEDURE — 82746 ASSAY OF FOLIC ACID SERUM: CPT | Performed by: INTERNAL MEDICINE

## 2020-09-05 PROCEDURE — 85730 THROMBOPLASTIN TIME PARTIAL: CPT | Performed by: NURSE PRACTITIONER

## 2020-09-05 PROCEDURE — 84466 ASSAY OF TRANSFERRIN: CPT | Performed by: INTERNAL MEDICINE

## 2020-09-05 PROCEDURE — 25010000002 HEPARIN (PORCINE) PER 1000 UNITS: Performed by: NURSE PRACTITIONER

## 2020-09-05 PROCEDURE — 83540 ASSAY OF IRON: CPT | Performed by: INTERNAL MEDICINE

## 2020-09-05 RX ORDER — LANCETS 28 GAUGE
1 EACH MISCELLANEOUS EVERY MORNING
Qty: 100 EACH | Refills: 12 | Status: SHIPPED | OUTPATIENT
Start: 2020-09-05

## 2020-09-05 RX ORDER — CALCIUM CARB/VITAMIN D3/VIT K1 500-100-40
1 TABLET,CHEWABLE ORAL EVERY MORNING
Qty: 100 EACH | Refills: 3 | Status: SHIPPED | OUTPATIENT
Start: 2020-09-05 | End: 2020-09-05 | Stop reason: SDUPTHER

## 2020-09-05 RX ORDER — INSULIN GLARGINE 100 [IU]/ML
15 INJECTION, SOLUTION SUBCUTANEOUS NIGHTLY
Qty: 360 ML | Refills: 3 | Status: SHIPPED | OUTPATIENT
Start: 2020-09-05 | End: 2020-09-05 | Stop reason: HOSPADM

## 2020-09-05 RX ORDER — BLOOD-GLUCOSE METER
1 KIT MISCELLANEOUS EVERY MORNING
Qty: 1 EACH | Refills: 0 | Status: SHIPPED | OUTPATIENT
Start: 2020-09-05 | End: 2020-09-05 | Stop reason: SDUPTHER

## 2020-09-05 RX ORDER — ASPIRIN 81 MG/1
81 TABLET, CHEWABLE ORAL DAILY
Qty: 30 TABLET | Refills: 3 | Status: SHIPPED | OUTPATIENT
Start: 2020-09-06

## 2020-09-05 RX ORDER — ASPIRIN 81 MG/1
81 TABLET, CHEWABLE ORAL DAILY
Status: DISCONTINUED | OUTPATIENT
Start: 2020-09-05 | End: 2020-09-05 | Stop reason: HOSPADM

## 2020-09-05 RX ORDER — ATORVASTATIN CALCIUM 40 MG/1
40 TABLET, FILM COATED ORAL DAILY
Qty: 30 TABLET | Refills: 3 | Status: SHIPPED | OUTPATIENT
Start: 2020-09-05

## 2020-09-05 RX ORDER — TOLTERODINE TARTRATE 2 MG/1
2 TABLET, EXTENDED RELEASE ORAL 2 TIMES DAILY
Qty: 60 TABLET | Refills: 3 | Status: SHIPPED | OUTPATIENT
Start: 2020-09-05 | End: 2020-09-17

## 2020-09-05 RX ORDER — CALCIUM CARB/VITAMIN D3/VIT K1 500-100-40
1 TABLET,CHEWABLE ORAL EVERY MORNING
Qty: 100 EACH | Refills: 3 | Status: SHIPPED | OUTPATIENT
Start: 2020-09-05

## 2020-09-05 RX ORDER — INSULIN GLARGINE 100 [IU]/ML
15 INJECTION, SOLUTION SUBCUTANEOUS NIGHTLY
Status: DISCONTINUED | OUTPATIENT
Start: 2020-09-05 | End: 2020-09-05 | Stop reason: HOSPADM

## 2020-09-05 RX ORDER — PEN NEEDLE, DIABETIC 30 GX3/16"
1 NEEDLE, DISPOSABLE MISCELLANEOUS EVERY MORNING
Qty: 100 EACH | Refills: 3 | Status: SHIPPED | OUTPATIENT
Start: 2020-09-05

## 2020-09-05 RX ORDER — BLOOD SUGAR DIAGNOSTIC
30 STRIP MISCELLANEOUS DAILY
Qty: 30 EACH | Refills: 3 | Status: SHIPPED | OUTPATIENT
Start: 2020-09-05

## 2020-09-05 RX ORDER — PEN NEEDLE, DIABETIC 30 GX3/16"
1 NEEDLE, DISPOSABLE MISCELLANEOUS EVERY MORNING
Qty: 100 EACH | Refills: 3 | Status: SHIPPED | OUTPATIENT
Start: 2020-09-05 | End: 2020-09-05 | Stop reason: SDUPTHER

## 2020-09-05 RX ORDER — BLOOD-GLUCOSE METER
1 KIT MISCELLANEOUS EVERY MORNING
Qty: 1 EACH | Refills: 0 | Status: SHIPPED | OUTPATIENT
Start: 2020-09-05

## 2020-09-05 RX ORDER — LANCETS 28 GAUGE
1 EACH MISCELLANEOUS EVERY MORNING
Qty: 100 EACH | Refills: 12 | Status: SHIPPED | OUTPATIENT
Start: 2020-09-05 | End: 2020-09-05 | Stop reason: SDUPTHER

## 2020-09-05 RX ADMIN — INSULIN LISPRO 2 UNITS: 100 INJECTION, SOLUTION INTRAVENOUS; SUBCUTANEOUS at 12:17

## 2020-09-05 RX ADMIN — ACETAMINOPHEN 650 MG: 325 TABLET, FILM COATED ORAL at 13:00

## 2020-09-05 RX ADMIN — ASPIRIN 81 MG: 81 TABLET, COATED ORAL at 07:52

## 2020-09-05 RX ADMIN — ASPIRIN 81 MG: 81 TABLET, CHEWABLE ORAL at 15:12

## 2020-09-05 RX ADMIN — PANTOPRAZOLE SODIUM 40 MG: 40 TABLET, DELAYED RELEASE ORAL at 06:40

## 2020-09-05 RX ADMIN — OXYCODONE HYDROCHLORIDE AND ACETAMINOPHEN 1 TABLET: 5; 325 TABLET ORAL at 01:19

## 2020-09-05 RX ADMIN — OXYCODONE HYDROCHLORIDE AND ACETAMINOPHEN 1 TABLET: 5; 325 TABLET ORAL at 16:20

## 2020-09-05 RX ADMIN — INSULIN LISPRO 3 UNITS: 100 INJECTION, SOLUTION INTRAVENOUS; SUBCUTANEOUS at 07:51

## 2020-09-05 RX ADMIN — SODIUM CHLORIDE, PRESERVATIVE FREE 10 ML: 5 INJECTION INTRAVENOUS at 07:53

## 2020-09-05 RX ADMIN — HEPARIN SODIUM 13.3 UNITS/KG/HR: 10000 INJECTION, SOLUTION INTRAVENOUS at 02:42

## 2020-09-05 RX ADMIN — POLYETHYLENE GLYCOL 3350 17 G: 17 POWDER, FOR SOLUTION ORAL at 07:53

## 2020-09-05 RX ADMIN — MULTIPLE VITAMINS W/ MINERALS TAB 1 TABLET: TAB at 07:52

## 2020-09-05 RX ADMIN — INSULIN LISPRO 3 UNITS: 100 INJECTION, SOLUTION INTRAVENOUS; SUBCUTANEOUS at 17:05

## 2020-09-05 RX ADMIN — OXYCODONE HYDROCHLORIDE AND ACETAMINOPHEN 1 TABLET: 5; 325 TABLET ORAL at 07:51

## 2020-09-05 NOTE — PLAN OF CARE
Problem: Patient Care Overview  Goal: Plan of Care Review  Outcome: Ongoing (interventions implemented as appropriate)  Flowsheets (Taken 9/5/2020 0500)  Progress: no change  Plan of Care Reviewed With: patient  Outcome Summary: Pt A&Ox4, up to bedside commode, falls education given, reports pain to Lf lower abd, po and iv pain meds given, vitals as charted, heparin gtt continues, PTT therapeutic, bladder scans as ordered

## 2020-09-05 NOTE — PROGRESS NOTES
"    Patient Name: Narcisa Hammer  :1960  60 y.o.      Patient Care Team:  Provider, No Known as PCP - General    Chief Complaint: splenic infarct    Interval History:    some puffiness in the legs, otherwise no complaints. Lots of questions about AC    Objective   Vital Signs  Temp:  [97.5 °F (36.4 °C)-98.5 °F (36.9 °C)] 98.1 °F (36.7 °C)  Heart Rate:  [57-89] 64  Resp:  [16] 16  BP: (108-126)/(60-79) 116/75    Intake/Output Summary (Last 24 hours) at 2020 1406  Last data filed at 2020 0944  Gross per 24 hour   Intake 820 ml   Output 1620 ml   Net -800 ml     Flowsheet Rows      First Filed Value   Admission Height  172.7 cm (68\") Documented at 2020 2306   Admission Weight  97.5 kg (215 lb) Documented at 2020 230          Physical Exam:   General Appearance:    Alert, cooperative, in no acute distress   Lungs:     Clear to auscultation.  Normal respiratory effort and rate.      Heart:    Regular rhythm and normal rate, normal S1 and S2, no murmurs, gallops or rubs.     Chest Wall:    No abnormalities observed   Abdomen:     Soft, nontender, positive bowel sounds.     Extremities:   no cyanosis, clubbing or edema.  No marked joint deformities.  Adequate musculoskeletal strength.       Results Review:    Results from last 7 days   Lab Units 20  0615   SODIUM mmol/L 142   POTASSIUM mmol/L 3.9   CHLORIDE mmol/L 109*   CO2 mmol/L 23.8   BUN mg/dL 5*   CREATININE mg/dL 0.49*   GLUCOSE mg/dL 188*   CALCIUM mg/dL 8.4*     Results from last 7 days   Lab Units 20  0615 20  1522   TROPONIN T ng/mL <0.010 <0.010     Results from last 7 days   Lab Units 20  0549   WBC 10*3/mm3 5.81   HEMOGLOBIN g/dL 10.5*   HEMATOCRIT % 32.0*   PLATELETS 10*3/mm3 150     Results from last 7 days   Lab Units 20  0549 20  0615 20  0657  20  0236   INR   --  1.05  --   --  0.98   APTT seconds 87.8* 80.7* 82.2*   < > 26.2    < > = values in this interval not displayed.        "   Results from last 7 days   Lab Units 09/02/20  0832   CHOLESTEROL mg/dL 209*   TRIGLYCERIDES mg/dL 196*   HDL CHOL mg/dL 43   LDL CHOL mg/dL 127*               Medication Review:     apixaban 10 mg Oral Q12H   Followed by      [START ON 9/12/2020] apixaban 5 mg Oral Q12H   insulin lispro 0-7 Units Subcutaneous TID AC   multivitamin with minerals 1 tablet Oral Daily   pantoprazole 40 mg Oral Q AM   polyethylene glycol 17 g Oral Daily   sodium chloride 10 mL Intravenous Q12H             Assessment/Plan   1. Splenic infarct- place ZIO on discharge to r/o AF. Needs to f/u with Dr. WEINSTEIN in 3-4 weeks. Agree with Eliquis.   2. PAD: no claudication.   3. HLD: needs moderate intensity statin for DM diagnosis  4. DM    Ok for d/c from cardiac perspective. F/u with Dr. Ngo.     Hodan Frausto MD  [facility]  09/05/20  14:06

## 2020-09-05 NOTE — PROGRESS NOTES
REASON FOR CONSULTATION:     Provide an opinion on any further workup or treatment of splenic infarction                             INTERVAL HISTORY:   9/5/2020 patient continues being on heparin intravenous for anticoagulation.  Abdomen pain has been much better now.  She does complain of mild edema on her legs.     HISTORY OF PRESENT ILLNESS:  The patient is a 60 y.o. year old female who is a woman having type 2 diabetes, obstructive sleep apnea, lower extremity peripheral vascular disease who presented to the emergency room yesterday because of sudden onset left abdomen pain. She initially had some pains and was treated for UTI because of increased frequency and urgency of urination. About 2-3 days ago, over the weekend she was having severe left upper abdomen pain radiating into the left lower chest and groin area. The pain was exacerbated by inspiration and movement. The patient had nausea but no vomiting. She denies odynophagia or dysphagia and she had no constipation or diarrhea, no blood per rectum.     The patient presented to the emergency room, and laboratory study reported hemoglobin 14.4, platelets 202,000 and WBC 8350 including neutrophils 5460, lymphocytes 2000. Her chemistry lab reported lipase 11, creatinine 0.66, normal liver function panel except alkaline phosphatase 130, and sodium 134 but normal potassium 3.7 and calcium 10.0. Glucose was 267.  CT scan for the abdomen and pelvis reported evidence of spleen infarction within the central to lateral aspect of the spleen.      laboratory study today reported hemoglobin 11.6, platelets 170,000, WBC 6670 with neutrophils 4550.     The patient was admitted to the hospital for further management and she was started on heparin IV for anticoagulation.      At the time of interview, the patient reports she continues to have pain in the left upper quadrant, has not eased up since admission.      I reviewed laboratory study results, for hypercoagulable  workup. She was already tested negative for factor V Leiden mutation. Other laboratory studies obtained include lupus anticoagulant, anticardiolipin antibody, antiphospholipid panel 2, and antithrombin-III.           Medical History        Past Medical History:   Diagnosis Date   • Diabetes mellitus (CMS/HCC)     • UTI (urinary tract infection)           Surgical History   History reviewed. No pertinent surgical history.        MEDICATIONS see EMR       FAMILY HISTORY:  History reviewed. No pertinent family history.     REVIEW OF SYSTEMS:  Review of Systems   Constitutional: Positive for activity change (Secondary to abdominal pain). Negative for chills, fatigue, fever and unexpected weight change.   HENT: Negative for mouth sores and sore throat.    Eyes: Negative for photophobia and visual disturbance.   Respiratory: Negative for cough and shortness of breath.    Cardiovascular: Negative for chest pain.  Complains of bilateral leg swelling for the past couple of days.   Gastrointestinal: Resolution of abdominal pain, nausea and vomiting. Negative for blood in stool, constipation and diarrhea.   Endocrine: Negative for cold intolerance and heat intolerance.   Genitourinary: Negative for dysuria and hematuria.   Musculoskeletal: Negative for back pain and joint swelling.   Skin: Negative for color change and rash.   Allergic/Immunologic: Negative for environmental allergies and immunocompromised state.   Neurological: Negative for dizziness and headaches.   Hematological: Negative for adenopathy. Does not bruise/bleed easily.   Psychiatric/Behavioral: Negative for agitation and confusion.               Objective        Vitals:    09/04/20 1508 09/04/20 1931 09/04/20 2335 09/05/20 0724   BP: 119/79 121/71 108/60 126/76   BP Location: Right arm Right arm Right arm Left arm   Patient Position: Lying Sitting Lying Lying   Pulse: 58 89 57    Resp: 16 16 16 16   Temp:  98.5 °F (36.9 °C) 97.5 °F (36.4 °C) 97.6 °F (36.4 °C)    TempSrc:  Oral Oral Oral   SpO2:  96% 98%    Weight:       Height:                PHYSICAL EXAM:       CONSTITUTIONAL:  Vital signs reviewed.  Well-nourished well-developed female no distress, looks comfortable.  EYES:  Conjunctiva and lids unremarkable.    RESPIRATORY:  Normal respiratory effort.  Lungs clear to auscultation bilaterally.  CARDIOVASCULAR: Regular rhythm and rate.  Normal S1, S2.  No murmurs.  trace bilateral lower extremity edema.  GASTROINTESTINAL: Abdomen appears unremarkable.  Nontender.    Bowel sounds normal.   MUSCULOSKELETAL:  Unremarkable digits/nails.  No cyanosis or clubbing.  SKIN:  Warm.  No rashes.  PSYCHIATRIC:  Normal judgment and insight.  Normal mood and affect.        RECENT LABS:  Results from last 7 days   Lab Units 09/05/20  0549 09/04/20  0615 09/03/20  0657   WBC 10*3/mm3 5.81 5.10 6.90   HEMOGLOBIN g/dL 10.5* 10.7* 11.4*   HEMATOCRIT % 32.0* 32.7* 34.4   PLATELETS 10*3/mm3 150 156 188     Lab Results   Component Value Date    NEUTROABS 3.42 09/05/2020     Results from last 7 days   Lab Units 09/04/20  0615 09/03/20  0657 09/02/20  0832 08/31/20  2227   SODIUM mmol/L 142 137 137 134*   POTASSIUM mmol/L 3.9 3.8 4.0 3.7   CHLORIDE mmol/L 109* 104 103 97*   CO2 mmol/L 23.8 23.9 25.9 25.6   BUN mg/dL 5* 6* 5* 7*   CREATININE mg/dL 0.49* 0.62 0.51* 0.66   CALCIUM mg/dL 8.4* 9.4 8.6 10.0   BILIRUBIN mg/dL  --  0.4  --  0.4   ALK PHOS U/L  --  107  --  130*   ALT (SGPT) U/L  --  36*  --  28   AST (SGOT) U/L  --  31  --  17   GLUCOSE mg/dL 188* 251* 281* 267*     Lab Results   Component Value Date    PYYFCHII42 774 09/05/2020     Lab Results   Component Value Date    FOLATE 16.60 09/05/2020     Lab Results   Component Value Date    IRON 47 09/05/2020    TIBC 286 (L) 09/05/2020    FERRITIN 280.00 (H) 09/05/2020   Iron saturation 16%.        Component      Latest Ref Rng & Units 9/1/2020 9/3/2020   Dilute Prothrombin Time(dPT)      0.0 - 55.0 sec 37.7    dPT Confirm Ratio      0.00 -  1.40 Ratio 0.87    Thrombin Time      0.0 - 23.0 sec 19.5    PTT LA      0.0 - 51.9 sec 37.1    Dilute Viper Venom Time      0.0 - 47.0 sec 37.0    Lupus Anticoagulant Reflex       Comment:    Beta-2 Glyco 1 IgG      0 - 20 GPI IgG units  <9   Beta-2 Glyco 1 IgA      0 - 25 GPI IgA units  <9   Beta-2 Glyco 1 IgM      0 - 32 GPI IgM units  <9   Anticardiolipin IgG      0 - 14 GPL U/mL <9    Anticardiolipin IgM      0 - 12 MPL U/mL <9    ANTITHROMBIN ACTIVITY      90 - 134 % 75 (L)    Factor V Leiden      Normal Normal    Factor II, DNA Analysis      Normal  Normal   Protein C Activity      86 - 163 %  125   Protein S Functional      70 - 127 %  77   Protein S Ag, Free      49.0 - 138.0 %  >150.0 (H)          Assessment/Plan      ASSESSMENT: Sudden onset acute spleen infarction. This seems unprovoked. This patient had history of peripheral vascular disease, and she was seen by vascular surgeon already today, had angiogram study done with lab results pending.      I discussed with the patient, I agree with current use of intravenous heparin for anticoagulation. When she improves with improvement of symptoms, she could be switched to oral agent such as NOAC, with Eliquis, Xarelto or Pradaxa, one of them.      I discussed with the patient to obtain more laboratory results including factor 2 mutation, antiphosphatidylserine antibody and antibeta-2 glycoprotein 1 antibodies, and also protein C and protein S profile. I explained to the patient that laboratory results currently will not be a matter of her anticoagulation, she needs to be on treatment. However it will be important in determining how long she needs to be anticoagulated. We counseled her and discussed this but anyway she needs to be anticoagulated at least for 6-12 months.      On 9/5/2020, patient has much improved the pain in her left abdomen.  No nausea vomiting.  She does have trace edema in bilateral legs which is probably due to IV hydration and lack of  activity.  No calf tenderness.    Laboratory study reported a slightly decreased Antithrombin III activity 75%, otherwise unremarkable except a still pending anti-phosphatidylserine antibodies and antiphospholipid panel #2.    I discussed with the patient for switch anticoagulation to Eliquis.  I think she needs a 6-month treatment.  Her Antithrombin III level needs to be repeated in the future.     PLAN:  1.  Start loading dose Eliquis 10 mg every 12 hours for the first 7 days, then switch to 5 mg every 12 hours for 6 months.     2.  Discontinue IV heparin bladder 2 hours after oral Eliquis..  3.  From hematology point of view, patient will be able to discharge later today.  4. We will arrange patient come back to see us in clinic on 9/18/2020 for reevaluation.     Discussed with patient at bedside.  Also spoke with nursing staff.    We will sign off.        CHAITANYA DOUGHERTY M.D., Ph.D.    9/5/2020

## 2020-09-05 NOTE — PLAN OF CARE
Problem: Patient Care Overview  Goal: Plan of Care Review  Outcome: Ongoing (interventions implemented as appropriate)  Flowsheets (Taken 9/5/2020 3732)  Progress: improving  Plan of Care Reviewed With: patient  Outcome Summary: VSS. Telemetry monitor, SR. Alert and oriented x4, up ad joann. Room air. To be discharged home, awaiting ride. Will proceed with discharge.

## 2020-09-05 NOTE — DISCHARGE SUMMARY
Patient Name: Narcisa Hammer  : 1960  MRN: [id number]    Date of Admission: 2020  Date of Discharge:  2020  Primary Care Physician: Provider, No Known      Discharge Diagnoses     Active Hospital Problems    Diagnosis  POA   • **Splenic infarction [D73.5]  Yes   • Pulmonary nodule, left [R91.1]  No   • Hyperlipidemia [E78.5]  Yes   • Type 2 diabetes mellitus, without long-term current use of insulin (CMS/[facility]) [E11.9]  Yes   • PAD (peripheral artery disease) (CMS/[facility]) [I73.9]  Yes   • CHRISTIANA (obstructive sleep apnea) [G47.33]  Yes      Resolved Hospital Problems   No resolved problems to display.        Hospital Course     Brief admission history and physical.  Please refer to the H&P for full details.  A very pleasant 60 years old white female with a past history of obstructive sleep apnea/type 2 diabetes/lower extremity peripheral vascular disease.  Presents to the emergency department complaining of 3 days history of upper left-sided abdominal pain radiating to the left loin and left upper back  pleuritic in nature.  Positive nausea no vomiting no dysphagia no odynophagia no diarrhea no constipation no bleeding per rectum positive chills but no fever no chest pain shortness of breath cough wheeze or hemoptysis.  Her physical examination revealed an afebrile patient with stable vital signs/moderate pain and discomfort/mild tenderness in the left upper abdomen otherwise negative.  Hospital course.  In the emergency department work-up included CMP revealing a glucose of 267 sodium 134 chloride 97 alkaline phosphatase 130 otherwise negative..  Lipase was normal.  CBC was normal.  Urine analysis was unremarkable.  PT/INR PTT were normal.  D-dimer was negative.  CT abdomen and pelvis revealed normal sinus rhythm nonspecific T wave abnormalities in the inferior leads.  Patient was admitted with a diagnosis of a splenic infarction.  Hypercoagulable status work-up was initiated.  A lower extremity venous  ultrasound revealed no DVT.  CTA of the chest was done and revealed no PE.  Vascular surgery was obtained to rule out any aortic source CTA of the abdomen and pelvis revealed no source of an embolus.  Surgery opinion was against any surgical intervention.  Hematology consult was obtained and agreed with a hypercoagulable status work-up.  She was empirically started on heparin.  She was started on clear liquids.she was started on pain control.  Her abdominal pain gradually improved.  Her diet was advanced.  She tolerated the advancement of the diet well.  She was also started on aspirin and Protonix to prevent any possibility of GI bleed.  A cardiology consult was obtained to rule out cardiac cause of the thrombus EKG was negative for A. fib.  2D echo revealed no significant valvular disease and because of thrombus.  SHERYL revealed a normal ejection fraction left ventricular hypertrophy normal diastolic dysfunction no foramen ovale no thrombus no vegetation mild to moderate non-mobile plaques in the aortic arch.  ZIO patch was placed on the patient prior to discharge to evaluate for intermittent atrial fibrillation.  Hypercoagulable status was negative but some tests are pending.  The hematology recommendation was to switch to p.o. Eliquis and continue aspirin and follow-up.  The patient however has refused Eliquis.  At the time of discharge I spent approximately 15 minutes just talking about the pros and the cons of full anticoagulation with her she continues to refuse anticoagulation understanding the risk of recurrent thrombosis including CVA.  She is to discuss anticoagulation is her primary care physician.  As far as her diabetes this was uncontrolled her A1c was 11.1 she was placed initially on sliding scale at diabetic indicating consult was obtained and at the time of discharge she was placed on 15 units of Lantus and to follow-up with her family physician for further titration.  She has frequency and urgency  of urination her urine analysis was negative bladder scan were negative this was diagnosed mostly as an overactive bladder and she was given did trolled at the time of discharge.  Her lipid profile was suboptimal and she was given Lipitor at the time of discharge this needs to be followed up with the primary MD.  There was an incidental nodule of the left lung by CAT scan measuring 6 mm this needs to be followed up as an outpatient with a repeat CT in 6-month with a primary care physician.  She developed anemia and her hemoglobin remained stable around the 10 anemia work-up was suggestive of anemia of chronic disease.  CBC should be repeated with the primary care physician.    Consultants     Consult Orders (all) (From admission, onward)     Start     Ordered    09/03/20 1458  Inpatient Diabetes Educator Consult  Once     Provider:  (Not yet assigned)    09/03/20 1459    09/02/20 1011  Inpatient Cardiology Consult  Once     Specialty:  Cardiology  Provider:  Sonali Ngo MD    09/02/20 1011    09/01/20 1626  Inpatient Vascular Surgery Consult  Once     Specialty:  Vascular Surgery  Provider:  Matt Trivedi Jr., MD    09/01/20 1626    09/01/20 1615  Hematology & Oncology Inpatient Consult  Once     Specialty:  Hematology and Oncology  Provider:  Keisha Langston MD PhD    09/01/20 1616    09/01/20 0215  Inpatient General Surgery Consult  Once     Specialty:  General Surgery  Provider:  Monica Rdz MD    09/01/20 0216    09/01/20 0110  [facility] (on-call MD unless specified) Details  Once     Specialty:  Hospitalist  Provider:  (Not yet assigned)    09/01/20 0109    09/01/20 0103  Surgery (on-call MD unless specified)  Once     Specialty:  General Surgery  Provider:  (Not yet assigned)    09/01/20 0102              Procedures     Imaging Results (All)     Procedure Component Value Units Date/Time    CT Angiogram Chest With & Without Contrast [[id number]] Collected:  09/02/20 1447     Updated:  09/02/20  1729    Narrative:       CT ANGIOGRAM OF THE CHEST. MULTIPLE CORONAL, SAGITTAL, AND 3D  RECONSTRUCTIONS     HISTORY: Evaluate for proximal source of splenic artery thromboembolism.     TECHNIQUE: Radiation dose reduction techniques were utilized, including  automated exposure control and exposure modulation based on body size.   CT angiogram of the chest was performed. Multiple coronal, sagittal, and  3-D reconstruction images were obtained.      COMPARISON:CT AP 09/01/2020.     FINDINGS:      CHEST:  There is no hilar, mediastinal or axillary adenopathy by size criteria.  Heart is normal in size. There is no significant pericardial effusion.     There is no pulmonary consolidation, pleural effusion or pneumothorax.  Sub-6 mm pulmonary nodule within the lingula.     No significant atherosclerotic disease is visualized within the thoracic  aorta. There are a few tiny foci of calcification along the aortic arch.  While no discrete dissection flap is visualized, please note evaluation  is suboptimal due to the lack of cardiac gating performed.     ABDOMEN AND PELVIS:  Hepatic steatosis is present. The appendix is unremarkable.     There are no findings of small bowel obstruction.     While this exam is not tailored for detailed evaluation of the abdominal  viscera due to contrast timing, no gross abnormality is visualized  within the gallbladder, pancreas, adrenal glands or kidneys. There is no  hydronephrosis.     Wedge-shaped area of hypoenhancement with associated volume loss  projects through the lateral aspect of the spleen peripherally, as seen  on recent CT.     There is no abdominopelvic adenopathy by size criteria. There is no free  intraperitoneal air or fluid.     The origins of the celiac, superior mesenteric and bilateral renal  arteries are widely patent. Minimal atherosclerotic calcification is  present within the right common iliac artery and distal aorta without  significant stenosis.     BONE WINDOWS:  There are no suspicious lytic or blastic osseous lesions.       Impression:       1.  Minimal atherosclerotic calcification is present within the aortic  arch, distal aorta and right common iliac artery without significant  stenosis.  2.  Findings of evolving splenic infarct, as before.  3.  Hepatic steatosis.  4.  Sub-6 mm pulmonary nodule within the lingula. If this patient is at  increased risk for lung cancer, follow-up chest CT in 12 months can be  considered to ensure stability. If this patient is not at increased risk  for lung cancer, no further follow-up is necessary per Fleischner  criteria.  5.  Other findings as above.     This report was finalized on 9/2/2020 5:26 PM by Dr. Richar Tillman M.D.       CT Angiogram Abdomen Pelvis [[id number]] Collected:  09/02/20 1447     Updated:  09/02/20 1729    Narrative:       CT ANGIOGRAM OF THE CHEST. MULTIPLE CORONAL, SAGITTAL, AND 3D  RECONSTRUCTIONS     HISTORY: Evaluate for proximal source of splenic artery thromboembolism.     TECHNIQUE: Radiation dose reduction techniques were utilized, including  automated exposure control and exposure modulation based on body size.   CT angiogram of the chest was performed. Multiple coronal, sagittal, and  3-D reconstruction images were obtained.      COMPARISON:CT AP 09/01/2020.     FINDINGS:      CHEST:  There is no hilar, mediastinal or axillary adenopathy by size criteria.  Heart is normal in size. There is no significant pericardial effusion.     There is no pulmonary consolidation, pleural effusion or pneumothorax.  Sub-6 mm pulmonary nodule within the lingula.     No significant atherosclerotic disease is visualized within the thoracic  aorta. There are a few tiny foci of calcification along the aortic arch.  While no discrete dissection flap is visualized, please note evaluation  is suboptimal due to the lack of cardiac gating performed.     ABDOMEN AND PELVIS:  Hepatic steatosis is present. The appendix is  unremarkable.     There are no findings of small bowel obstruction.     While this exam is not tailored for detailed evaluation of the abdominal  viscera due to contrast timing, no gross abnormality is visualized  within the gallbladder, pancreas, adrenal glands or kidneys. There is no  hydronephrosis.     Wedge-shaped area of hypoenhancement with associated volume loss  projects through the lateral aspect of the spleen peripherally, as seen  on recent CT.     There is no abdominopelvic adenopathy by size criteria. There is no free  intraperitoneal air or fluid.     The origins of the celiac, superior mesenteric and bilateral renal  arteries are widely patent. Minimal atherosclerotic calcification is  present within the right common iliac artery and distal aorta without  significant stenosis.     BONE WINDOWS: There are no suspicious lytic or blastic osseous lesions.       Impression:       1.  Minimal atherosclerotic calcification is present within the aortic  arch, distal aorta and right common iliac artery without significant  stenosis.  2.  Findings of evolving splenic infarct, as before.  3.  Hepatic steatosis.  4.  Sub-6 mm pulmonary nodule within the lingula. If this patient is at  increased risk for lung cancer, follow-up chest CT in 12 months can be  considered to ensure stability. If this patient is not at increased risk  for lung cancer, no further follow-up is necessary per Fleischner  criteria.  5.  Other findings as above.     This report was finalized on 9/2/2020 5:26 PM by Dr. Richar Tillman M.D.       CT Abdomen Pelvis With Contrast [[id number]] Collected:  09/01/20 0701     Updated:  09/01/20 2113    Narrative:       CT ABDOMEN PELVIS WITH IV CONTRAST     HISTORY: Left flank pain with onset Saturday. Left upper quadrant pain.     TECHNIQUE: CT abdomen and pelvis with intravenous and without oral  contrast.     COMPARISON: None.     FINDINGS: Lung bases appear clear and there is no basilar or  pleural  effusion. There is diffuse fat infiltration of the liver. Gallbladder,  adrenal glands, pancreas, kidneys appear within normal limits. There is  no hydronephrosis.     Within the central to lateral aspect of the spleen, there is an area of  linear hypodensity with subtle volume loss at its periphery and this is  consistent with splenic infarction. There is no perisplenic fluid or  hemorrhage to suggest that this represents a laceration.     There is no bowel dilatation or evidence for bowel obstruction. Normal  appendix is visualized. Urinary bladder is mostly decompressed.       Impression:       1. Linear low-density within the spleen is consistent with a splenic  infarction. No perisplenic fluid or other findings to suggest  laceration.  2. Diffuse fat infiltration of the liver.     Findings discussed with YULI Wyatt in the emergency room on  09/01/2020 at 12:26 AM.     Radiation dose reduction techniques were utilized, including automated  exposure control and exposure modulation based on body size.     This report was finalized on 9/1/2020 9:10 PM by Dr. Santiago Vega M.D.             Pertinent Labs     Results from last 7 days   Lab Units 09/05/20  0549 09/04/20  0615 09/03/20  0657 09/02/20  0832   WBC 10*3/mm3 5.81 5.10 6.90 6.67   HEMOGLOBIN g/dL 10.5* 10.7* 11.4* 11.6*   PLATELETS 10*3/mm3 150 156 188 170     Results from last 7 days   Lab Units 09/04/20  0615 09/03/20  0657 09/02/20  0832 08/31/20  2227   SODIUM mmol/L 142 137 137 134*   POTASSIUM mmol/L 3.9 3.8 4.0 3.7   CHLORIDE mmol/L 109* 104 103 97*   CO2 mmol/L 23.8 23.9 25.9 25.6   BUN mg/dL 5* 6* 5* 7*   CREATININE mg/dL 0.49* 0.62 0.51* 0.66   GLUCOSE mg/dL 188* 251* 281* 267*   Estimated Creatinine Clearance: 145.1 mL/min (A) (by C-G formula based on SCr of 0.49 mg/dL (L)).  Results from last 7 days   Lab Units 09/03/20  0657 08/31/20  2227   ALBUMIN g/dL 3.90 4.30   BILIRUBIN mg/dL 0.4 0.4   ALK PHOS U/L 107 130*   AST  (SGOT) U/L 31 17   ALT (SGPT) U/L 36* 28     Results from last 7 days   Lab Units 09/04/20  0615 09/03/20  0657 09/02/20  0832 08/31/20  2227   CALCIUM mg/dL 8.4* 9.4 8.6 10.0   ALBUMIN g/dL  --  3.90  --  4.30     Results from last 7 days   Lab Units 08/31/20  2227   LIPASE U/L 11*     Results from last 7 days   Lab Units 09/04/20  0615 09/03/20  1522 09/01/20  0236   TROPONIN T ng/mL <0.010 <0.010  --    D DIMER QUANT MCGFEU/mL  --   --  <0.27       Results from last 7 days   Lab Units 09/02/20  0832   CHOLESTEROL mg/dL 209*   TRIGLYCERIDES mg/dL 196*   HDL CHOL mg/dL 43   LDL CHOL mg/dL 127*         Imaging Results (Last 24 Hours)     ** No results found for the last 24 hours. **          Test Results Pending at Discharge      Order Current Status    Antiphosphotidyl Antibodies Panel II In process    Phosphatidylserine Antibodies In process            Discharge Exam   Physical Exam  Vitals.  Temperature 98.1 a pulse of 64 respiratory rate of 16 and blood pressure 116/75 and O2 sats of 95% on room air.  General.  Middle-aged female alert oriented x3.  No pain diaphoresis or respiratory distress.  Oral cavity.  Moist mucous membrane no throat lesions or exudates  Neck.  Supple no JVD no lymphadenopathy no thyromegaly  Cardiovascular.  Regular rate and rhythm bradycardia grade 2 systolic murmur  Chest.  Clear to auscultation bilaterally with no added sounds  Abdomen.  Soft lax no tenderness no organomegaly no guarding or rebound  Extremities.  No clubbing cyanosis or edema  CNS.  No acute focal neurological deficits      Discharge Details        Discharge Medications      New Medications      Instructions Start Date   Alcohol Pads 70 % pads   30 pads, Does not apply, Daily      aspirin 81 MG chewable tablet   81 mg, Oral, Daily   Start Date:  September 6, 2020     atorvastatin 40 MG tablet  Commonly known as:  Lipitor   40 mg, Oral, Daily      freestyle lancets   1 each, Other, Every Morning, Use as instructed     "  FreeStyle Lite device   1 Device, Does not apply, Every Morning      glucose blood test strip  Commonly known as:  FREESTYLE LITE   1 each, Other, Every Morning, Use as instructed      insulin detemir 100 UNIT/ML injection  Commonly known as:  Levemir   15 Units, Subcutaneous, Daily      Insulin Syringe 31G X 5/16\" 0.3 ML misc   1 each, Subcutaneous, Every Morning      Pen Needles 32G X 4 MM misc   1 each, Subcutaneous, Every Morning      tolterodine 2 MG tablet  Commonly known as:  Detrol   2 mg, Oral, 2 Times Daily         Continue These Medications      Instructions Start Date   MULTIVITAMIN ADULT PO   1 tablet, Oral      NON FORMULARY   1 tablet, Oral, 3 Times Daily, Melavic supplement for DM          Stop These Medications    cephalexin 250 MG capsule  Commonly known as:  KEFLEX     nitrofurantoin (macrocrystal-monohydrate) 100 MG capsule  Commonly known as:  MACROBID     Pyridium 100 MG tablet  Generic drug:  phenazopyridine            Allergies   Allergen Reactions   • Dilaudid [Hydromorphone Hcl] Nausea And Vomiting   • Doxycycline Hives   • Penicillins Unknown - Low Severity   • Sulfa Antibiotics Hives         Discharge Disposition:  Condition: Stable    Diet:   Diet Order   Procedures   • Diet Regular       Activity:   Activity Instructions     Activity as Tolerated            Counseling : Full counseling about pros and cons of anticoagulation was performed.    CODE STATUS:    Code Status and Medical Interventions:   Ordered at: 09/01/20 0216     Code Status:    CPR     Medical Interventions (Level of Support Prior to Arrest):    Full       Future Appointments   Date Time Provider Department Center   10/2/2020  1:10 PM LAB CHAIR 5 JANIS [facility]  [facility]   10/2/2020  1:40 PM Keisha Langston MD PhD [facility]     Additional Instructions for the Follow-ups that You Need to Schedule     Discharge Follow-up with PCP   As directed       Currently Documented PCP:    Provider, No Known    PCP " Phone Number:    [phone]     Follow Up Details:  Primary MD in 1 week for splenic infarction/hypercoagulable status/type 2 diabetes that is uncontrolled         Discharge Follow-up with Specified Provider: Cardiology; 2 Weeks   As directed      To:  Cardiology    Follow Up:  2 Weeks    Follow Up Details:  Splenic infarction/ZIO interpretation         Discharge Follow-up with Specified Provider: Hematology oncology; 2 Weeks   As directed      To:  Hematology oncology    Follow Up:  2 Weeks    Follow Up Details:  Splenic infarction/hypercoagulable status           Follow-up Information     Nelson Villeda MD Follow up in 3 month(s).    Specialty:  Vascular Surgery  Why:  With ABIs.  Contact information:  [address] [facility] [address]  [place] [address]  [phone]             Conner Bello MD Follow up in 3 day(s).    Specialty:  Vascular Surgery  Why:  In the [facility] vein center with left leg class I vein mapping.  Contact information:  [address] [facility] [address]  [place] [address]  [phone]             Provider, No Known .    Why:  Primary MD in 1 week for splenic infarction/hypercoagulable status/type 2 diabetes that is uncontrolled  Contact information:  [facility] [address]  [phone]             Hodan Frausto MD. Schedule an appointment as soon as possible for a visit in 2 week(s).    Specialty:  Cardiology  Contact information:  [address] [facility]  [address]  [place] [id number] [phone]             Keisha Langston MD PhD. Schedule an appointment as soon as possible for a visit in 2 week(s).    Specialties:  Hematology and Oncology, Hematology, Oncology  Contact information:  [address] [facility] [address]  [place] [address]  [phone]                     Time Spent on Discharge:  Greater than 30 minutes      Michelle Gonzales MD  [facility]  09/05/20  4:52 PM

## 2020-09-05 NOTE — NURSING NOTE
Patient refusing eliquis, and only wants to be on a baby aspirin. Dr. Langston notified, orders to discontinue eliquis, stop the heparin drip, and give 81 mg of aspirin. Will proceed.

## 2020-09-06 ENCOUNTER — READMISSION MANAGEMENT (OUTPATIENT)
Dept: CALL CENTER | Facility: HOSPITAL | Age: 60
End: 2020-09-06

## 2020-09-06 NOTE — OUTREACH NOTE
Prep Survey      Responses   [facility] facility patient discharged from?  [place]   Is LACE score < 7 ?  No   Eligibility  Readm Mgmt   Discharge diagnosis  Splenic infarction    COVID-19 Test Status  Negative   Does the patient have one of the following disease processes/diagnoses(primary or secondary)?  Other   Does the patient have Home health ordered?  No   Is there a DME ordered?  No   Prep survey completed?  Yes          Sushma Campa RN

## 2020-09-06 NOTE — PROGRESS NOTES
Case Management Discharge Note      Final Note: Pt discharged home on 9/5.   LAURI Coombs RN         Destination      No service has been selected for the patient.      Durable Medical Equipment      No service has been selected for the patient.      Dialysis/Infusion      No service has been selected for the patient.      Home Medical Care      No service has been selected for the patient.      Therapy      No service has been selected for the patient.      Community Resources      No service has been selected for the patient.        Transportation Services  Private: Car    Final Discharge Disposition Code: 01 - home or self-care

## 2020-09-08 ENCOUNTER — NURSE TRIAGE (OUTPATIENT)
Dept: CALL CENTER | Facility: HOSPITAL | Age: 60
End: 2020-09-08

## 2020-09-08 ENCOUNTER — TELEPHONE (OUTPATIENT)
Dept: CARDIOLOGY | Facility: CLINIC | Age: 60
End: 2020-09-08

## 2020-09-08 ENCOUNTER — TELEPHONE (OUTPATIENT)
Dept: ONCOLOGY | Facility: CLINIC | Age: 60
End: 2020-09-08

## 2020-09-08 ENCOUNTER — READMISSION MANAGEMENT (OUTPATIENT)
Dept: CALL CENTER | Facility: HOSPITAL | Age: 60
End: 2020-09-08

## 2020-09-08 LAB
PS IGA SER-ACNC: 2 APS IGA (ref 0–20)
PS IGG SER-ACNC: 2 GPS IGG (ref 0–11)
PS IGM SER-ACNC: 4 MPS IGM (ref 0–25)

## 2020-09-08 NOTE — TELEPHONE ENCOUNTER
[phone]  11:43 am    Lexi,  at [facility] called re: order that was never fulfilled while pt was inpt.  She wonders if the order for the zio patch is still needed.  Please advise.    C RMCAPRICE

## 2020-09-08 NOTE — OUTREACH NOTE
Medical Week 1 Survey      Responses   [facility]   COVID-19 Test Status  Negative   Does the patient have one of the following disease processes/diagnoses(primary or secondary)?  Other   Is there a successful TCM telephone encounter documented?  No   Week 1 attempt successful?  Yes   Call start time  1456   Call end time  1504   Discharge diagnosis  Splenic infarction    Meds reviewed with patient/caregiver?  Yes   Is the patient having any side effects they believe may be caused by any medication additions or changes?  No   Does the patient have all medications ordered at discharge?  Yes   Prescription comments  Pt is upset that she was sent home with no pain medication. Patient rep is working on getting pain meds.   Is the patient taking all medications as directed (includes completed medication regime)?  Yes   Comments regarding appointments  Appt with cardiology and hematology on 9/17/20 agn 9/18/20   Does the patient have a primary care provider?   Yes   Does the patient have an appointment with their PCP within 7 days of discharge?  No   Comments regarding PCP  Pt has spoken with her PCP Dr. Jean Baptiste in [facility]   What is preventing the patient from scheduling follow up appointments within 7 days of discharge?  -- [PCP is Dr. Jean Baptiste in [facility]]   Nursing Interventions  Advised patient to make appointment   Has the patient kept scheduled appointments due by today?  N/A   Comments  Cardiology ordered patient to wear a patch for 2 weeks however it was overlooked during discharge and she did not receive. Someone (she doesn't know who but it's a patient rep) is working on that.    Pulse Ox monitoring  None   Psychosocial issues?  No   Did the patient receive a copy of their discharge instructions?  Yes   Nursing interventions  Reviewed instructions with patient   What is the patient's perception of their health status since discharge?  Worsening [Pt reports she's still in  pain]   Is the patient/caregiver able to teach back signs and symptoms related to disease process for when to call PCP?  Yes   Is the patient/caregiver able to teach back signs and symptoms related to disease process for when to call 911?  Yes   Is the patient/caregiver able to teach back the hierarchy of who to call/visit for symptoms/problems? PCP, Specialist, Home health nurse, Urgent Care, ED, 911  Yes   If the patient is a current smoker, are they able to teach back resources for cessation?  -- [Nonsmoker]   Week 1 call completed?  Yes   Wrap up additional comments  Pt did not have a good experience with the discharge process and orders.          Stephanie Angel, RN

## 2020-09-08 NOTE — TELEPHONE ENCOUNTER
----- Message from Keisha Langston MD PhD sent at 9/5/2020  1:46 PM EDT -----  Regarding: Follow-up appointment  Please change her appointment to 9/18/2020, check a CBC.    Also on the 18th the first patient I have Ms. Gifford needs to be 2 units, because she needs to be admitted to hospital that day and can take a lot of time to do that.     Thanks!    Kian

## 2020-09-08 NOTE — TELEPHONE ENCOUNTER
Caller was DC;d from [facility] after a splenic infarct.  She states that she is in mod to severe pain of 8.  She is unable to sleep and has been taking tylenol without relief.  She states that the hospitalist assured her he would not let her be in pain at home,   At discharge he told her to try tylenol and if it didn't work to call her PCP.  [place] and her PCP is willing to write a script but told her it would be a problem as she is in [place] for several more weeks.  Her PCP asked her to try and see if the hospitalist would be willing to give her something since she is in [place].  Patient was also supposed to have a Zio patch at discharge and did not get it.  Routed to case management.  Patient told that it is possible that the she will have to come to the ER to receive pain med prescription as she has been discharged.    Reason for Disposition  • [1] Caller has NON-URGENT medication question about med that PCP prescribed AND [2] triager unable to answer question    Additional Information  • Negative: Drug overdose and triager unable to answer question  • Negative: Caller requesting information unrelated to medicine  • Negative: Caller requesting a prescription for Strep throat and has a positive culture result  • Negative: Rash while taking a medication or within 3 days of stopping it  • Negative: Immunization reaction suspected  • Negative: [1] Asthma and [2] having symptoms of asthma (cough, wheezing, etc.)  • Negative: [1] Influenza symptoms AND [2] anti-viral med prescription request, such as Tamiflu  • Negative: [1] Symptom of illness (e.g., headache, abdominal pain, earache, vomiting) AND [2] more than mild  • Negative: MORE THAN A DOUBLE DOSE of a prescription or over-the-counter (OTC) drug  • Negative: [1] DOUBLE DOSE (an extra dose or lesser amount) of over-the-counter (OTC) drug AND [2] any symptoms (e.g., dizziness, nausea, pain, sleepiness)  • Negative: [1] DOUBLE DOSE (an extra dose or lesser  "amount) of prescription drug AND [2] any symptoms (e.g., dizziness, nausea, pain, sleepiness)  • Negative: Took another person's prescription drug  • Negative: [1] DOUBLE DOSE (an extra dose or lesser amount) of prescription drug AND [2] NO symptoms (Exception: a double dose of antibiotics)  • Negative: Diabetes drug error or overdose (e.g., took wrong type of insulin or took extra dose)  • Negative: [1] Request for URGENT new prescription or refill of \"essential\" medication (i.e., likelihood of harm to patient if not taken) AND [2] triager unable to fill per unit policy  • Negative: [1] Prescription not at pharmacy AND [2] was prescribed by PCP recently  • Negative: [1] Pharmacy calling with prescription questions AND [2] triager unable to answer question  • Negative: [1] Caller has URGENT medication question about med that PCP or specialist prescribed AND [2] triager unable to answer question    Answer Assessment - Initial Assessment Questions  1.   NAME of MEDICATION: \"What medicine are you calling about?\"      Pain med  2.   QUESTION: \"What is your question?\"      I wasn't sent home with any  3.   PRESCRIBING HCP: \"Who prescribed it?\" Reason: if prescribed by specialist, call should be referred to that group.      hospitalist  4. SYMPTOMS: \"Do you have any symptoms?\"      yes  5. SEVERITY: If symptoms are present, ask \"Are they mild, moderate or severe?\"      Mod to severe  6.  PREGNANCY:  \"Is there any chance that you are pregnant?\" \"When was your last menstrual period?\"      no    Protocols used: MEDICATION QUESTION CALL-ADULT-      "

## 2020-09-08 NOTE — OUTREACH NOTE
Case Management Call Center Follow-up      Responses   [facility] Call Center Tracking Reason?  Other (specify in comments)   Other Tracking Comments  Discharge concerns   Has the Call Center Case Management Follow-up issue been resolved?  No   Follow-up Comments  Notes reviewed.  Call placed to pt to discuss.  Pt requests contact information for Patient Experience due to concerns about her discharging MD.  She states that she has been able at this time to convince her home PCP to call in a prescription for pain medication.  She is concerned that her discharging MD would not prescribe the medication.  Call placed to pt's pharmacy to verify and they have spoken with pt's PCP Dr. Nobles and he has provided the prescription.  There still needs to be written a new prescription for test strips which includes the ICD-10 code.  Per the pharmacy staff, it must be Escripted and contain the code, it cannot be faxed or called in.  [facility] Coordinator contacted Dr. Schmidt who sent the appropriate prescription.   Also the ZIO patch prescribed at discharge was not placed as ordered.  Call placed to pt's cardiologist, Dr. Frausto and spoke with her MA Alba.  She has now sent a message to Dr. Frausto for direction.  Await call back.  Assured pt that this CCP would be in contact once Dr. Frausto has advised for next steps.          Lexi Arceo RN    9/8/2020, 13:28

## 2020-09-09 DIAGNOSIS — T82.868A: Primary | ICD-10-CM

## 2020-09-09 NOTE — TELEPHONE ENCOUNTER
Please place orders to have placed here in our office. Pt has an apt with  on 09/18/20. Would that be enough time to get her readings back.    Once orders are placed can we please set this patient sometime this week to have her ZIO placed.    She can be reached at [phone]    Thanks

## 2020-09-09 NOTE — OUTREACH NOTE
Case Management Call Center Follow-up      Responses   [facility] Call Center Tracking Reason?  Other (specify in comments)   Other Tracking Comments  Discharge concerns   Has the Call Center Case Management Follow-up issue been resolved?  Yes   Follow-up Comments  Received a call back from Dr. Frausto's MEGHNA Osorio.  She states that Dr. Frausto will provide the order for placement and that pt may come to their office for placement.  Jo will contact their holter monitor staff as well as their  so that they will contact pt with the appointment to come in for Zio patch placement.  Call then placed to pt to alert her to expect the call.  She has the address and phone number for Dr. Frausto's office as well.             Lexi Arceo RN    9/9/2020, 13:20

## 2020-09-09 NOTE — TELEPHONE ENCOUNTER
9/9 -- Called patient to schedule appt and was unable to LVM.    Will try again    Thank you  Nneka LOPEZ

## 2020-09-09 NOTE — OUTREACH NOTE
Case Management Call Center Follow-up      Responses   [facility] Call Center Tracking Reason?  Other (specify in comments)   Other Tracking Comments  Discharge concerns   Has the Call Center Case Management Follow-up issue been resolved?  No   Follow-up Comments  Conversation between Dr. Frausto's MA and Dr. Frausto noted.  Pt does need to have the ZIO patch placed.  Awaiting the order now from Dr. Frausto and the location of placement, either office or hospital.  Call placed to pt to let her know that the issue is being addressed and CCP will continue to follow until orders obtained and instructions provided to Ms. Kimbroughson.  She was appreciative and states that she also spoke with Dr. Frausto's office to alert them of the need for Zio patch placement.  Await orders.          Lexi Arceo RN    9/9/2020, 11:57

## 2020-09-11 LAB
ANTI-PHOSPHATIDIC ACID: NORMAL
ANTI-PHOSPHATIDIC,IGA: 3.9 U/ML
ANTI-PHOSPHATIDIC,IGG: 5.6 U/ML
ANTI-PHOSPHATIDIC,IGM: 4.7 U/ML
ANTI-PHOSPHATIDYL GLYCEROL, IGA: 3.3 U/ML
ANTI-PHOSPHATIDYL GLYCEROL, IGG: 3.7 U/ML
ANTI-PHOSPHATIDYL GLYCEROL, IGM: 3.4 U/ML
ANTI-PHOSPHATIDYL GLYCEROL: NORMAL
ANTI-PHOSPHATIDYL INOSITOL: NORMAL
ANTI-PHOSPHATIDYL,IGA: 1.8 U/ML
ANTI-PHOSPHATIDYL,IGG: 1.5 U/ML
ANTI-PHOSPHATIDYL,IGM: 1.7 U/ML
ANTI-PHOSPHATIDYLETHANOLAMINE, IGA: 3 U/ML
ANTI-PHOSPHATIDYLETHANOLAMINE, IGG: 0.1 U/ML
ANTI-PHOSPHATIDYLETHANOLAMINE, IGM: 0.3 U/ML
ANTI-PHOSPHATIDYLETHANOLAMINE: NORMAL

## 2020-09-16 ENCOUNTER — READMISSION MANAGEMENT (OUTPATIENT)
Dept: CALL CENTER | Facility: HOSPITAL | Age: 60
End: 2020-09-16

## 2020-09-16 NOTE — OUTREACH NOTE
Medical Week 2 Survey      Responses   [facility] patient discharged from?  [place]   COVID-19 Test Status  Negative   Does the patient have one of the following disease processes/diagnoses(primary or secondary)?  Other   Week 2 attempt successful?  Yes   Call start time  1147   Discharge diagnosis  Splenic infarction    Call end time  1156   Meds reviewed with patient/caregiver?  Yes   Is the patient taking all medications as directed (includes completed medication regime)?  Yes   Has the patient kept scheduled appointments due by today?  N/A   Has home health visited the patient within 72 hours of discharge?  N/A   Pulse Ox monitoring  None   What is the patient's perception of their health status since discharge?  Improving   Week 2 Call Completed?  Yes   Wrap up additional comments  Pain is improving but still gets bad in the evening.  Afebrile.  Has many questions about how long this should hurt. How will she know if has an infection.  Advised if has fever, increased pain, generalize feeling of malaise then need to be seen.  She refused blood thinner. She is on ASA therapy.  Seeing hematology tomorrow. Cardiology on 9/18.           Lori Hdez RN

## 2020-09-17 ENCOUNTER — OFFICE VISIT (OUTPATIENT)
Dept: ONCOLOGY | Facility: CLINIC | Age: 60
End: 2020-09-17

## 2020-09-17 ENCOUNTER — APPOINTMENT (OUTPATIENT)
Dept: OTHER | Facility: HOSPITAL | Age: 60
End: 2020-09-17

## 2020-09-17 VITALS
BODY MASS INDEX: 32.84 KG/M2 | DIASTOLIC BLOOD PRESSURE: 81 MMHG | HEIGHT: 68 IN | RESPIRATION RATE: 16 BRPM | SYSTOLIC BLOOD PRESSURE: 124 MMHG | TEMPERATURE: 97.3 F | HEART RATE: 79 BPM | WEIGHT: 216.7 LBS | OXYGEN SATURATION: 95 %

## 2020-09-17 DIAGNOSIS — R91.1 PULMONARY NODULE, LEFT: Primary | ICD-10-CM

## 2020-09-17 DIAGNOSIS — D73.5 SPLENIC INFARCTION: Primary | ICD-10-CM

## 2020-09-17 DIAGNOSIS — R91.1 PULMONARY NODULE, LEFT: ICD-10-CM

## 2020-09-17 LAB
BASOPHILS # BLD AUTO: 0.04 10*3/MM3 (ref 0–0.2)
BASOPHILS NFR BLD AUTO: 0.5 % (ref 0–1.5)
DEPRECATED RDW RBC AUTO: 38.8 FL (ref 37–54)
EOSINOPHIL # BLD AUTO: 0.16 10*3/MM3 (ref 0–0.4)
EOSINOPHIL NFR BLD AUTO: 2 % (ref 0.3–6.2)
ERYTHROCYTE [DISTWIDTH] IN BLOOD BY AUTOMATED COUNT: 12.3 % (ref 12.3–15.4)
HCT VFR BLD AUTO: 40.8 % (ref 34–46.6)
HGB BLD-MCNC: 14.1 G/DL (ref 12–15.9)
IMM GRANULOCYTES # BLD AUTO: 0.05 10*3/MM3 (ref 0–0.05)
IMM GRANULOCYTES NFR BLD AUTO: 0.6 % (ref 0–0.5)
LYMPHOCYTES # BLD AUTO: 2.01 10*3/MM3 (ref 0.7–3.1)
LYMPHOCYTES NFR BLD AUTO: 25.4 % (ref 19.6–45.3)
MCH RBC QN AUTO: 30.1 PG (ref 26.6–33)
MCHC RBC AUTO-ENTMCNC: 34.6 G/DL (ref 31.5–35.7)
MCV RBC AUTO: 87 FL (ref 79–97)
MONOCYTES # BLD AUTO: 0.56 10*3/MM3 (ref 0.1–0.9)
MONOCYTES NFR BLD AUTO: 7.1 % (ref 5–12)
NEUTROPHILS NFR BLD AUTO: 5.09 10*3/MM3 (ref 1.7–7)
NEUTROPHILS NFR BLD AUTO: 64.4 % (ref 42.7–76)
NRBC BLD AUTO-RTO: 0 /100 WBC (ref 0–0.2)
PLATELET # BLD AUTO: 224 10*3/MM3 (ref 140–450)
PMV BLD AUTO: 9.5 FL (ref 6–12)
RBC # BLD AUTO: 4.69 10*6/MM3 (ref 3.77–5.28)
WBC # BLD AUTO: 7.91 10*3/MM3 (ref 3.4–10.8)

## 2020-09-17 PROCEDURE — 36415 COLL VENOUS BLD VENIPUNCTURE: CPT

## 2020-09-17 PROCEDURE — 85025 COMPLETE CBC W/AUTO DIFF WBC: CPT | Performed by: INTERNAL MEDICINE

## 2020-09-17 PROCEDURE — 99214 OFFICE O/P EST MOD 30 MIN: CPT | Performed by: INTERNAL MEDICINE

## 2020-09-17 RX ORDER — ACETAMINOPHEN 500 MG
500 TABLET ORAL EVERY 6 HOURS PRN
COMMUNITY

## 2020-09-17 RX ORDER — OXYCODONE HYDROCHLORIDE AND ACETAMINOPHEN 5; 325 MG/1; MG/1
1 TABLET ORAL EVERY 6 HOURS PRN
COMMUNITY

## 2020-09-17 NOTE — PROGRESS NOTES
.     REASON FOR FOLLOWUP:     Splenic infarction required hospitalization in early September 2020.                         HISTORY OF PRESENT ILLNESS:  The patient is a 60 y.o. year old female with a history of type 2 diabetes, hyperlipidemia, obstructive sleep apnea mild, peripheral artery disease who presented today for follow-up evaluation for her splenic infarction.    I saw this patient originally on 9/2/2020 as a consultation during her hospitalization because of splenic infarction.  At that time we requested comprehensive hypercoagulable work-up.    Patient was noted to be treated with intravenous heparin for anticoagulation.  We recommend to switch to Eliquis as outpatient.      However patient reports today that she is not on anticoagulation.  She does not want taking Eliquis or other anticoagulation medicine due to concern for bleeding.  She is currently on aspirin 81 mg daily.  She reports her abdomen pain has been much better.  She denies nausea vomiting.    Her laboratory study today showed normalized hemoglobin 14.1, normal platelets 224,000 WBC 7100 including ANC 5090, lymphocytes 2000.    On 8/31/2020, normal CBC including Hb 14.2, MCV 87.4, platelets 202,000 and a WBC 8350.  CT scan examination on 9/1/2020 reported splenic infarction.  Hypercoagulable work-up with laboratory studies on different days.  On 9/1/2020, she had slightly decreased Antithrombin activity 75% with normal reference %.  She was tested negative for lupus anticoagulant.  Negative for anticardiolipin antibodies.  As well as negative for antiphospholipid panel #2.  Also negative for factor V Leiden mutation.  Negative for direct RICARDO.    Laboratory studies on 9/3/2020 reported negative for anti-phosphatidylserine antibodies and negative for anti-beta-2 glycoprotein 1 antibodies.  Was also negative for factor II/prothrombin gene mutation.  Normal protein C activity 125%, normal protein S function 77%, mildly elevated  protein S free antigen 150%.  Developed mild anemia with Hb 11.4.    She did had worsening anemia with deidre Hb 10.5 on 9/5/2020.  Lab study that day reported ferritin 280 ng/mL, free iron 47 TIBC 2/1/1986 iron saturation 16%, folate is 16.16 ng/mL and vitamin B12 is 774 pg/mL.    Patient had a negative venous Doppler study for both legs on 9/1/2020.    She subsequently had a CT angiogram for chest abdomen, which reported evolving splenic infarction.  There was also a sub-a 6 mm pulmonary nodule within the lingula, otherwise no lung lesions, lymphadenopathy in jackson mediastinum and axilla, no evidence for pleural effusion or pneumothorax.     Patient also had negative SHERYL, no evidence for PFO or left atrial thrombus.    Past Medical History:   Diagnosis Date   • Diabetes mellitus (CMS/HCC)     type 2   • Hyperlipidemia    • CHRISTIANA (obstructive sleep apnea)    • PAD (peripheral artery disease) (CMS/HCC)    • Peripheral vascular disease of lower extremity (CMS/HCC)    • Pulmonary nodule, left    • Splenic infarction    • UTI (urinary tract infection)      No past surgical history on file.    HEMATOLOGY HISTORY:  The patient is a 60 y.o. year old female having type 2 diabetes, obstructive sleep apnea, lower extremity peripheral vascular disease who presented to the emergency room on 9/1/2020 because of sudden onset left abdomen pain. She initially had some pains and was treated for UTI because of increased frequency and urgency of urination. About 2-3 days ago, over the weekend she was having severe left upper abdomen pain radiating into the left lower chest and groin area. The pain was exacerbated by inspiration and movement. The patient had nausea but no vomiting. She denies odynophagia or dysphagia and she had no constipation or diarrhea, no blood per rectum.     The patient presented to the emergency room, and laboratory study reported hemoglobin 14.4, platelets 202,000 and WBC 8350 including neutrophils 5460,  lymphocytes 2000. Her chemistry lab reported lipase 11, creatinine 0.66, normal liver function panel except alkaline phosphatase 130, and sodium 134 but normal potassium 3.7 and calcium 10.0. Glucose was 267.  CT scan for the abdomen and pelvis reported evidence of spleen infarction within the central to lateral aspect of the spleen.      laboratory study today reported hemoglobin 11.6, platelets 170,000, WBC 6670 with neutrophils 4550.     The patient was admitted to the hospital for further management and she was started on heparin IV for anticoagulation.      At the time of interview, the patient reports she continues to have pain in the left upper quadrant, has not eased up since admission.      I reviewed laboratory study results, for hypercoagulable workup. She was already tested negative for factor V Leiden mutation. Other laboratory studies obtained include lupus anticoagulant, anticardiolipin antibody, antiphospholipid panel 2, and antithrombin-III.           MEDICATIONS    Current Outpatient Medications:   •  acetaminophen (TYLENOL) 500 MG tablet, Take 500 mg by mouth Every 6 (Six) Hours As Needed for Mild Pain ., Disp: , Rfl:   •  Alcohol Swabs (ALCOHOL PADS) 70 % pads, 30 pads Daily., Disp: 30 each, Rfl: 3  •  aspirin 81 MG chewable tablet, Chew 1 tablet Daily., Disp: 30 tablet, Rfl: 3  •  atorvastatin (Lipitor) 40 MG tablet, Take 1 tablet by mouth Daily., Disp: 30 tablet, Rfl: 3  •  Blood Glucose Monitoring Suppl (FREESTYLE LITE) device, 1 Device Every Morning., Disp: 1 each, Rfl: 0  •  glucose blood (FREESTYLE LITE) test strip, 1 each by Other route Every Morning. Use as instructed, E11.9, Disp: 100 each, Rfl: 12  •  insulin detemir (Levemir) 100 UNIT/ML injection, Inject 15 Units under the skin into the appropriate area as directed Daily., Disp: 360 mL, Rfl: 3  •  Insulin Pen Needle (PEN NEEDLES) 32G X 4 MM misc, Inject 1 each under the skin into the appropriate area as directed Every Morning., Disp:  "100 each, Rfl: 3  •  Insulin Syringe 31G X 5/16\" 0.3 ML misc, Inject 1 each under the skin into the appropriate area as directed Every Morning., Disp: 100 each, Rfl: 3  •  Lancets (FREESTYLE) lancets, 1 each by Other route Every Morning. Use as instructed, Disp: 100 each, Rfl: 12  •  Multiple Vitamins-Minerals (MULTIVITAMIN ADULT PO), Take 1 tablet by mouth., Disp: , Rfl:   •  NON FORMULARY, Take 1 tablet by mouth 3 (Three) Times a Day. Melavic supplement for DM, Disp: , Rfl:   •  oxyCODONE-acetaminophen (PERCOCET) 5-325 MG per tablet, Take 1 tablet by mouth Every 6 (Six) Hours As Needed., Disp: , Rfl:     ALLERGIES:     Allergies   Allergen Reactions   • Dilaudid [Hydromorphone Hcl] Nausea And Vomiting   • Doxycycline Hives   • Sulfa Antibiotics Hives   • Penicillins Unknown - Low Severity       SOCIAL HISTORY:       Social History     Socioeconomic History   • Marital status: Single     Spouse name: Not on file   • Number of children: Not on file   • Years of education: Not on file   • Highest education level: Not on file   Occupational History     Employer: DISABLED   Tobacco Use   • Smoking status: Former Smoker     Types: Cigarettes     Quit date:      Years since quittin.7   • Smokeless tobacco: Never Used   Substance and Sexual Activity   • Alcohol use: Never         FAMILY HISTORY:  No family history on file.    REVIEW OF SYSTEMS:  Review of Systems   HENT: Negative for nosebleeds, sinus pressure and sore throat.    Respiratory: Negative for cough and shortness of breath.    Cardiovascular: Negative for chest pain and leg swelling.   Gastrointestinal: Positive for abdominal pain (This has largely resolved). Negative for abdominal distention, anal bleeding, blood in stool, diarrhea and nausea.   Endocrine: Negative for cold intolerance and heat intolerance.   Musculoskeletal: Positive for arthralgias. Negative for joint swelling.   Skin: Negative for color change and pallor.   Allergic/Immunologic: " "Negative for immunocompromised state.   Hematological: Negative for adenopathy. Does not bruise/bleed easily.   Psychiatric/Behavioral: Negative for agitation and confusion.              Vitals:    09/17/20 0853   BP: 124/81   Pulse: 79   Resp: 16   Temp: 97.3 °F (36.3 °C)   SpO2: 95%   Weight: 98.3 kg (216 lb 11.2 oz)   Height: 172 cm (67.72\")   PainSc:   6   PainLoc: Comment: back and stomach area     Current Status 9/17/2020   ECOG score 1      PHYSICAL EXAM:      CONSTITUTIONAL:  Vital signs reviewed.  Well-developed well-nourished female, no distress, looks comfortable.  EYES:  Conjunctiva and lids unremarkable.  PERRLA  EARS,NOSE,MOUTH,THROAT:  Ears appear unremarkable.  Patient wears mask due to the pandemic coronavirus infection.   RESPIRATORY:  Normal respiratory effort.  Lungs clear to auscultation bilaterally.  CARDIOVASCULAR: Regular rhythm and rate.  Normal S1, S2.  No murmurs rubs or gallops.  No significant lower extremity edema.  GASTROINTESTINAL: Abdomen appears unremarkable.  Nontender.  No hepatomegaly.  No splenomegaly.  Normal bowel sounds.  LYMPHATIC:  No cervical, supraclavicular, axillary lymphadenopathy.  MUSCULOSKELETAL:  Unremarkable gait and station.  Unremarkable digits/nails.  No cyanosis or clubbing.  SKIN:  Warm.  No rashes.  PSYCHIATRIC:  Normal judgment and insight.  Normal mood and affect.      RECENT LABS:          IMAGING STUDY:    1.  CT ABDOMEN PELVIS WITH IV CONTRAST 9/1/2020     HISTORY: Left flank pain with onset Saturday. Left upper quadrant pain.     TECHNIQUE: CT abdomen and pelvis with intravenous and without oral  contrast.     COMPARISON: None.     FINDINGS: Lung bases appear clear and there is no basilar or pleural  effusion. There is diffuse fat infiltration of the liver. Gallbladder,  adrenal glands, pancreas, kidneys appear within normal limits. There is  no hydronephrosis.     Within the central to lateral aspect of the spleen, there is an area of  linear " hypodensity with subtle volume loss at its periphery and this is  consistent with splenic infarction. There is no perisplenic fluid or  hemorrhage to suggest that this represents a laceration.     There is no bowel dilatation or evidence for bowel obstruction. Normal  appendix is visualized. Urinary bladder is mostly decompressed.     IMPRESSION:  1. Linear low-density within the spleen is consistent with a splenic  infarction. No perisplenic fluid or other findings to suggest  laceration.  2. Diffuse fat infiltration of the liver.     2.  CT ANGIOGRAM OF THE CHEST. MULTIPLE CORONAL, SAGITTAL, AND 3D  RECONSTRUCTIONS     HISTORY: Evaluate for proximal source of splenic artery thromboembolism.     TECHNIQUE: Radiation dose reduction techniques were utilized, including  automated exposure control and exposure modulation based on body size.   CT angiogram of the chest was performed. Multiple coronal, sagittal, and  3-D reconstruction images were obtained.      COMPARISON:CT AP 09/01/2020.     FINDINGS:      CHEST:  There is no hilar, mediastinal or axillary adenopathy by size criteria.  Heart is normal in size. There is no significant pericardial effusion.     There is no pulmonary consolidation, pleural effusion or pneumothorax.  Sub-6 mm pulmonary nodule within the lingula.     No significant atherosclerotic disease is visualized within the thoracic  aorta. There are a few tiny foci of calcification along the aortic arch.  While no discrete dissection flap is visualized, please note evaluation  is suboptimal due to the lack of cardiac gating performed.     ABDOMEN AND PELVIS:  Hepatic steatosis is present. The appendix is unremarkable.     There are no findings of small bowel obstruction.     While this exam is not tailored for detailed evaluation of the abdominal  viscera due to contrast timing, no gross abnormality is visualized  within the gallbladder, pancreas, adrenal glands or kidneys. There is  no  hydronephrosis.     Wedge-shaped area of hypoenhancement with associated volume loss  projects through the lateral aspect of the spleen peripherally, as seen  on recent CT.     There is no abdominopelvic adenopathy by size criteria. There is no free  intraperitoneal air or fluid.     The origins of the celiac, superior mesenteric and bilateral renal  arteries are widely patent. Minimal atherosclerotic calcification is  present within the right common iliac artery and distal aorta without  significant stenosis.     BONE WINDOWS: There are no suspicious lytic or blastic osseous lesions.     IMPRESSION:  1.  Minimal atherosclerotic calcification is present within the aortic  arch, distal aorta and right common iliac artery without significant  stenosis.  2.  Findings of evolving splenic infarct, as before.  3.  Hepatic steatosis.  4.  Sub-6 mm pulmonary nodule within the lingula. If this patient is at  increased risk for lung cancer, follow-up chest CT in 12 months can be  considered to ensure stability. If this patient is not at increased risk  for lung cancer, no further follow-up is necessary per Fleischner  criteria.  5.  Other findings as above.     3.  Transesophageal echocardiogram 9/4/2020 interpretation Summary    · Left ventricular systolic function is normal. Estimated EF appears to be in the range of 61 - 65%.  · Left ventricular wall thickness is consistent with mild concentric hypertrophy.  · Left ventricular diastolic function is normal.  · Left atrial cavity size is mildly dilated.  · No evidence of a left atrial appendage thrombus was present.  · No evidence of a patent foramen ovale. Saline test results are negative.  · There is a large and prominent fibrin strand on the left ventricular surface of the noncoronary aortic leaflet.  · There were mild to moderate nonmobile plaques in the aortic arch.  · There is no evidence of pericardial effusion.            Assessment/Plan   1.  Splenic infarction  in early September 2020.  Patient had mildly decreased Antithrombin 75%, otherwise completely negative for comprehensive hypercoagulable work-up studies.      I discussed with the patient, on the Antithrombin III needs to be repeated in the future.  Her level is only slightly low, I do not think that is the true problem.    This patient should be fully anticoagulated.  However she refused at a time of discharge from hospital, and also refused to the.  She would rather taking low-dose aspirin for now.    Patient lives in [place], will return to [place] next week.  She reports my coming back in December 2020 [facility] for visiting family.  She may come back for follow-up with us.    I have asked patient to establish care with a local hematologist for ongoing care.    2.  Small pulmonary nodule sub-6 mm.  Patient needs to have repeat a CT scan in 6 months.  She needs to follow-up with local primary care physician.      PLAN:  1. Continue aspirin 81 mg daily.  She refused to be fully anticoagulated.  2. We will arrange patient come back in 3 months for follow-up, repeat a CBC and Antithrombin a few days prior to MD visit.    I reviewed detailed results for hypercoagulable work-up with the patient today.      CHAITANYA DOUGHERTY M.D., Ph.D.    9/17/2020.

## 2020-09-18 ENCOUNTER — OFFICE VISIT (OUTPATIENT)
Dept: CARDIOLOGY | Facility: CLINIC | Age: 60
End: 2020-09-18

## 2020-09-18 VITALS
SYSTOLIC BLOOD PRESSURE: 112 MMHG | HEIGHT: 68 IN | DIASTOLIC BLOOD PRESSURE: 76 MMHG | BODY MASS INDEX: 32.58 KG/M2 | WEIGHT: 215 LBS | HEART RATE: 87 BPM

## 2020-09-18 DIAGNOSIS — D73.5 SPLENIC INFARCT: Primary | ICD-10-CM

## 2020-09-18 PROCEDURE — 93000 ELECTROCARDIOGRAM COMPLETE: CPT | Performed by: INTERNAL MEDICINE

## 2020-09-18 PROCEDURE — 99213 OFFICE O/P EST LOW 20 MIN: CPT | Performed by: INTERNAL MEDICINE

## 2020-09-18 NOTE — PROGRESS NOTES
Subjective:     Encounter Date:09/18/2020      Patient ID: Narcisa Hammer is a 60 y.o. female.    Chief Complaint: splenic infarct  HPI:   This is a 60-year-old woman who was recently hospitalized for a splenic infarct.  She had an extensive work-up for an embolic source which was unrevealing.  She was discharged with a ZIO patch.  She is still wearing the monitor.  She is from [place] and is planning to go back later this weekend.  She may return to [place] at [place] time, but otherwise plans to get set up with a cardiologist there.  She has had no palpitations, chest pain, dyspnea.  She has unilateral lower extremity varicose veins on the left.  These are chronic for her.  She did have Dopplers to rule out DVT while hospitalized.  There is no history of clotting disorder.  She has been evaluated by hematology has not seen any obvious thrombophilia.  She does not smoke or drink.    The following portions of the patient's history were reviewed and updated as appropriate: allergies, current medications, past family history, past medical history, past social history, past surgical history and problem list.     REVIEW OF SYSTEMS:   All systems reviewed.  Pertinent positives identified in HPI.  All other systems are negative.    Past Medical History:   Diagnosis Date   • Diabetes mellitus (CMS/[facility])     type 2   • Hyperlipidemia    • CHRISTIANA (obstructive sleep apnea)    • PAD (peripheral artery disease) (CMS/[facility])    • Peripheral vascular disease of lower extremity (CMS/[facility])    • Pulmonary nodule, left    • Splenic infarction    • UTI (urinary tract infection)        No family history on file.    Social History     Socioeconomic History   • Marital status: Single     Spouse name: Not on file   • Number of children: Not on file   • Years of education: Not on file   • Highest education level: Not on file   Occupational History     Employer: DISABLED   Tobacco Use   • Smoking status: Former Smoker     Types: Cigarettes      Quit date:      Years since quittin.7   • Smokeless tobacco: Never Used   Substance and Sexual Activity   • Alcohol use: Never       Allergies   Allergen Reactions   • Dilaudid [Hydromorphone Hcl] Nausea And Vomiting   • Doxycycline Hives   • Sulfa Antibiotics Hives   • Penicillins Unknown - Low Severity       No past surgical history on file.      ECG 12 Lead    Date/Time: 2020 4:52 PM  Performed by: Hodan Frausto MD  Authorized by: Hodan Frausto MD   Comparison: compared with previous ECG from 2020  Similar to previous ECG  Rhythm: sinus rhythm  Rate: normal  Conduction: conduction normal  ST Flattening: V6, V5 and V4  T Waves: T waves normal  QRS axis: left  Other findings: left ventricular hypertrophy    Clinical impression: non-specific ECG               Objective:         PHYSICAL EXAM:  GEN: VSS, no distress  Eyes: normal sclera, normal lids and lashes  HENT: moist mucus membranes,   Respiratory: CTAB, no rales or wheezes  CV: RRR, no murmurs, , +2 DP and 2+ carotid pulses b/l  GI: NABS, soft,  Nontender, nondistended  MSK: Left lower extremity discoloration and prominent varicosities. no scoliosis or kyphosis  Skin: no rash, warm, dry  Heme/Lymph: no bruising or bleeding  Psych: organized thought, normal behavior and affect  Neuro: Cranial nerves grossly intact, Alert and Oriented x 3.         Assessment:          Diagnosis Plan   1. Splenic infarct            Plan:       1.  This is a 60-year-old woman who was recently hospitalized with a splenic infarct.  There is been no obvious source of embolism.  She is wearing a ZIO patch, and I will review these results when she returns the monitor.  Ideally she would have left the hospital on full dose anticoagulation, however she was apparently hesitant to do so and for now prefers to be on aspirin.  I explained to her that this is not really sufficient, however she wants to wait until the ZIO patch results return.  She plans to move to  "[place] later this weekend, so I will call her with those results and she can follow-up if she is back in the area at a later date.           Hodan Frausto MD  09/18/20  [facility]    Outpatient Encounter Medications as of 9/18/2020   Medication Sig Dispense Refill   • acetaminophen (TYLENOL) 500 MG tablet Take 500 mg by mouth Every 6 (Six) Hours As Needed for Mild Pain .     • Alcohol Swabs (ALCOHOL PADS) 70 % pads 30 pads Daily. 30 each 3   • aspirin 81 MG chewable tablet Chew 1 tablet Daily. (Patient taking differently: Chew 325 mg Daily.) 30 tablet 3   • Blood Glucose Monitoring Suppl (FREESTYLE LITE) device 1 Device Every Morning. 1 each 0   • glucose blood (FREESTYLE LITE) test strip 1 each by Other route Every Morning. Use as instructed, E11.9 100 each 12   • insulin aspart (novoLOG FLEXPEN) 100 UNIT/ML solution pen-injector sc pen Inject  under the skin into the appropriate area as directed 3 (Three) Times a Day With Meals.     • Insulin Pen Needle (PEN NEEDLES) 32G X 4 MM misc Inject 1 each under the skin into the appropriate area as directed Every Morning. 100 each 3   • Insulin Syringe 31G X 5/16\" 0.3 ML misc Inject 1 each under the skin into the appropriate area as directed Every Morning. 100 each 3   • Lancets (FREESTYLE) lancets 1 each by Other route Every Morning. Use as instructed 100 each 12   • Multiple Vitamins-Minerals (MULTIVITAMIN ADULT PO) Take 1 tablet by mouth.     • NON FORMULARY Take 1 tablet by mouth 3 (Three) Times a Day. Melavic supplement for DM     • oxyCODONE-acetaminophen (PERCOCET) 5-325 MG per tablet Take 1 tablet by mouth Every 6 (Six) Hours As Needed.     • atorvastatin (Lipitor) 40 MG tablet Take 1 tablet by mouth Daily. 30 tablet 3   • [DISCONTINUED] insulin detemir (Levemir) 100 UNIT/ML injection Inject 15 Units under the skin into the appropriate area as directed Daily. 360 mL 3     No facility-administered encounter medications on file as of 9/18/2020.  "

## 2020-10-15 ENCOUNTER — TELEPHONE (OUTPATIENT)
Dept: CARDIOLOGY | Facility: CLINIC | Age: 60
End: 2020-10-15

## 2020-10-15 NOTE — TELEPHONE ENCOUNTER
Pt notified of normal results and voiced understanding. She had no further questions at this time.

## 2020-10-15 NOTE — TELEPHONE ENCOUNTER
----- Message from Hodan Frausto MD sent at 10/14/2020  4:49 PM EDT -----  Please call patient with their normal test results. No AF

## 2020-12-11 ENCOUNTER — APPOINTMENT (OUTPATIENT)
Dept: LAB | Facility: HOSPITAL | Age: 60
End: 2020-12-11

## 2020-12-18 ENCOUNTER — APPOINTMENT (OUTPATIENT)
Dept: LAB | Facility: HOSPITAL | Age: 60
End: 2020-12-18

## 2023-09-09 LAB
ALBUMIN SERPL-MCNC: 4.5 G/DL (ref 3.5–5.2)
ALBUMIN/GLOB SERPL: 1.7 G/DL
ALP SERPL-CCNC: 141 U/L (ref 39–117)
ALT SERPL W P-5'-P-CCNC: 20 U/L (ref 1–33)
ANION GAP SERPL CALCULATED.3IONS-SCNC: 12 MMOL/L (ref 5–15)
AST SERPL-CCNC: 16 U/L (ref 1–32)
BASOPHILS # BLD AUTO: 0.03 10*3/MM3 (ref 0–0.2)
BASOPHILS NFR BLD AUTO: 0.3 % (ref 0–1.5)
BILIRUB SERPL-MCNC: 0.3 MG/DL (ref 0–1.2)
BILIRUB UR QL STRIP: NEGATIVE
BUN SERPL-MCNC: 11 MG/DL (ref 8–23)
BUN/CREAT SERPL: 15.3 (ref 7–25)
CALCIUM SPEC-SCNC: 9.8 MG/DL (ref 8.6–10.5)
CHLORIDE SERPL-SCNC: 98 MMOL/L (ref 98–107)
CLARITY UR: CLEAR
CO2 SERPL-SCNC: 23 MMOL/L (ref 22–29)
COLOR UR: YELLOW
CREAT SERPL-MCNC: 0.72 MG/DL (ref 0.57–1)
D-LACTATE SERPL-SCNC: 2.3 MMOL/L (ref 0.5–2)
DEPRECATED RDW RBC AUTO: 41.7 FL (ref 37–54)
EGFRCR SERPLBLD CKD-EPI 2021: 92.9 ML/MIN/1.73
EOSINOPHIL # BLD AUTO: 0.06 10*3/MM3 (ref 0–0.4)
EOSINOPHIL NFR BLD AUTO: 0.7 % (ref 0.3–6.2)
ERYTHROCYTE [DISTWIDTH] IN BLOOD BY AUTOMATED COUNT: 12.6 % (ref 12.3–15.4)
GLOBULIN UR ELPH-MCNC: 2.7 GM/DL
GLUCOSE SERPL-MCNC: 268 MG/DL (ref 65–99)
GLUCOSE UR STRIP-MCNC: ABNORMAL MG/DL
HCT VFR BLD AUTO: 41.4 % (ref 34–46.6)
HGB BLD-MCNC: 13.6 G/DL (ref 12–15.9)
HGB UR QL STRIP.AUTO: NEGATIVE
HOLD SPECIMEN: NORMAL
HOLD SPECIMEN: NORMAL
IMM GRANULOCYTES # BLD AUTO: 0.04 10*3/MM3 (ref 0–0.05)
IMM GRANULOCYTES NFR BLD AUTO: 0.5 % (ref 0–0.5)
KETONES UR QL STRIP: ABNORMAL
LEUKOCYTE ESTERASE UR QL STRIP.AUTO: NEGATIVE
LIPASE SERPL-CCNC: 12 U/L (ref 13–60)
LYMPHOCYTES # BLD AUTO: 1.68 10*3/MM3 (ref 0.7–3.1)
LYMPHOCYTES NFR BLD AUTO: 19.1 % (ref 19.6–45.3)
MCH RBC QN AUTO: 29.8 PG (ref 26.6–33)
MCHC RBC AUTO-ENTMCNC: 32.9 G/DL (ref 31.5–35.7)
MCV RBC AUTO: 90.8 FL (ref 79–97)
MONOCYTES # BLD AUTO: 0.49 10*3/MM3 (ref 0.1–0.9)
MONOCYTES NFR BLD AUTO: 5.6 % (ref 5–12)
NEUTROPHILS NFR BLD AUTO: 6.51 10*3/MM3 (ref 1.7–7)
NEUTROPHILS NFR BLD AUTO: 73.8 % (ref 42.7–76)
NITRITE UR QL STRIP: NEGATIVE
NRBC BLD AUTO-RTO: 0 /100 WBC (ref 0–0.2)
PH UR STRIP.AUTO: 6 [PH] (ref 5–8)
PLATELET # BLD AUTO: 218 10*3/MM3 (ref 140–450)
PMV BLD AUTO: 9.9 FL (ref 6–12)
POTASSIUM SERPL-SCNC: 4.1 MMOL/L (ref 3.5–5.2)
PROT SERPL-MCNC: 7.2 G/DL (ref 6–8.5)
PROT UR QL STRIP: NEGATIVE
RBC # BLD AUTO: 4.56 10*6/MM3 (ref 3.77–5.28)
SODIUM SERPL-SCNC: 133 MMOL/L (ref 136–145)
SP GR UR STRIP: 1.02 (ref 1–1.03)
UROBILINOGEN UR QL STRIP: ABNORMAL
WBC NRBC COR # BLD: 8.81 10*3/MM3 (ref 3.4–10.8)
WHOLE BLOOD HOLD COAG: NORMAL
WHOLE BLOOD HOLD SPECIMEN: NORMAL

## 2023-09-09 PROCEDURE — 84484 ASSAY OF TROPONIN QUANT: CPT | Performed by: EMERGENCY MEDICINE

## 2023-09-09 PROCEDURE — 81003 URINALYSIS AUTO W/O SCOPE: CPT

## 2023-09-09 PROCEDURE — 80053 COMPREHEN METABOLIC PANEL: CPT

## 2023-09-09 PROCEDURE — 99285 EMERGENCY DEPT VISIT HI MDM: CPT

## 2023-09-09 PROCEDURE — 85025 COMPLETE CBC W/AUTO DIFF WBC: CPT

## 2023-09-09 PROCEDURE — 83690 ASSAY OF LIPASE: CPT

## 2023-09-09 PROCEDURE — 83605 ASSAY OF LACTIC ACID: CPT

## 2023-09-09 PROCEDURE — 84145 PROCALCITONIN (PCT): CPT | Performed by: EMERGENCY MEDICINE

## 2023-09-09 RX ORDER — SODIUM CHLORIDE 0.9 % (FLUSH) 0.9 %
10 SYRINGE (ML) INJECTION AS NEEDED
Status: DISCONTINUED | OUTPATIENT
Start: 2023-09-09 | End: 2023-09-13 | Stop reason: HOSPADM

## 2023-09-10 ENCOUNTER — HOSPITAL ENCOUNTER (INPATIENT)
Facility: HOSPITAL | Age: 63
LOS: 3 days | Discharge: HOME | DRG: 392 | End: 2023-09-13
Attending: EMERGENCY MEDICINE | Admitting: HOSPITALIST
Payer: MEDICARE

## 2023-09-10 ENCOUNTER — APPOINTMENT (OUTPATIENT)
Dept: CT IMAGING | Facility: HOSPITAL | Age: 63
DRG: 392 | End: 2023-09-10
Payer: MEDICARE

## 2023-09-10 DIAGNOSIS — R10.32 LEFT LOWER QUADRANT ABDOMINAL PAIN: Primary | ICD-10-CM

## 2023-09-10 PROBLEM — R10.9 ABDOMINAL PAIN: Status: ACTIVE | Noted: 2023-09-10

## 2023-09-10 LAB
ANION GAP SERPL CALCULATED.3IONS-SCNC: 9.9 MMOL/L (ref 5–15)
BUN SERPL-MCNC: 9 MG/DL (ref 8–23)
BUN/CREAT SERPL: 15.5 (ref 7–25)
CALCIUM SPEC-SCNC: 8.8 MG/DL (ref 8.6–10.5)
CHLORIDE SERPL-SCNC: 102 MMOL/L (ref 98–107)
CO2 SERPL-SCNC: 24.1 MMOL/L (ref 22–29)
CREAT SERPL-MCNC: 0.58 MG/DL (ref 0.57–1)
D-LACTATE SERPL-SCNC: 1.7 MMOL/L (ref 0.5–2)
DEPRECATED RDW RBC AUTO: 40.6 FL (ref 37–54)
EGFRCR SERPLBLD CKD-EPI 2021: 100.6 ML/MIN/1.73
ERYTHROCYTE [DISTWIDTH] IN BLOOD BY AUTOMATED COUNT: 12.3 % (ref 12.3–15.4)
GEN 5 2HR TROPONIN T REFLEX: 10 NG/L
GLUCOSE BLDC GLUCOMTR-MCNC: 194 MG/DL (ref 70–130)
GLUCOSE BLDC GLUCOMTR-MCNC: 199 MG/DL (ref 70–130)
GLUCOSE BLDC GLUCOMTR-MCNC: 219 MG/DL (ref 70–130)
GLUCOSE BLDC GLUCOMTR-MCNC: 220 MG/DL (ref 70–130)
GLUCOSE SERPL-MCNC: 212 MG/DL (ref 65–99)
HCT VFR BLD AUTO: 36.5 % (ref 34–46.6)
HGB BLD-MCNC: 12.1 G/DL (ref 12–15.9)
MCH RBC QN AUTO: 29.5 PG (ref 26.6–33)
MCHC RBC AUTO-ENTMCNC: 33.2 G/DL (ref 31.5–35.7)
MCV RBC AUTO: 89 FL (ref 79–97)
PLATELET # BLD AUTO: 177 10*3/MM3 (ref 140–450)
PMV BLD AUTO: 9.9 FL (ref 6–12)
POTASSIUM SERPL-SCNC: 4.5 MMOL/L (ref 3.5–5.2)
PROCALCITONIN SERPL-MCNC: 0.02 NG/ML (ref 0–0.25)
QT INTERVAL: 407 MS
QTC INTERVAL: 404 MS
RBC # BLD AUTO: 4.1 10*6/MM3 (ref 3.77–5.28)
SODIUM SERPL-SCNC: 136 MMOL/L (ref 136–145)
TROPONIN T DELTA: 0 NG/L
TROPONIN T SERPL HS-MCNC: 10 NG/L
WBC NRBC COR # BLD: 7.6 10*3/MM3 (ref 3.4–10.8)

## 2023-09-10 PROCEDURE — 93010 ELECTROCARDIOGRAM REPORT: CPT | Performed by: STUDENT IN AN ORGANIZED HEALTH CARE EDUCATION/TRAINING PROGRAM

## 2023-09-10 PROCEDURE — 83605 ASSAY OF LACTIC ACID: CPT | Performed by: EMERGENCY MEDICINE

## 2023-09-10 PROCEDURE — 93005 ELECTROCARDIOGRAM TRACING: CPT | Performed by: EMERGENCY MEDICINE

## 2023-09-10 PROCEDURE — 85027 COMPLETE CBC AUTOMATED: CPT | Performed by: NURSE PRACTITIONER

## 2023-09-10 PROCEDURE — 87040 BLOOD CULTURE FOR BACTERIA: CPT | Performed by: EMERGENCY MEDICINE

## 2023-09-10 PROCEDURE — 25010000002 ONDANSETRON PER 1 MG: Performed by: EMERGENCY MEDICINE

## 2023-09-10 PROCEDURE — 25010000002 MORPHINE PER 10 MG: Performed by: NURSE PRACTITIONER

## 2023-09-10 PROCEDURE — 25510000001 IOPAMIDOL PER 1 ML: Performed by: EMERGENCY MEDICINE

## 2023-09-10 PROCEDURE — 25010000002 ONDANSETRON PER 1 MG: Performed by: NURSE PRACTITIONER

## 2023-09-10 PROCEDURE — 25010000002 MORPHINE PER 10 MG: Performed by: EMERGENCY MEDICINE

## 2023-09-10 PROCEDURE — 99221 1ST HOSP IP/OBS SF/LOW 40: CPT | Performed by: STUDENT IN AN ORGANIZED HEALTH CARE EDUCATION/TRAINING PROGRAM

## 2023-09-10 PROCEDURE — 84484 ASSAY OF TROPONIN QUANT: CPT | Performed by: EMERGENCY MEDICINE

## 2023-09-10 PROCEDURE — 80048 BASIC METABOLIC PNL TOTAL CA: CPT | Performed by: NURSE PRACTITIONER

## 2023-09-10 PROCEDURE — 25010000002 KETOROLAC TROMETHAMINE PER 15 MG: Performed by: EMERGENCY MEDICINE

## 2023-09-10 PROCEDURE — 82948 REAGENT STRIP/BLOOD GLUCOSE: CPT

## 2023-09-10 PROCEDURE — 36415 COLL VENOUS BLD VENIPUNCTURE: CPT

## 2023-09-10 PROCEDURE — 63710000001 INSULIN LISPRO (HUMAN) PER 5 UNITS: Performed by: NURSE PRACTITIONER

## 2023-09-10 PROCEDURE — 74174 CTA ABD&PLVS W/CONTRAST: CPT

## 2023-09-10 PROCEDURE — 63710000001 ONDANSETRON PER 8 MG: Performed by: NURSE PRACTITIONER

## 2023-09-10 RX ORDER — DEXTROSE MONOHYDRATE 25 G/50ML
25 INJECTION, SOLUTION INTRAVENOUS
Status: DISCONTINUED | OUTPATIENT
Start: 2023-09-10 | End: 2023-09-11 | Stop reason: SDUPTHER

## 2023-09-10 RX ORDER — FENTANYL CITRATE 50 UG/ML
50 INJECTION, SOLUTION INTRAMUSCULAR; INTRAVENOUS ONCE
Status: DISCONTINUED | OUTPATIENT
Start: 2023-09-10 | End: 2023-09-10

## 2023-09-10 RX ORDER — MORPHINE SULFATE 2 MG/ML
2 INJECTION, SOLUTION INTRAMUSCULAR; INTRAVENOUS ONCE
Status: COMPLETED | OUTPATIENT
Start: 2023-09-10 | End: 2023-09-10

## 2023-09-10 RX ORDER — POLYETHYLENE GLYCOL 3350 17 G/17G
17 POWDER, FOR SOLUTION ORAL DAILY PRN
Status: DISCONTINUED | OUTPATIENT
Start: 2023-09-10 | End: 2023-09-10

## 2023-09-10 RX ORDER — MORPHINE SULFATE 2 MG/ML
1 INJECTION, SOLUTION INTRAMUSCULAR; INTRAVENOUS EVERY 4 HOURS PRN
Status: DISCONTINUED | OUTPATIENT
Start: 2023-09-10 | End: 2023-09-10

## 2023-09-10 RX ORDER — ACETAMINOPHEN 500 MG
1000 TABLET ORAL ONCE
Status: DISCONTINUED | OUTPATIENT
Start: 2023-09-10 | End: 2023-09-13 | Stop reason: HOSPADM

## 2023-09-10 RX ORDER — IBUPROFEN 600 MG/1
1 TABLET ORAL
Status: DISCONTINUED | OUTPATIENT
Start: 2023-09-10 | End: 2023-09-11 | Stop reason: SDUPTHER

## 2023-09-10 RX ORDER — SODIUM CHLORIDE 0.9 % (FLUSH) 0.9 %
10 SYRINGE (ML) INJECTION AS NEEDED
Status: DISCONTINUED | OUTPATIENT
Start: 2023-09-10 | End: 2023-09-13 | Stop reason: HOSPADM

## 2023-09-10 RX ORDER — KETOROLAC TROMETHAMINE 15 MG/ML
15 INJECTION, SOLUTION INTRAMUSCULAR; INTRAVENOUS ONCE
Status: COMPLETED | OUTPATIENT
Start: 2023-09-10 | End: 2023-09-10

## 2023-09-10 RX ORDER — SODIUM CHLORIDE 0.9 % (FLUSH) 0.9 %
10 SYRINGE (ML) INJECTION EVERY 12 HOURS SCHEDULED
Status: DISCONTINUED | OUTPATIENT
Start: 2023-09-10 | End: 2023-09-13 | Stop reason: HOSPADM

## 2023-09-10 RX ORDER — NICOTINE POLACRILEX 4 MG
15 LOZENGE BUCCAL
Status: DISCONTINUED | OUTPATIENT
Start: 2023-09-10 | End: 2023-09-11 | Stop reason: SDUPTHER

## 2023-09-10 RX ORDER — CALCIUM CARBONATE 500 MG/1
2 TABLET, CHEWABLE ORAL 2 TIMES DAILY PRN
Status: DISCONTINUED | OUTPATIENT
Start: 2023-09-10 | End: 2023-09-13 | Stop reason: HOSPADM

## 2023-09-10 RX ORDER — ACETAMINOPHEN 325 MG/1
650 TABLET ORAL EVERY 4 HOURS PRN
Status: DISCONTINUED | OUTPATIENT
Start: 2023-09-10 | End: 2023-09-13 | Stop reason: HOSPADM

## 2023-09-10 RX ORDER — AMOXICILLIN 250 MG
2 CAPSULE ORAL 2 TIMES DAILY
Status: DISCONTINUED | OUTPATIENT
Start: 2023-09-10 | End: 2023-09-10

## 2023-09-10 RX ORDER — SIMETHICONE 80 MG
80 TABLET,CHEWABLE ORAL 4 TIMES DAILY PRN
Status: DISCONTINUED | OUTPATIENT
Start: 2023-09-10 | End: 2023-09-13 | Stop reason: HOSPADM

## 2023-09-10 RX ORDER — FAMOTIDINE 10 MG/ML
20 INJECTION, SOLUTION INTRAVENOUS DAILY
Status: DISCONTINUED | OUTPATIENT
Start: 2023-09-10 | End: 2023-09-12

## 2023-09-10 RX ORDER — ONDANSETRON 4 MG/1
4 TABLET, FILM COATED ORAL EVERY 6 HOURS PRN
Status: DISCONTINUED | OUTPATIENT
Start: 2023-09-10 | End: 2023-09-13 | Stop reason: HOSPADM

## 2023-09-10 RX ORDER — SODIUM CHLORIDE 9 MG/ML
125 INJECTION, SOLUTION INTRAVENOUS CONTINUOUS
Status: DISCONTINUED | OUTPATIENT
Start: 2023-09-10 | End: 2023-09-11

## 2023-09-10 RX ORDER — BISACODYL 5 MG/1
5 TABLET, DELAYED RELEASE ORAL DAILY PRN
Status: DISCONTINUED | OUTPATIENT
Start: 2023-09-10 | End: 2023-09-10

## 2023-09-10 RX ORDER — INSULIN LISPRO 100 [IU]/ML
2-7 INJECTION, SOLUTION INTRAVENOUS; SUBCUTANEOUS
Status: DISCONTINUED | OUTPATIENT
Start: 2023-09-10 | End: 2023-09-11

## 2023-09-10 RX ORDER — ACETAMINOPHEN 160 MG/5ML
650 SOLUTION ORAL EVERY 4 HOURS PRN
Status: DISCONTINUED | OUTPATIENT
Start: 2023-09-10 | End: 2023-09-13 | Stop reason: HOSPADM

## 2023-09-10 RX ORDER — SODIUM CHLORIDE 9 MG/ML
40 INJECTION, SOLUTION INTRAVENOUS AS NEEDED
Status: DISCONTINUED | OUTPATIENT
Start: 2023-09-10 | End: 2023-09-13 | Stop reason: HOSPADM

## 2023-09-10 RX ORDER — ASPIRIN 325 MG
325 TABLET ORAL DAILY
Status: DISCONTINUED | OUTPATIENT
Start: 2023-09-10 | End: 2023-09-13 | Stop reason: HOSPADM

## 2023-09-10 RX ORDER — ONDANSETRON 2 MG/ML
4 INJECTION INTRAMUSCULAR; INTRAVENOUS ONCE
Status: COMPLETED | OUTPATIENT
Start: 2023-09-10 | End: 2023-09-10

## 2023-09-10 RX ORDER — ONDANSETRON 2 MG/ML
4 INJECTION INTRAMUSCULAR; INTRAVENOUS EVERY 6 HOURS PRN
Status: DISCONTINUED | OUTPATIENT
Start: 2023-09-10 | End: 2023-09-13 | Stop reason: HOSPADM

## 2023-09-10 RX ORDER — BISACODYL 10 MG
10 SUPPOSITORY, RECTAL RECTAL DAILY PRN
Status: DISCONTINUED | OUTPATIENT
Start: 2023-09-10 | End: 2023-09-10

## 2023-09-10 RX ORDER — HYDROCODONE BITARTRATE AND ACETAMINOPHEN 7.5; 325 MG/1; MG/1
1 TABLET ORAL EVERY 4 HOURS PRN
Status: DISCONTINUED | OUTPATIENT
Start: 2023-09-10 | End: 2023-09-13 | Stop reason: HOSPADM

## 2023-09-10 RX ORDER — ACETAMINOPHEN 650 MG/1
650 SUPPOSITORY RECTAL EVERY 4 HOURS PRN
Status: DISCONTINUED | OUTPATIENT
Start: 2023-09-10 | End: 2023-09-13 | Stop reason: HOSPADM

## 2023-09-10 RX ORDER — ASPIRIN 325 MG
325 TABLET ORAL DAILY
COMMUNITY

## 2023-09-10 RX ADMIN — INSULIN LISPRO 3 UNITS: 100 INJECTION, SOLUTION INTRAVENOUS; SUBCUTANEOUS at 07:48

## 2023-09-10 RX ADMIN — SODIUM CHLORIDE 125 ML/HR: 9 INJECTION, SOLUTION INTRAVENOUS at 00:50

## 2023-09-10 RX ADMIN — SIMETHICONE 80 MG: 80 TABLET, CHEWABLE ORAL at 23:04

## 2023-09-10 RX ADMIN — MORPHINE SULFATE 2 MG: 2 INJECTION, SOLUTION INTRAMUSCULAR; INTRAVENOUS at 02:29

## 2023-09-10 RX ADMIN — HYDROCODONE BITARTRATE AND ACETAMINOPHEN 1 TABLET: 7.5; 325 TABLET ORAL at 09:03

## 2023-09-10 RX ADMIN — ONDANSETRON 4 MG: 2 INJECTION INTRAMUSCULAR; INTRAVENOUS at 09:06

## 2023-09-10 RX ADMIN — SENNOSIDES AND DOCUSATE SODIUM 2 TABLET: 50; 8.6 TABLET ORAL at 09:06

## 2023-09-10 RX ADMIN — HYDROCODONE BITARTRATE AND ACETAMINOPHEN 1 TABLET: 7.5; 325 TABLET ORAL at 18:48

## 2023-09-10 RX ADMIN — IOPAMIDOL 95 ML: 755 INJECTION, SOLUTION INTRAVENOUS at 01:55

## 2023-09-10 RX ADMIN — ONDANSETRON 4 MG: 2 INJECTION INTRAMUSCULAR; INTRAVENOUS at 02:30

## 2023-09-10 RX ADMIN — ONDANSETRON HYDROCHLORIDE 4 MG: 4 TABLET, FILM COATED ORAL at 21:33

## 2023-09-10 RX ADMIN — MORPHINE SULFATE 1 MG: 2 INJECTION, SOLUTION INTRAMUSCULAR; INTRAVENOUS at 04:45

## 2023-09-10 RX ADMIN — HYDROCODONE BITARTRATE AND ACETAMINOPHEN 1 TABLET: 7.5; 325 TABLET ORAL at 14:06

## 2023-09-10 RX ADMIN — SODIUM CHLORIDE 125 ML/HR: 9 INJECTION, SOLUTION INTRAVENOUS at 12:25

## 2023-09-10 RX ADMIN — ASPIRIN 325 MG: 325 TABLET ORAL at 10:53

## 2023-09-10 RX ADMIN — FAMOTIDINE 20 MG: 10 INJECTION INTRAVENOUS at 10:53

## 2023-09-10 RX ADMIN — ONDANSETRON 4 MG: 2 INJECTION INTRAMUSCULAR; INTRAVENOUS at 15:32

## 2023-09-10 RX ADMIN — KETOROLAC TROMETHAMINE 15 MG: 15 INJECTION, SOLUTION INTRAMUSCULAR; INTRAVENOUS at 00:50

## 2023-09-10 RX ADMIN — SODIUM CHLORIDE 1000 ML: 9 INJECTION, SOLUTION INTRAVENOUS at 00:50

## 2023-09-10 RX ADMIN — SODIUM CHLORIDE 125 ML/HR: 9 INJECTION, SOLUTION INTRAVENOUS at 20:35

## 2023-09-10 NOTE — ED PROVIDER NOTES
EMERGENCY DEPARTMENT ENCOUNTER    Room Number:  17/17  Date of encounter:  9/10/2023  PCP: Provider, No Known  Historian: Patient  Relevant information and history provided by sources other than the patient will be included below and in the ED Course.  Review of pertinent past medical records may also be included in record below and ED Course.    HPI:  Chief Complaint: Abdominal pain  A complete HPI/ROS/PMH/PSH/SH/FH are unobtainable due to: Not applicable  Context: Narcisa Hammer is a 63 y.o. female who presents to the ED c/o patient started having pain yesterday is located in the left side of her abdomen left lower left mid quadrant and radiates back to her left flank.  Describes it as an aching pain it is worse when she rolls over and moves.  It has been constant and will wax and wane in severity.  She has had no nausea or vomiting.  She has had no chest pain or shortness of breath.  Denies any coughs or colds.  Denies vomiting or diarrhea.  Denies fevers or chills.  She has had a history of a kidney stone in the distant past.  She states this feels somewhat similar to that.  She also states that in 2020 she had a splenic infarct.  She states that this also kind of feels similar to that.  She is unaware if she is ever had any diverticulitis or diverticulosis in the past.  After the splenic infarct she was on blood thinning medicine for approximately 6 months and is been only instructed take an aspirin.  Denies any burning with urination or frequent urination.  Denies any focal weakness to arms or legs        Previous Episodes: Yes see above  Current Symptoms: See above    MEDICAL HISTORY REVIEWED  Patient has a history of hyperlipidemia, splenic infarct, type 2 diabetes, obstructive sleep apnea    I reviewed the report of the CT angiogram of the chest and abdomen and pelvis that was done on 8/31/2020 there is minimal atherosclerotic calcification or changes there was findings of an evolving splenic infarct there  is hepatic steatosis there was a small 6 mm pulmonary nodule within the lingula of the left lung.  She also had a CT scan of the abdomen pelvis with contrast on 2020 before that angiogram that showed a splenic infarct..  PAST MEDICAL HISTORY  Active Ambulatory Problems     Diagnosis Date Noted    Splenic infarction 2020    Type 2 diabetes mellitus, without long-term current use of insulin 2020    PAD (peripheral artery disease) 2020    CHRISTIANA (obstructive sleep apnea) 2020    Hyperlipidemia 2020    Pulmonary nodule, left 2020     Resolved Ambulatory Problems     Diagnosis Date Noted    No Resolved Ambulatory Problems     Past Medical History:   Diagnosis Date    Diabetes mellitus     Peripheral vascular disease of lower extremity     UTI (urinary tract infection)          PAST SURGICAL HISTORY  History reviewed. No pertinent surgical history.      FAMILY HISTORY  History reviewed. No pertinent family history.      SOCIAL HISTORY  Social History     Socioeconomic History    Marital status: Single   Tobacco Use    Smoking status: Former     Types: Cigarettes     Quit date:      Years since quittin.    Smokeless tobacco: Never   Substance and Sexual Activity    Alcohol use: Never         ALLERGIES  Dilaudid [hydromorphone hcl], Doxycycline, Sulfa antibiotics, and Penicillins        REVIEW OF SYSTEMS  Review of Systems     All systems reviewed and negative except for those discussed in HPI.       PHYSICAL EXAM    I have reviewed the triage vital signs and nursing notes.    ED Triage Vitals   Temp Heart Rate Resp BP SpO2   23   96.5 °F (35.8 °C) 96 18 160/96 94 %      Temp src Heart Rate Source Patient Position BP Location FiO2 (%)   23 [id number] --   Tympanic Monitor Sitting Left arm        GENERAL: Anxious elderly female.  No acute distress.Vital signs on my  initial evaluation unremarkable  HENT: nares patent  Head/neck/ face are symmetric without gross deformity, signs of trauma, or swelling  EYES: no scleral icterus, no conjunctival pallor.  NECK: Supple, no meningismus  CV: regular rhythm, regular rate with intact distal pulses.  No murmur rub  RESPIRATORY: normal effort and no respiratory distress.  Clear to auscultation bilaterally.  Pain is not reproducible with deep breathing  ABDOMEN: soft and tenderness located to the left side of her abdomen with radiation to the left flank.  This is reproducible with palpation and rolling over and moving.  Patient is morbidly obese.  Normal inspection.  There is no guarding or rebound.  MUSCULOSKELETAL: no deformity.  No edema.  Intact distal pulses to upper and lower extremities that are equal strong and symmetric.  NEURO: alert and appropriate, moves all extremities, follows commands.  No focal motor or sensory changes.  SKIN: warm, dry    Vital signs and nursing notes reviewed.        LAB RESULTS  Recent Results (from the past 24 hour(s))   Comprehensive Metabolic Panel    Collection Time: 09/09/23 10:31 PM    Specimen: Blood   Result Value Ref Range    Glucose 268 (H) 65 - 99 mg/dL    BUN 11 8 - 23 mg/dL    Creatinine 0.72 0.57 - 1.00 mg/dL    Sodium 133 (L) 136 - 145 mmol/L    Potassium 4.1 3.5 - 5.2 mmol/L    Chloride 98 98 - 107 mmol/L    CO2 23.0 22.0 - 29.0 mmol/L    Calcium 9.8 8.6 - 10.5 mg/dL    Total Protein 7.2 6.0 - 8.5 g/dL    Albumin 4.5 3.5 - 5.2 g/dL    ALT (SGPT) 20 1 - 33 U/L    AST (SGOT) 16 1 - 32 U/L    Alkaline Phosphatase 141 (H) 39 - 117 U/L    Total Bilirubin 0.3 0.0 - 1.2 mg/dL    Globulin 2.7 gm/dL    A/G Ratio 1.7 g/dL    BUN/Creatinine Ratio 15.3 7.0 - 25.0    Anion Gap 12.0 5.0 - 15.0 mmol/L    eGFR 92.9 >60.0 mL/min/1.73   Lipase    Collection Time: 09/09/23 10:31 PM    Specimen: Blood   Result Value Ref Range    Lipase 12 (L) 13 - 60 U/L   Lactic Acid, Plasma    Collection Time: 09/09/23  10:31 PM    Specimen: Blood   Result Value Ref Range    Lactate 2.3 (C) 0.5 - 2.0 mmol/L   Green Top (Gel)    Collection Time: 09/09/23 10:31 PM   Result Value Ref Range    Extra Tube Hold for add-ons.    Lavender Top    Collection Time: 09/09/23 10:31 PM   Result Value Ref Range    Extra Tube hold for add-on    Gold Top - SST    Collection Time: 09/09/23 10:31 PM   Result Value Ref Range    Extra Tube Hold for add-ons.    Light Blue Top    Collection Time: 09/09/23 10:31 PM   Result Value Ref Range    Extra Tube Hold for add-ons.    CBC Auto Differential    Collection Time: 09/09/23 10:31 PM    Specimen: Blood   Result Value Ref Range    WBC 8.81 3.40 - 10.80 10*3/mm3    RBC 4.56 3.77 - 5.28 10*6/mm3    Hemoglobin 13.6 12.0 - 15.9 g/dL    Hematocrit 41.4 34.0 - 46.6 %    MCV 90.8 79.0 - 97.0 fL    MCH 29.8 26.6 - 33.0 pg    MCHC 32.9 31.5 - 35.7 g/dL    RDW 12.6 12.3 - 15.4 %    RDW-SD 41.7 37.0 - 54.0 fl    MPV 9.9 6.0 - 12.0 fL    Platelets 218 140 - 450 10*3/mm3    Neutrophil % 73.8 42.7 - 76.0 %    Lymphocyte % 19.1 (L) 19.6 - 45.3 %    Monocyte % 5.6 5.0 - 12.0 %    Eosinophil % 0.7 0.3 - 6.2 %    Basophil % 0.3 0.0 - 1.5 %    Immature Grans % 0.5 0.0 - 0.5 %    Neutrophils, Absolute 6.51 1.70 - 7.00 10*3/mm3    Lymphocytes, Absolute 1.68 0.70 - 3.10 10*3/mm3    Monocytes, Absolute 0.49 0.10 - 0.90 10*3/mm3    Eosinophils, Absolute 0.06 0.00 - 0.40 10*3/mm3    Basophils, Absolute 0.03 0.00 - 0.20 10*3/mm3    Immature Grans, Absolute 0.04 0.00 - 0.05 10*3/mm3    nRBC 0.0 0.0 - 0.2 /100 WBC   Procalcitonin    Collection Time: 09/09/23 10:31 PM    Specimen: Blood   Result Value Ref Range    Procalcitonin 0.02 0.00 - 0.25 ng/mL   High Sensitivity Troponin T    Collection Time: 09/09/23 10:31 PM    Specimen: Blood   Result Value Ref Range    HS Troponin T 10 (H) <10 ng/L   Urinalysis With Microscopic If Indicated (No Culture) - Urine, Clean Catch    Collection Time: 09/09/23 10:41 PM    Specimen: Urine, Clean Catch    Result Value Ref Range    Color, UA Yellow Yellow, Straw    Appearance, UA Clear Clear    pH, UA 6.0 5.0 - 8.0    Specific Gravity, UA 1.017 1.005 - 1.030    Glucose,  mg/dL (1+) (A) Negative    Ketones, UA Trace (A) Negative    Bilirubin, UA Negative Negative    Blood, UA Negative Negative    Protein, UA Negative Negative    Leuk Esterase, UA Negative Negative    Nitrite, UA Negative Negative    Urobilinogen, UA 0.2 E.U./dL 0.2 - 1.0 E.U./dL   ECG 12 Lead Other; Left-sided abdominal pain    Collection Time: 09/10/23 12:56 AM   Result Value Ref Range    QT Interval 407 ms    QTC Interval 404 ms   STAT Lactic Acid, Reflex    Collection Time: 09/10/23  2:12 AM    Specimen: Blood   Result Value Ref Range    Lactate 1.7 0.5 - 2.0 mmol/L   High Sensitivity Troponin T 2Hr    Collection Time: 09/10/23  2:12 AM    Specimen: Blood   Result Value Ref Range    HS Troponin T 10 (H) <10 ng/L    Troponin T Delta 0 >=-4 - <+4 ng/L       Ordered the above labs and independently reviewed the results.        RADIOLOGY  CT Angiogram Abdomen Pelvis    Result Date: 9/10/2023  Patient: RAN CALLEJAS  Time Out: 02:12 Exam(s): CTA ABDOMEN + PELVIS EXAM:   CT Angiography Abdomen and Pelvis With Intravenous Contrast CLINICAL HISTORY:    Reason for exam: Left sided abdominal pain and flank pain. Patient has had a history of a splenic infarct seen on CT in 2020.. TECHNIQUE:   Axial computed tomographic angiography images of the abdomen and pelvis with intravenous contrast.  CTDI is 24.68 mGy and DLP is 1362 mGy-cm.  This CT exam was performed according to the principle of ALARA (As Low As Reasonably Achievable) by using one or more of the following dose reduction techniques: automated exposure control, adjustment of the mA and or kV according to patient size, and or use of iterative reconstruction technique.   3D and MIP reconstructed images were created and reviewed. COMPARISON:   No relevant prior studies available. FINDINGS:   VASCULATURE:   Aorta:  No acute findings.  No abdominal aortic aneurysm.  No dissection.   Celiac trunk and mesenteric arteries:  No acute findings.  No occlusion or significant stenosis.   Renal arteries:  No acute findings.  No occlusion or significant stenosis.   Iliac arteries:  No acute findings.  No occlusion or significant stenosis.   Lung bases:  Unremarkable.  No mass.  No consolidation.  ABDOMEN:   Liver:  Unremarkable.  No mass.   Gallbladder and bile ducts:  Unremarkable.  No calcified stones.  No ductal dilation.   Pancreas:  Unremarkable.  No ductal dilation.  No mass.   Spleen:  Normal appearance of the spleen.   Adrenals:  Unremarkable.  No mass.   Kidneys and ureters:  Unremarkable.  No hydronephrosis.  No solid mass.   Stomach and bowel:  Unremarkable.  No obstruction.  No mucosal thickening.  PELVIS:   Appendix:  No findings to suggest acute appendicitis.   Bladder:  Unremarkable.  No mass.   Reproductive:  Unremarkable as visualized.  ABDOMEN and PELVIS:   Intraperitoneal space:  Unremarkable.  No significant fluid collection.  No free air.   Bones joints:  No acute fracture.  No dislocation.   Soft tissues:  Unremarkable.   Lymph nodes:  Unremarkable.  No enlarged lymph nodes. IMPRESSION: No acute abnormality.    Electronically signed by Caleb Carrasco MD on 09-10-23 at 0212     I ordered the above noted radiological studies. Reviewed by me and discussed with radiologist.  See dictation for official radiology interpretation.      PROCEDURES    Procedures      MEDICATIONS GIVEN IN ER    Medications   sodium chloride 0.9 % flush 10 mL (has no administration in time range)   sodium chloride 0.9 % infusion (125 mL/hr Intravenous New Bag 9/10/23 0050)   acetaminophen (TYLENOL) tablet 1,000 mg (1,000 mg Oral Not Given 9/10/23 0051)   sodium chloride 0.9 % bolus 1,000 mL (0 mL Intravenous Stopped 9/10/23 0233)   ketorolac (TORADOL) injection 15 mg (15 mg Intravenous Given 9/10/23 0050)   iopamidol  (ISOVUE-370) 76 % injection 100 mL (95 mL Intravenous Given 9/10/23 0155)   ondansetron (ZOFRAN) injection 4 mg (4 mg Intravenous Given 9/10/23 0230)   morphine injection 2 mg (2 mg Intravenous Given 9/10/23 0229)         All labs have been independently reviewed by me.  All radiology studies have been reviewed by me and I discussed with radiologist dictating the report when indicated below.  All EKG's independently viewed and interpreted by me.  Discussion below represents my analysis of pertinent findings related to patient's condition, differential diagnosis, treatment plan and final disposition.        PROGRESS, DATA ANALYSIS, CONSULTS, AND MEDICAL DECISION MAKING    Differential diagnosis includes   - hepatobiliary pathology such as cholecystitis, cholangitis, and symptomatic cholelithiasis  -PUD  -Mesenteric ischemia  - Pancreatitis  - Dyspepsia  - Small bowel or large bowel obstruction  - Appendicitis  - Diverticulitis  - UTI including pyelonephritis  - Ureteral stone  - Zoster  - Colitis, including infectious and ischemic  - Atypical ACS    She does not want anything for narcotics at this time.  We will try Tylenol and Toradol.  Informed her of the test that we will order.  All questions answered at this time.    ED Course as of 09/10/23 0324   Sun Sep 10, 2023   0022 Lactate(!!): 2.3 [MM]   0109 My own independent rotation of the EKG that was done at 12:56 AM reveals a rate of 59 it is sinus rhythm there is some very mild intravelar conduction delay with signs of LVH I do not appreciate any acute injury pattern QT looks unremarkable  I compared this to the previous EKG that was done on 9/4/2020 and it looks very similar. [MM]   0242 Lactate: 1.7 [MM]   0245 I reviewed the CT scan report of the abdomen pelvis from the radiologist.  There is no obvious acute abnormality appreciated.  No signs of splenic infarct, kidney stones, or any other infectious process. [MM]   0245 Please see complete dictated report  from radiologist [MM]   0245 Patient had no improvement with the Toradol.  She wanted to try a small dose of morphine. [MM]   0245 Reassessed the patient and informed the results of the test.  Still having bad abdominal pain.  Still has reproducible abdominal pain on the left side of her abdomen.  Her belly is soft there is no guarding or rebound.  Patient does not feel comfortable going home with this pain  She really does not want any more morphine at this time because of the side effects. [MM]   0247 Patient is very nervous and anxious.  She is very worried about trying any new medicines.  She does not want to try any other medicine such as Inapsine.  And does not want any more morphine at this time. [MM]   0308 I did discuss the case with Niya who is the midlevel provider on for [facility].  She agrees for admission.  Dr. Handy is the attending. [MM]      ED Course User Index  [MM] Idris Farias MD       AS OF 03:24 EDT VITALS:    BP - 134/80  HR - 56  TEMP - 96.5 °F (35.8 °C) (Tympanic)  02 SATS - 98%    SOCIAL DETERMINANTS OF HEALTH THAT IMPACT OR LIMIT CARE (For example..Homelessness,safe discharge, inability to obtain care, follow up, or prescriptions):      DIAGNOSIS  Final diagnoses:   Left lower quadrant abdominal pain         DISPOSITION  I have reviewed the test results with my patient and explained the current treatment plan.  I answered all the patient's questions and concerns.  The patient is hemodynamically stable and is in no distress and is appropriate to be admitted to a medical/surgical floor bed at this time.            DICTATED UTILIZING DRAGON DICTATION    Note Disclaimer: At [facility], we believe that sharing information builds trust and better relationships. You are receiving this note because you recently visited [facility]. It is possible you will see health information before a provider has talked with you about it. This kind of information can be easy to misunderstand. To  help you fully understand what it means for your health, we urge you to discuss this note with your provider.       Idris Farias MD  09/10/23 0328

## 2023-09-10 NOTE — CONSULTS
General Surgery consult    Summary:    Mrs. Narcisa Hammer is a 63 y.o. year old lady with left lower quadrant abdominal pain.  I see no intra-abdominal etiology of her abdominal pain on CT scan done at admission.  Her labs and vitals are all normal as well.  No surgical intervention planned.  I discussed with the patient that I believe this is most likely musculoskeletal.  We will be available as needed, remainder of work-up per primary.  Can follow-up with our office as an outpatient for colonoscopy.    Chief Complaint:    Left lower quadrant abdominal pain    History of Present Illness:    Mrs. Narcisa Hammer is a 63 y.o. year old lady who presented to the ER with left lower quadrant abdominal pain since Thursday night.  She has a history of a splenic infarct and thought it felt similar to that.  She states she has had decreased p.o. intake but no nausea or vomiting.  She has been moving from [place] to [place] recently.  She is having regular bowel movements.  No fever or chills.  Her last colonoscopy was sometime around 10 years ago.    Past Medical History:   Past Medical History:   Diagnosis Date    Diabetes mellitus     type 2    Hyperlipidemia     CHRISTIANA (obstructive sleep apnea)     PAD (peripheral artery disease)     Peripheral vascular disease of lower extremity     Pulmonary nodule, left     Splenic infarction     UTI (urinary tract infection)       Past Surgical History:    Past Surgical History:   Procedure Laterality Date    COLONOSCOPY       Family History:    Family History   Problem Relation Age of Onset    Hypertension Mother     Diabetes Mother     Depression Mother     Cancer Mother     Alcohol abuse Mother     COPD Father     Arthritis Father     Asthma Daughter      Social History:    Social History     Socioeconomic History    Marital status: Single   Tobacco Use    Smoking status: Former     Types: Cigarettes     Quit date: 2000     Years since quittin.7    Smokeless tobacco: Never    Vaping Use    Vaping Use: Never used   Substance and Sexual Activity    Alcohol use: Never    Drug use: Never    Sexual activity: Defer     Allergies:   Allergies   Allergen Reactions    Dilaudid [Hydromorphone Hcl] Nausea And Vomiting    Doxycycline Hives    Sulfa Antibiotics Hives    Penicillins Unknown - Low Severity     Medications:     Current Facility-Administered Medications:     acetaminophen (TYLENOL) tablet 650 mg, 650 mg, Oral, Q4H PRN **OR** acetaminophen (TYLENOL) 160 MG/5ML solution 650 mg, 650 mg, Oral, Q4H PRN **OR** acetaminophen (TYLENOL) suppository 650 mg, 650 mg, Rectal, Q4H PRN, Niya Urias APRN    acetaminophen (TYLENOL) tablet 1,000 mg, 1,000 mg, Oral, Once, Idris Farias MD    aspirin tablet 325 mg, 325 mg, Oral, Daily, Yusuf Amaro MD    calcium carbonate (TUMS) chewable tablet 500 mg (200 mg elemental), 2 tablet, Oral, BID PRN, Niya Urias APRN    dextrose (D50W) (25 g/50 mL) IV injection 25 g, 25 g, Intravenous, Q15 Min PRN, Niya Urias APRN    dextrose (GLUTOSE) oral gel 15 g, 15 g, Oral, Q15 Min PRN, Niya Urias APRN    famotidine (PEPCID) injection 20 mg, 20 mg, Intravenous, Daily, Niya Urias APRN    glucagon (GLUCAGEN) injection 1 mg, 1 mg, Intramuscular, Q15 Min PRN, Niya Urias APRN    HYDROcodone-acetaminophen (NORCO) 7.5-325 MG per tablet 1 tablet, 1 tablet, Oral, Q4H PRN, Yusuf Amaro MD, 1 tablet at 09/10/23 0903    insulin lispro (HUMALOG/ADMELOG) injection 2-7 Units, 2-7 Units, Subcutaneous, 4x Daily AC & at Bedtime, Niya Urias APRN, 3 Units at 09/10/23 0748    ondansetron (ZOFRAN) tablet 4 mg, 4 mg, Oral, Q6H PRN **OR** ondansetron (ZOFRAN) injection 4 mg, 4 mg, Intravenous, Q6H PRN, Niya Urias, APRN, 4 mg at 09/10/23 0906    sodium chloride 0.9 % flush 10 mL, 10 mL, Intravenous, PRN, Idris Farias MD    sodium chloride 0.9 % flush 10 mL, 10 mL, Intravenous, Q12H, Adonay,  YULI Krause    sodium chloride 0.9 % flush 10 mL, 10 mL, Intravenous, PRN, Niya Urias APRN    sodium chloride 0.9 % infusion 40 mL, 40 mL, Intravenous, PRN, Niya Urias APRN    sodium chloride 0.9 % infusion, 125 mL/hr, Intravenous, Continuous, Idris Farias MD, Last Rate: 125 mL/hr at 09/10/23 0050, 125 mL/hr at 09/10/23 0050    Radiology/Endoscopy:    CT abdomen pelvis reviewed from earlier today: No intra-abdominal process identified, no evidence of diverticulitis or appendicitis, gallbladder appears unremarkable    Labs:    White blood cell count 7 hemoglobin 12 platelets 177 creatinine 0.58    Review of Systems:   Influenza-like illness: no fever, no  cough, no  sore throat, no  body aches, no loss of sense of taste or smell, no known exposure to person with Covid-19.  Constitutional: Negative for fevers or chills  HENT: Negative for hearing loss or runny nose  Eyes: Negative for vision changes or scleral icterus  Respiratory: Negative for cough or shortness of breath  Cardiovascular: Negative for chest pain or heart palpitations  Gastrointestinal: + For left lower quadrant abdominal pain negative for abdominal pain, nausea, vomiting, constipation, melena, or hematochezia  Genitourinary: Negative for hematuria or dysuria  Musculoskeletal: Negative for joint swelling or gait instability  Neurologic: Negative for tremors or seizures  Psychiatric: Negative for suicidal ideations or depression  All other systems reviewed and negative    Physical Exam:   Constitutional: Well-developed, well-nourished, no acute distress  Eyes:  Conjunctivae normal, sclerae nonicteric  ENMT: Hearing grossly normal, oral mucosa moist  Neck: Supple, trachea midline  Respiratory: Clear to auscultation, normal inspiratory effort  Cardiovascular: Regular rate, no peripheral edema, no jugular venous distention  Gastrointestinal: Soft, nontender, nondistended, complains of tenderness in the left lower quadrant  wrapping around to her back  Skin:  Warm, dry, no rash on visualized skin surfaces  Musculoskeletal: Symmetric strength, normal gait  Psychiatric: Alert and oriented ×3, normal affect     JLUIS MATHIAS M.D.  General and Endoscopic Surgery  [facility]    [address], [address]  [place], [place], [address]  P: [phone]  F: [phone]

## 2023-09-10 NOTE — PLAN OF CARE
Goal Outcome Evaluation:  Plan of Care Reviewed With: patient        Progress: no change  Outcome Evaluation: admitted this shift with c/o abd pain, scd, vdg, accuchecks, pt feels she has a kidney stone (not on dx) so urine strained per request, med for pain

## 2023-09-10 NOTE — PLAN OF CARE
Goal Outcome Evaluation:  Plan of Care Reviewed With: patient        Progress: no change  Outcome Evaluation: pt continue to c/o left lower abdominal discomfort and nausea.  emesis x1 this afternoon.  prn analgesia changed to PO and pt states helps a little bit.  IV zofran given x2.  glucose 212,220,219.  SSI given this am but pt refused this afternoon and this evening.  up ad joann.  seen by gen surgery in consult.  diet downgraded to full liquids.

## 2023-09-10 NOTE — ED NOTES
Nursing report ED to floor  Narcisa Hammer  63 y.o.  female    HPI :   Chief Complaint   Patient presents with    Abdominal Pain    Nausea       Admitting doctor:   No admitting provider for patient encounter.    Admitting diagnosis:   The encounter diagnosis was Left lower quadrant abdominal pain.    Code status:   Current Code Status       Date Active Code Status Order ID Comments User Context       Prior            Allergies:   Dilaudid [hydromorphone hcl], Doxycycline, Sulfa antibiotics, and Penicillins    Isolation:   No active isolations    Intake and Output    Intake/Output Summary (Last 24 hours) at 9/10/2023 0251  Last data filed at 9/10/2023 0233  Gross per 24 hour   Intake 1000 ml   Output --   Net 1000 ml       Weight:       09/09/23 2222   Weight: 104 kg (230 lb)       Most recent vitals:   Vitals:    09/09/23 2223 09/09/23 2229 09/10/23 0026 09/10/23 0126   BP: 160/96  144/74 134/80   BP Location: Left arm      Patient Position: Sitting      Pulse:  78 66 56   Resp:       Temp:       TempSrc:       SpO2:   98% 98%   Weight:       Height:           Active LDAs/IV Access:   Lines, Drains & Airways       Active LDAs       Name Placement date Placement time Site Days    Peripheral IV 09/09/23 2227 Left;Posterior Hand 09/09/23 2227  Hand  less than 1                    Labs (abnormal labs have a star):   Labs Reviewed   COMPREHENSIVE METABOLIC PANEL - Abnormal; Notable for the following components:       Result Value    Glucose 268 (*)     Sodium 133 (*)     Alkaline Phosphatase 141 (*)     All other components within normal limits    Narrative:     GFR Normal >60  Chronic Kidney Disease <60  Kidney Failure <15     LIPASE - Abnormal; Notable for the following components:    Lipase 12 (*)     All other components within normal limits   URINALYSIS W/ MICROSCOPIC IF INDICATED (NO CULTURE) - Abnormal; Notable for the following components:    Glucose,  mg/dL (1+) (*)     Ketones, UA Trace (*)     All other  components within normal limits    Narrative:     Urine microscopic not indicated.   LACTIC ACID, PLASMA - Abnormal; Notable for the following components:    Lactate 2.3 (*)     All other components within normal limits   CBC WITH AUTO DIFFERENTIAL - Abnormal; Notable for the following components:    Lymphocyte % 19.1 (*)     All other components within normal limits   TROPONIN - Abnormal; Notable for the following components:    HS Troponin T 10 (*)     All other components within normal limits    Narrative:     High Sensitive Troponin T Reference Range:  <10.0 ng/L- Negative Female for AMI  <15.0 ng/L- Negative Male for AMI  >=10 - Abnormal Female indicating possible myocardial injury.  >=15 - Abnormal Male indicating possible myocardial injury.   Clinicians would have to utilize clinical acumen, EKG, Troponin, and serial changes to determine if it is an Acute Myocardial Infarction or myocardial injury due to an underlying chronic condition.        HIGH SENSITIVITIY TROPONIN T 2HR - Abnormal; Notable for the following components:    HS Troponin T 10 (*)     All other components within normal limits    Narrative:     High Sensitive Troponin T Reference Range:  <10.0 ng/L- Negative Female for AMI  <15.0 ng/L- Negative Male for AMI  >=10 - Abnormal Female indicating possible myocardial injury.  >=15 - Abnormal Male indicating possible myocardial injury.   Clinicians would have to utilize clinical acumen, EKG, Troponin, and serial changes to determine if it is an Acute Myocardial Infarction or myocardial injury due to an underlying chronic condition.        LACTIC ACID, REFLEX - Normal   PROCALCITONIN - Normal    Narrative:     As a Marker for Sepsis (Non-Neonates):    1. <0.5 ng/mL represents a low risk of severe sepsis and/or septic shock.  2. >2 ng/mL represents a high risk of severe sepsis and/or septic shock.    As a Marker for Lower Respiratory Tract Infections that require antibiotic therapy:    PCT on  "Admission    Antibiotic Therapy       6-12 Hrs later    >0.5                Strongly Recommended  >0.25 - <0.5        Recommended   0.1 - 0.25          Discouraged              Remeasure/reassess PCT  <0.1                Strongly Discouraged     Remeasure/reassess PCT    As 28 day mortality risk marker: \"Change in Procalcitonin Result\" (>80% or <=80%) if Day 0 (or Day 1) and Day 4 values are available. Refer to http://www.[email or web address] [email or web address]pct[place]calculator.[place]    Change in PCT <=80%  A decrease of PCT levels below or equal to 80% defines a positive change in PCT test result representing a higher risk for 28-day all-cause mortality of patients diagnosed with severe sepsis for septic shock.    Change in PCT >80%  A decrease of PCT levels of more than 80% defines a negative change in PCT result representing a lower risk for 28-day all-cause mortality of patients diagnosed with severe sepsis or septic shock.      BLOOD CULTURE   BLOOD CULTURE   RAINBOW DRAW    Narrative:     The following orders were created for panel order [place] Draw.  Procedure                               Abnormality         Status                     ---------                               -----------         ------                     Green Top (Gel)[[id number]]                                  Final result               Lavender Top[[id number]]                                     Final result               Gold Top - SST[[id number]]                                   Final result               Light Blue Top[[id number]]                                   Final result                 Please view results for these tests on the individual orders.   CBC AND DIFFERENTIAL    Narrative:     The following orders were created for panel order CBC & Differential.  Procedure                               Abnormality         Status                     ---------                               -----------         ------                     CBC Auto Differential[[id number]]        " Abnormal            Final result                 Please view results for these tests on the individual orders.   GREEN TOP   LAVENDER TOP   GOLD TOP - SST   LIGHT BLUE TOP       EKG:   ECG 12 Lead Other; Left-sided abdominal pain   Preliminary Result   HEART RATE= 59  bpm   RR Interval= 1017  ms   MO Interval= 160  ms   P Horizontal Axis= -6  deg   P Front Axis= 52  deg   QRSD Interval= 92  ms   QT Interval= 407  ms   QTcB= 404  ms   QRS Axis= -28  deg   T Wave Axis= 7  deg   - BORDERLINE ECG -   Sinus rhythm   LVH by voltage   Baseline wander in lead(s) V1   Electronically Signed By:    Date and Time of Study: 2023-09-10 00:56:10          Meds given in ED:   Medications   sodium chloride 0.9 % flush 10 mL (has no administration in time range)   sodium chloride 0.9 % infusion (125 mL/hr Intravenous New Bag 9/10/23 0050)   acetaminophen (TYLENOL) tablet 1,000 mg (1,000 mg Oral Not Given 9/10/23 0051)   sodium chloride 0.9 % bolus 1,000 mL (0 mL Intravenous Stopped 9/10/23 0233)   ketorolac (TORADOL) injection 15 mg (15 mg Intravenous Given 9/10/23 0050)   iopamidol (ISOVUE-370) 76 % injection 100 mL (95 mL Intravenous Given 9/10/23 0155)   ondansetron (ZOFRAN) injection 4 mg (4 mg Intravenous Given 9/10/23 0230)   morphine injection 2 mg (2 mg Intravenous Given 9/10/23 0229)       Imaging results:  CT Angiogram Abdomen Pelvis    Result Date: 9/10/2023  Electronically signed by Caleb Carrasco MD on 09-10-23 at 0212     Ambulatory status:   - up adlib    Social issues:   Social History     Socioeconomic History    Marital status: Single   Tobacco Use    Smoking status: Former     Types: Cigarettes     Quit date: 2000     Years since quittin.7    Smokeless tobacco: Never   Substance and Sexual Activity    Alcohol use: Never       NIH Stroke Scale:       Jerome Andrade RN  09/10/23 02:51 EDT

## 2023-09-10 NOTE — ED TRIAGE NOTES
Patient ambulatory to ED from home with complaints of abdominal pain since yesterday that is getting worse today.  Pt reports that pain started in LLQ and is now radiating to right side.  Patient reports nausea.  Patient AA&Ox4 at triage

## 2023-09-10 NOTE — H&P
HISTORY AND PHYSICAL   [facility]        Date of Admission: 9/10/2023  Patient Identification:  Name: Narcisa Hammer  Age: 63 y.o.  Sex: female  :  1960  MRN: [id number]                     Primary Care Physician: Provider, No Known    Chief Complaint: Abdominal pain    History of Present Illness:   Mrs. Hammer is a pleasant 63-year-old female who has a past medical history of splenic infarction who presents to the hospital with acute left lower quadrant abdominal pain.  She claims this pain is constant and sharp and stabbing at times.  She has a past history of sciatica and she is not necessarily saying that it is revolving around from her backside.  She recently did move to [place] from [place] but she denies any known fall or jolting or injury with that move.  She does admit to past histories of PAD and PVD.  She denies any bloody stools she admits to nausea but no emesis.  No issues with fever chills or night sweats.  She has a past history of kidney stones as well though no stones are noted on CT and certainly no hydronephrosis was appreciated.  I specifically asked her if there were any additional stressors going on in her life with the recent move and she says that all of the move was due to her choice.  Apparently the son that she moved up here with I believe is involved with the [employer] and is returning from [place] today but again she states there is no unusual stressors there and if anything she made comment that the son was one of her favorites.  I went over all the work-up especially the normal labs and CT with the patient and sat at bedside to give my best explanation.  She seemed receptive and appreciative.    Past Medical History:  Past Medical History:   Diagnosis Date    Diabetes mellitus     type 2    Hyperlipidemia     CHRISTIANA (obstructive sleep apnea)     PAD (peripheral artery disease)     Peripheral vascular disease of lower extremity     Pulmonary nodule, left     Splenic  "infarction     UTI (urinary tract infection)      Past Surgical History:  Past Surgical History:   Procedure Laterality Date    COLONOSCOPY        Home Meds:  Medications Prior to Admission   Medication Sig Dispense Refill Last Dose    acetaminophen (TYLENOL) 500 MG tablet Take 1 tablet by mouth Every 6 (Six) Hours As Needed for Mild Pain.       Alcohol Swabs (ALCOHOL PADS) 70 % pads 30 pads Daily. 30 each 3     Blood Glucose Monitoring Suppl (FREESTYLE LITE) device 1 Device Every Morning. 1 each 0     glucose blood (FREESTYLE LITE) test strip 1 each by Other route Every Morning. Use as instructed, E11.9 100 each 12     insulin aspart (novoLOG FLEXPEN) 100 UNIT/ML solution pen-injector sc pen Inject  under the skin into the appropriate area as directed 3 (Three) Times a Day With Meals.       Insulin Pen Needle (PEN NEEDLES) 32G X 4 MM misc Inject 1 each under the skin into the appropriate area as directed Every Morning. 100 each 3     Insulin Syringe 31G X 5/16\" 0.3 ML misc Inject 1 each under the skin into the appropriate area as directed Every Morning. 100 each 3     Lancets (FREESTYLE) lancets 1 each by Other route Every Morning. Use as instructed 100 each 12     Multiple Vitamins-Minerals (MULTIVITAMIN ADULT PO) Take 1 tablet by mouth Daily.       aspirin 325 MG tablet Take 1 tablet by mouth Daily.       aspirin 81 MG chewable tablet Chew 1 tablet Daily. (Patient taking differently: Chew 325 mg Daily.) 30 tablet 3     atorvastatin (Lipitor) 40 MG tablet Take 1 tablet by mouth Daily. 30 tablet 3     NON FORMULARY Take 1 tablet by mouth 3 (Three) Times a Day. Melavic supplement for DM       oxyCODONE-acetaminophen (PERCOCET) 5-325 MG per tablet Take 1 tablet by mouth Every 6 (Six) Hours As Needed.          Allergies:  Allergies   Allergen Reactions    Dilaudid [Hydromorphone Hcl] Nausea And Vomiting    Doxycycline Hives    Sulfa Antibiotics Hives    Penicillins Unknown - Low Severity     Immunizations:    There is " "no immunization history on file for this patient.  Social History:   Social History     Social History Narrative    Not on file     Social History     Socioeconomic History    Marital status: Single   Tobacco Use    Smoking status: Former     Types: Cigarettes     Quit date:      Years since quittin.    Smokeless tobacco: Never   Vaping Use    Vaping Use: Never used   Substance and Sexual Activity    Alcohol use: Never    Drug use: Never    Sexual activity: Defer       Family History:  Family History   Problem Relation Age of Onset    Hypertension Mother     Diabetes Mother     Depression Mother     Cancer Mother     Alcohol abuse Mother     COPD Father     Arthritis Father     Asthma Daughter         Review of Systems  See history of present illness and past medical history.  Patient denies fever chills or night sweats.  Admits to nausea without emesis.  Admits to left lower quadrant abdominal pain.  Denies any changes in bowel regimen or bloody stools particularly.  Denies chest pain palpitation cough shortness of breath.  Denies any saddle anesthesias or bowel incontinence.  No new bruising bleeding or loss of function or consciousness.  Remainder of ROS is negative.    Objective:  T Max 24 hrs: Temp (24hrs), Av.1 °F (36.2 °C), Min:96.5 °F (35.8 °C), Max:97.6 °F (36.4 °C)    Vitals Ranges:   Temp:  [96.5 °F (35.8 °C)-97.6 °F (36.4 °C)] 97.2 °F (36.2 °C)  Heart Rate:  [56-96] 63  Resp:  [16-18] 16  BP: (125-160)/(63-96) 149/70      Exam:  /70 (BP Location: Right arm, Patient Position: Lying)   Pulse 63   Temp 97.2 °F (36.2 °C) (Oral)   Resp 16   Ht 172.7 cm (68\")   Wt 106 kg (233 lb 14.5 oz)   SpO2 98%   BMI 35.57 kg/m²     General Appearance:    Alert, cooperative, he is uncomfortable due to abdominal pain, conversational pleasant, good historian, nontoxic, no family present   Head:    Normocephalic, without obvious abnormality, atraumatic   Eyes:    PERRL, conjunctivae/corneas clear, " EOM's intact, both eyes   Ears:    Normal external ear canals, both ears   Nose:   Nares normal, septum midline, mucosa normal, no drainage    or sinus tenderness   Throat:   Lips, mucosa, and tongue normal though mucous membranes were a touch dry   Neck:   Supple, no meningismus or JVD   Back:     Symmetric, no curvature, no CVA tenderness   Lungs:     Clear to auscultation bilaterally, respirations unlabored   Chest Wall:    No tenderness or deformity    Heart:    Regular rate and rhythm, S1 and S2 normal   Abdomen:     Soft, she definitely guards that left lower quadrant and is slightly tender but I was able to have a conversation with her while palpating and I was even able to palpate deep and I could not appreciate any rebound or mass   Extremities: Moving all with baseline strength, no cyanosis or edema   Pulses:   2+ and symmetric all extremities   Skin:   Skin color normal, no rashes or lesions       Neurologic:   CNII-XII intact, no obvious focal deficits      .    Data Review:  Labs in chart were reviewed.             Imaging Results (All)       Procedure Component Value Units Date/Time    CT Angiogram Abdomen Pelvis [[id number]] Collected: 09/10/23 0213     Updated: 09/10/23 0213    Narrative:        Patient: RAN CALLEJAS  Time Out: 02:12  Exam(s): CTA ABDOMEN + PELVIS     EXAM:    CT Angiography Abdomen and Pelvis With Intravenous Contrast    CLINICAL HISTORY:     Reason for exam: Left sided abdominal pain and flank pain. Patient has   had a history of a splenic infarct seen on CT in 2020..    TECHNIQUE:    Axial computed tomographic angiography images of the abdomen and pelvis   with intravenous contrast.  CTDI is 24.68 mGy and DLP is 1362 mGy-cm.    This CT exam was performed according to the principle of ALARA (As Low As   Reasonably Achievable) by using one or more of the following dose   reduction techniques: automated exposure control, adjustment of the mA   and or kV according to patient size, and  or use of iterative   reconstruction technique.    3D and MIP reconstructed images were created and reviewed.    COMPARISON:    No relevant prior studies available.    FINDINGS:     VASCULATURE:    Aorta:  No acute findings.  No abdominal aortic aneurysm.  No   dissection.    Celiac trunk and mesenteric arteries:  No acute findings.  No occlusion   or significant stenosis.    Renal arteries:  No acute findings.  No occlusion or significant   stenosis.    Iliac arteries:  No acute findings.  No occlusion or significant   stenosis.      Lung bases:  Unremarkable.  No mass.  No consolidation.     ABDOMEN:    Liver:  Unremarkable.  No mass.    Gallbladder and bile ducts:  Unremarkable.  No calcified stones.  No   ductal dilation.    Pancreas:  Unremarkable.  No ductal dilation.  No mass.    Spleen:  Normal appearance of the spleen.    Adrenals:  Unremarkable.  No mass.    Kidneys and ureters:  Unremarkable.  No hydronephrosis.  No solid mass.    Stomach and bowel:  Unremarkable.  No obstruction.  No mucosal   thickening.     PELVIS:    Appendix:  No findings to suggest acute appendicitis.    Bladder:  Unremarkable.  No mass.    Reproductive:  Unremarkable as visualized.     ABDOMEN and PELVIS:    Intraperitoneal space:  Unremarkable.  No significant fluid collection.    No free air.    Bones joints:  No acute fracture.  No dislocation.    Soft tissues:  Unremarkable.    Lymph nodes:  Unremarkable.  No enlarged lymph nodes.    IMPRESSION:   No acute abnormality.    Impression:          Electronically signed by Caleb Carrasco MD on 09-10-23 at 0212              Assessment:  Active Hospital Problems    Diagnosis  POA    **Abdominal pain [R10.9]  Yes      Resolved Hospital Problems   No resolved problems to display.       Plan:    This patient seems legitimate in her complaints of abdominal pain but I do not have an exact source as to why?  The CT of her abdomen and pelvis is benign especially in that left lower quadrant.   Her exam was not the most impressive either and was able to palpate fairly deeply.  This is not a kidney stone and she does have a past history of splenic infarct but I personally think this could be more musculoskeletal related to recent move.  I indicated to her that I normally do not give IV narcotics further with a negative CT and given her history I am going to request general surgical consultation to ensure nothing additional is missed.  I gave consideration to look at vascular duplex given past history of PAD/PVD to see blood flow to the got but I would have anticipated I would have seen changes of ischemic colitis so I am holding that study until surgery can evaluate.  For the interim I will allow Norco to be used as needed as the patient does not having issues with emesis.  Continue IVF as her acidosis is more so due to starvation and I am not worried about underlying sepsis with negative work-up.  Reduce diet to full liquids and offer supportive care for now.    Sliding scale for diabetes    Reduce diet to full liquids -continue IVF - Lytes normal as are LFTs    SCDs for prophylaxis    Empiric Pepcid        Further recommendations to follow as clinical course unfolds    Yusuf Amaro MD  9/10/2023  09:08 EDT

## 2023-09-11 ENCOUNTER — APPOINTMENT (OUTPATIENT)
Dept: GENERAL RADIOLOGY | Facility: HOSPITAL | Age: 63
DRG: 392 | End: 2023-09-11
Payer: MEDICARE

## 2023-09-11 LAB
GLUCOSE BLDC GLUCOMTR-MCNC: 154 MG/DL (ref 70–130)
GLUCOSE BLDC GLUCOMTR-MCNC: 157 MG/DL (ref 70–130)
GLUCOSE BLDC GLUCOMTR-MCNC: 208 MG/DL (ref 70–130)
GLUCOSE BLDC GLUCOMTR-MCNC: 224 MG/DL (ref 70–130)

## 2023-09-11 PROCEDURE — 82948 REAGENT STRIP/BLOOD GLUCOSE: CPT

## 2023-09-11 PROCEDURE — 72100 X-RAY EXAM L-S SPINE 2/3 VWS: CPT

## 2023-09-11 PROCEDURE — 25010000002 MORPHINE PER 10 MG: Performed by: NURSE PRACTITIONER

## 2023-09-11 PROCEDURE — 94660 CPAP INITIATION&MGMT: CPT

## 2023-09-11 PROCEDURE — 73502 X-RAY EXAM HIP UNI 2-3 VIEWS: CPT

## 2023-09-11 PROCEDURE — 25010000002 ONDANSETRON PER 1 MG: Performed by: NURSE PRACTITIONER

## 2023-09-11 PROCEDURE — 94799 UNLISTED PULMONARY SVC/PX: CPT

## 2023-09-11 PROCEDURE — 63710000001 INSULIN LISPRO (HUMAN) PER 5 UNITS: Performed by: HOSPITALIST

## 2023-09-11 RX ORDER — PROCHLORPERAZINE EDISYLATE 5 MG/ML
5 INJECTION INTRAMUSCULAR; INTRAVENOUS ONCE
Status: DISCONTINUED | OUTPATIENT
Start: 2023-09-11 | End: 2023-09-13 | Stop reason: HOSPADM

## 2023-09-11 RX ORDER — NICOTINE POLACRILEX 4 MG
15 LOZENGE BUCCAL
Status: DISCONTINUED | OUTPATIENT
Start: 2023-09-11 | End: 2023-09-13 | Stop reason: HOSPADM

## 2023-09-11 RX ORDER — NICOTINE POLACRILEX 4 MG
15 LOZENGE BUCCAL
Status: DISCONTINUED | OUTPATIENT
Start: 2023-09-11 | End: 2023-09-11

## 2023-09-11 RX ORDER — MORPHINE SULFATE 2 MG/ML
2 INJECTION, SOLUTION INTRAMUSCULAR; INTRAVENOUS ONCE
Status: COMPLETED | OUTPATIENT
Start: 2023-09-11 | End: 2023-09-11

## 2023-09-11 RX ORDER — INSULIN LISPRO 100 [IU]/ML
4-24 INJECTION, SOLUTION INTRAVENOUS; SUBCUTANEOUS
Status: DISCONTINUED | OUTPATIENT
Start: 2023-09-11 | End: 2023-09-11

## 2023-09-11 RX ORDER — IBUPROFEN 600 MG/1
1 TABLET ORAL
Status: DISCONTINUED | OUTPATIENT
Start: 2023-09-11 | End: 2023-09-11

## 2023-09-11 RX ORDER — IBUPROFEN 600 MG/1
1 TABLET ORAL
Status: DISCONTINUED | OUTPATIENT
Start: 2023-09-11 | End: 2023-09-13 | Stop reason: HOSPADM

## 2023-09-11 RX ORDER — INSULIN LISPRO 100 [IU]/ML
2-7 INJECTION, SOLUTION INTRAVENOUS; SUBCUTANEOUS
Status: DISCONTINUED | OUTPATIENT
Start: 2023-09-11 | End: 2023-09-13 | Stop reason: HOSPADM

## 2023-09-11 RX ORDER — METHYLPREDNISOLONE SODIUM SUCCINATE 40 MG/ML
40 INJECTION, POWDER, LYOPHILIZED, FOR SOLUTION INTRAMUSCULAR; INTRAVENOUS EVERY 8 HOURS
Status: DISCONTINUED | OUTPATIENT
Start: 2023-09-11 | End: 2023-09-12

## 2023-09-11 RX ORDER — POLYETHYLENE GLYCOL 3350 17 G/17G
17 POWDER, FOR SOLUTION ORAL DAILY
Status: DISCONTINUED | OUTPATIENT
Start: 2023-09-11 | End: 2023-09-12

## 2023-09-11 RX ORDER — AMOXICILLIN 250 MG
2 CAPSULE ORAL 2 TIMES DAILY
Status: DISCONTINUED | OUTPATIENT
Start: 2023-09-11 | End: 2023-09-13 | Stop reason: HOSPADM

## 2023-09-11 RX ORDER — DEXTROSE MONOHYDRATE 25 G/50ML
25 INJECTION, SOLUTION INTRAVENOUS
Status: DISCONTINUED | OUTPATIENT
Start: 2023-09-11 | End: 2023-09-11

## 2023-09-11 RX ORDER — INSULIN LISPRO 100 [IU]/ML
2-7 INJECTION, SOLUTION INTRAVENOUS; SUBCUTANEOUS
Status: DISCONTINUED | OUTPATIENT
Start: 2023-09-11 | End: 2023-09-11

## 2023-09-11 RX ORDER — DEXTROSE MONOHYDRATE 25 G/50ML
25 INJECTION, SOLUTION INTRAVENOUS
Status: DISCONTINUED | OUTPATIENT
Start: 2023-09-11 | End: 2023-09-13 | Stop reason: HOSPADM

## 2023-09-11 RX ADMIN — HYDROCODONE BITARTRATE AND ACETAMINOPHEN 1 TABLET: 7.5; 325 TABLET ORAL at 00:16

## 2023-09-11 RX ADMIN — MORPHINE SULFATE 2 MG: 2 INJECTION, SOLUTION INTRAMUSCULAR; INTRAVENOUS at 03:27

## 2023-09-11 RX ADMIN — ONDANSETRON 4 MG: 2 INJECTION INTRAMUSCULAR; INTRAVENOUS at 17:34

## 2023-09-11 RX ADMIN — ASPIRIN 325 MG: 325 TABLET ORAL at 08:09

## 2023-09-11 RX ADMIN — SENNOSIDES AND DOCUSATE SODIUM 2 TABLET: 50; 8.6 TABLET ORAL at 20:28

## 2023-09-11 RX ADMIN — POLYETHYLENE GLYCOL 3350 17 G: 17 POWDER, FOR SOLUTION ORAL at 10:34

## 2023-09-11 RX ADMIN — FAMOTIDINE 20 MG: 10 INJECTION INTRAVENOUS at 08:11

## 2023-09-11 RX ADMIN — ONDANSETRON 4 MG: 2 INJECTION INTRAMUSCULAR; INTRAVENOUS at 10:34

## 2023-09-11 RX ADMIN — Medication 10 ML: at 08:10

## 2023-09-11 RX ADMIN — ONDANSETRON 4 MG: 2 INJECTION INTRAMUSCULAR; INTRAVENOUS at 03:27

## 2023-09-11 RX ADMIN — INSULIN LISPRO 8 UNITS: 100 INJECTION, SOLUTION INTRAVENOUS; SUBCUTANEOUS at 12:49

## 2023-09-11 RX ADMIN — HYDROCODONE BITARTRATE AND ACETAMINOPHEN 1 TABLET: 7.5; 325 TABLET ORAL at 08:08

## 2023-09-11 RX ADMIN — SODIUM CHLORIDE 125 ML/HR: 9 INJECTION, SOLUTION INTRAVENOUS at 05:03

## 2023-09-11 RX ADMIN — Medication 10 ML: at 23:12

## 2023-09-11 RX ADMIN — INSULIN LISPRO 2 UNITS: 100 INJECTION, SOLUTION INTRAVENOUS; SUBCUTANEOUS at 17:34

## 2023-09-11 RX ADMIN — SENNOSIDES AND DOCUSATE SODIUM 2 TABLET: 50; 8.6 TABLET ORAL at 10:34

## 2023-09-11 RX ADMIN — HYDROCODONE BITARTRATE AND ACETAMINOPHEN 1 TABLET: 7.5; 325 TABLET ORAL at 17:34

## 2023-09-11 NOTE — PROGRESS NOTES
"    DAILY PROGRESS NOTE  [facility]    Patient Identification:  Name: Narcisa Hammer  Age: 63 y.o.  Sex: female  :  1960  MRN: [id number]         Primary Care Physician: Provider, No Known    Subjective:  Interval History: Still complains of this constant left lower quadrant pain.  Denies BM.  No fever no chills.  Admits to nausea/occasional emesis    Objective: Seems a little distraught but nontoxic.  Conversational and needs redirection numerous times as we were just talking in circles.  RN present at bedside and case discussed    Scheduled Meds:acetaminophen, 1,000 mg, Oral, Once  aspirin, 325 mg, Oral, Daily  famotidine, 20 mg, Intravenous, Daily  insulin lispro, 4-24 Units, Subcutaneous, 4x Daily AC & at Bedtime  methylPREDNISolone sodium succinate, 40 mg, Intravenous, Q8H  prochlorperazine, 5 mg, Intravenous, Once  sodium chloride, 10 mL, Intravenous, Q12H      Continuous Infusions:     Vital signs in last 24 hours:  Temp:  [97 °F (36.1 °C)-98.7 °F (37.1 °C)] 97.1 °F (36.2 °C)  Heart Rate:  [52-63] 52  Resp:  [16] 16  BP: (138-151)/(62-81) 138/69    Intake/Output:    Intake/Output Summary (Last 24 hours) at 2023 0924  Last data filed at 2023 0812  Gross per 24 hour   Intake 3417.5 ml   Output 2500 ml   Net 917.5 ml       Exam:  /69 (BP Location: Right arm, Patient Position: Lying)   Pulse 52   Temp 97.1 °F (36.2 °C) (Oral)   Resp 16   Ht 172.7 cm (68\")   Wt 106 kg (233 lb 14.5 oz)   SpO2 95%   BMI 35.57 kg/m²     General Appearance:    Alert, cooperative, nontoxic, AAOx3, anxious                         Throat:   Oral mucosa pink and moist                         Lungs:    Clear to auscultation bilaterally, respirations unlabored                          Heart:    Regular rate and rhythm, S1 and S2 normal                  Abdomen:   Overall soft, exam varies based on patient focus and when having a conversation with her palpation can get deeper with no aspects of " rebound and proactive guarding noted                 Extremities: Moving all and ambulating independently, no cyanosis or edema                            Skin:   Skin is warm and dry                  Neurologic:   CNII-XII intact     Data Review:  Labs in chart were reviewed.    Assessment:  Active Hospital Problems    Diagnosis  POA    **Left lower quadrant abdominal pain [R10.32]  Unknown    Type 2 diabetes mellitus, without long-term current use of insulin [E11.9]  Yes    PAD (peripheral artery disease) [I73.9]  Yes    CHRISTIANA (obstructive sleep apnea) [G47.33]  Yes      Resolved Hospital Problems   No resolved problems to display.       Plan:    Appreciate surgical input and assistance and agree that given CT being negative, we are not finding any pathology that needs to be addressed in the gut.  Based on her histories and recent move, at this point in time of tending to favor musculoskeletal and abdominal wall etiologies.  I did empirically pulse this patient was Solu-Medrol 40 mg IV x3 doses and x-ray her hip and lumbar section.  I anticipate the steroids will get good clinical relief and hopefully will further help pin down the exact etiology for her pain.  I am anticipating reactive hyperglycemia due to steroids and I have adjusted her sliding scale to moderate high for now and may need basal insulin pending her trends.   -Okay with as needed Norco but not utilizing IV narcotics unless I can find a source that would warrant that   -Add bowel regimen    I am also becoming more concerned about mentation contributory to some of her complaints.  This patient is excessively focused on things and seems a bit uneasy as well/possible aspects of adjustment disorder influencing or increasing interpretation of pain.  Regardless we will keep searching for an organic cause.    Saline lock fluids    SCDs for prophylaxis     Empiric Pepcid        Addendum -notified by RN about patient noncompliance with sliding  scale/steroids.  Please document the noncompliance.  Patient's obsession about finding a GI issue is a bit concerning and very unusual.  Considering psychiatric consult.  Very clearly counseled patient there is no gut etiology with a negative CT and a reassuring surgical consult.  I do not understand the resistance or the persistent dwelling or trying to self diagnose    Yusuf Amaro MD  9/11/2023  09:24 EDT

## 2023-09-11 NOTE — PLAN OF CARE
"Goal Outcome Evaluation:  Plan of Care Reviewed With: patient        Progress: no change  Outcome Evaluation: VSS, up ad joann, notified MD this afternoon as pt refusing steriods. She refused her insulin this morning as well. Pt upset as her ivf were discontinued. She has had no vomiting this shift. Nurse encouraged pt to take sips of water and eat small bites of food as she has had no vomiting but pt continues to refuse to eat or drink much. Pt states \"she will not take her steriods until fluids are reordered.\"- MD aware. Diabetic educator spoke to pt on the importance of taking her insulin. Pt finally took the afternoon dose. Everytime this nurse goes to check on the pt, pt continues to let this nurse know \"something is wrong with her and more test need to be done.\" MD aware. Pt refuses SCD's. xray completed this shift. stool softeners given but no BM this shift.         "

## 2023-09-11 NOTE — PROGRESS NOTES
Discharge Planning Assessment  [facility]     Patient Name: Narcisa Hammer  MRN: [id number]  Today's Date: 9/11/2023    Admit Date: 9/10/2023    Plan: Home no needs   Discharge Needs Assessment       Row Name 09/11/23 1510       Living Environment    People in Home alone    Current Living Arrangements apartment    Primary Care Provided by self    Provides Primary Care For no one    Family Caregiver if Needed child(neptali), adult    Quality of Family Relationships helpful;involved;supportive    Able to Return to Prior Arrangements yes       Resource/Environmental Concerns    Resource/Environmental Concerns none       Transition Planning    Patient/Family Anticipates Transition to home    Transportation Anticipated family or friend will provide       Discharge Needs Assessment    Equipment Currently Used at Home cane, straight;glucometer;cpap;oxygen                   Discharge Plan       Row Name 09/11/23 1511       Plan    Plan Home no needs    Plan Comments IMM noted. Introduced self and role of CCP. Facesheet verified. PCP is Giorgi Renteria in [place], [place]. Patient stated she just moved here 10 days ago from [place]. Patient lives alone in a first floor apartment..There are no steps in her home. Prior to admmission, patient was independent with ADL's and continued to drive. Patient stated she uses a cane at time. Wears CPAP with O2 (2L) at HS. No hx of home health nor rehab. DC plan is to return to her home. Stated her son will assist as needed. Denies any needs/equipment at this time.                  Continued Care and Services - Admitted Since 9/10/2023    Coordination has not been started for this encounter.          Demographic Summary       Row Name 09/11/23 1509       General Information    Admission Type inpatient    Arrived From home    Required Notices Provided Important Message from Medicare    Reason for Consult discharge planning    Preferred Language English                   Functional Status       Row  Name 09/11/23 1509       Functional Status    Usual Activity Tolerance moderate    Current Activity Tolerance moderate       Functional Status, IADL    Medications independent    Meal Preparation independent    Housekeeping independent    Laundry independent    Shopping independent       Employment/    Employment Status disabled                   Psychosocial       Row Name 09/11/23 1510       Values/Beliefs    Spiritual, Cultural Beliefs, [employer] Practices, Values that Affect Care no       Behavior WDL    Behavior WDL WDL       Speech WDL    Speech WDL WDL       Coping/Stress    Sources of Support adult child(neptali)    Reaction to Health Status adjusting    Understanding of Condition and Treatment partial understanding of treatment       Developmental Stage (Bobbyiksson's)    Developmental Stage Stage 7 (35-65 years/Middle Adulthood) Generativity vs. Stagnation                   Abuse/Neglect       Row Name 09/11/23 1510       Personal Safety    Feels Unsafe at Home or Work/School no    Feels Threatened by Someone no    Does Anyone Try to Keep You From Having Contact with Others or Doing Things Outside Your Home? no    Physical Signs of Abuse Present no                    Juana Reynolds, RN

## 2023-09-11 NOTE — CONSULTS
"Diabetes Education  Assessment/Teaching    Patient Name:  Narcisa Hammer  YOB: 1960  MRN: [id number]  Admit Date:  9/10/2023      Assessment Date:  9/11/2023  Flowsheet Row Most Recent Value   General Information     Referral From: Database  [Rn consult]   Height 172.7 cm (68\")   Height Method Stated   Weight 106 kg (233 lb 14.5 oz)   Weight Method Standing scale   Diabetes History    What type of diabetes do you have? Type 2   Length of Diabetes Diagnosis 10 + years  [Pt states 15 years]   Current DM knowledge fair   Do you test your blood sugar at home? yes   Frequency of checks Continuous with CGM/QID with meter   Meter type Kendall 2/Freestyle Lite glucometer   Who performs the test? self   Typical readings 200s in the am   Education Preferences    Barriers to Learning other (comment)  [None noted]   Assessment Topics    Taking Medication - Assessment Needs education   DM Goals    Taking Medication - Goal 0-7 days from discharge         Flowsheet Row Most Recent Value   DM Education Needs    Meter Has own   Meter Type Freestyle   Medication Other (comment)  [See below]   Healthy Coping Appropriate   Discharge Plan Home, Follow-up with PCP   Motivation Engaged   Teaching Method Explanation, Discussion   Patient Response Verbalized understanding       Other Comments: Per RN, pt not taking inpatient sliding scale insulin as ordered.      Discussed with pt new order for Solumedrol IV and effects of steroids on glucose levels.  Encouraged pt to take scheduled sliding scale as ordered.  Pt informed inpatient sliding scale begins at 150 mg/dL.      Pt states her PCP prior to pt moving had ordered long-acting 15 units daily but pt had not started due to insurance issues.  Pt states AM glucose 200s at home.  Discussed pt would benefit from basal insulin as her past A1c is out of control (11.1% Sept 2020, 10% November 2022).      Pt verbalized understanding.      Electronically signed by:  Adam Murphy, " HOWARD, [facility]  09/11/23 11:34 EDT

## 2023-09-11 NOTE — PLAN OF CARE
"Goal Outcome Evaluation:  Plan of Care Reviewed With: patient        Progress: no change  Outcome Evaluation: ivf, vdg, refuses s/s due to \"her pmd and a nutritionist told her if ill she should not take insulin if under 200\", requested gas x-order obtained and given, requested urine strained for stones as she feels it might be kidney stone, is worried problem might be related to kidney stones/gb/missed hernia/spleen, is concerned that ct or mri may miss something, called aprn who ordered MSO4 and compazine-took the morphine but refused compazine due to the side effects, vomited x1 dk green liquid, last bm was very small on 9/9 per pt         "

## 2023-09-12 ENCOUNTER — APPOINTMENT (OUTPATIENT)
Dept: GENERAL RADIOLOGY | Facility: HOSPITAL | Age: 63
DRG: 392 | End: 2023-09-12
Payer: MEDICARE

## 2023-09-12 LAB
ANION GAP SERPL CALCULATED.3IONS-SCNC: 10.9 MMOL/L (ref 5–15)
BUN SERPL-MCNC: 9 MG/DL (ref 8–23)
BUN/CREAT SERPL: 15 (ref 7–25)
CALCIUM SPEC-SCNC: 9.4 MG/DL (ref 8.6–10.5)
CHLORIDE SERPL-SCNC: 99 MMOL/L (ref 98–107)
CO2 SERPL-SCNC: 24.1 MMOL/L (ref 22–29)
CREAT SERPL-MCNC: 0.6 MG/DL (ref 0.57–1)
DEPRECATED RDW RBC AUTO: 39.8 FL (ref 37–54)
EGFRCR SERPLBLD CKD-EPI 2021: 99.8 ML/MIN/1.73
ERYTHROCYTE [DISTWIDTH] IN BLOOD BY AUTOMATED COUNT: 12.2 % (ref 12.3–15.4)
GLUCOSE BLDC GLUCOMTR-MCNC: 188 MG/DL (ref 70–130)
GLUCOSE BLDC GLUCOMTR-MCNC: 199 MG/DL (ref 70–130)
GLUCOSE BLDC GLUCOMTR-MCNC: 208 MG/DL (ref 70–130)
GLUCOSE BLDC GLUCOMTR-MCNC: 217 MG/DL (ref 70–130)
GLUCOSE SERPL-MCNC: 216 MG/DL (ref 65–99)
HCT VFR BLD AUTO: 35.4 % (ref 34–46.6)
HGB BLD-MCNC: 12.1 G/DL (ref 12–15.9)
MCH RBC QN AUTO: 30.3 PG (ref 26.6–33)
MCHC RBC AUTO-ENTMCNC: 34.2 G/DL (ref 31.5–35.7)
MCV RBC AUTO: 88.5 FL (ref 79–97)
PLATELET # BLD AUTO: 197 10*3/MM3 (ref 140–450)
PMV BLD AUTO: 9.8 FL (ref 6–12)
POTASSIUM SERPL-SCNC: 4 MMOL/L (ref 3.5–5.2)
RBC # BLD AUTO: 4 10*6/MM3 (ref 3.77–5.28)
SODIUM SERPL-SCNC: 134 MMOL/L (ref 136–145)
WBC NRBC COR # BLD: 8.2 10*3/MM3 (ref 3.4–10.8)

## 2023-09-12 PROCEDURE — 80048 BASIC METABOLIC PNL TOTAL CA: CPT | Performed by: NURSE PRACTITIONER

## 2023-09-12 PROCEDURE — 25010000002 ONDANSETRON PER 1 MG: Performed by: NURSE PRACTITIONER

## 2023-09-12 PROCEDURE — 63710000001 INSULIN LISPRO (HUMAN) PER 5 UNITS: Performed by: HOSPITALIST

## 2023-09-12 PROCEDURE — 74018 RADEX ABDOMEN 1 VIEW: CPT

## 2023-09-12 PROCEDURE — 82948 REAGENT STRIP/BLOOD GLUCOSE: CPT

## 2023-09-12 PROCEDURE — 85027 COMPLETE CBC AUTOMATED: CPT | Performed by: NURSE PRACTITIONER

## 2023-09-12 PROCEDURE — 90791 PSYCH DIAGNOSTIC EVALUATION: CPT | Performed by: SOCIAL WORKER

## 2023-09-12 RX ORDER — FAMOTIDINE 10 MG/ML
20 INJECTION, SOLUTION INTRAVENOUS EVERY 12 HOURS SCHEDULED
Status: DISCONTINUED | OUTPATIENT
Start: 2023-09-12 | End: 2023-09-13 | Stop reason: HOSPADM

## 2023-09-12 RX ORDER — PROMETHAZINE HYDROCHLORIDE 25 MG/1
25 TABLET ORAL EVERY 4 HOURS PRN
Status: DISCONTINUED | OUTPATIENT
Start: 2023-09-12 | End: 2023-09-13 | Stop reason: HOSPADM

## 2023-09-12 RX ORDER — POLYETHYLENE GLYCOL 3350 17 G/17G
17 POWDER, FOR SOLUTION ORAL 2 TIMES DAILY
Status: DISCONTINUED | OUTPATIENT
Start: 2023-09-12 | End: 2023-09-13 | Stop reason: HOSPADM

## 2023-09-12 RX ORDER — BISACODYL 10 MG
10 SUPPOSITORY, RECTAL RECTAL DAILY
Status: DISCONTINUED | OUTPATIENT
Start: 2023-09-12 | End: 2023-09-13 | Stop reason: HOSPADM

## 2023-09-12 RX ORDER — SODIUM CHLORIDE 9 MG/ML
100 INJECTION, SOLUTION INTRAVENOUS CONTINUOUS
Status: DISCONTINUED | OUTPATIENT
Start: 2023-09-12 | End: 2023-09-13 | Stop reason: HOSPADM

## 2023-09-12 RX ORDER — DICYCLOMINE HYDROCHLORIDE 10 MG/1
20 CAPSULE ORAL 4 TIMES DAILY
Status: DISCONTINUED | OUTPATIENT
Start: 2023-09-12 | End: 2023-09-13

## 2023-09-12 RX ORDER — TEMAZEPAM 7.5 MG/1
7.5 CAPSULE ORAL NIGHTLY
Status: DISCONTINUED | OUTPATIENT
Start: 2023-09-12 | End: 2023-09-13 | Stop reason: HOSPADM

## 2023-09-12 RX ADMIN — HYDROCODONE BITARTRATE AND ACETAMINOPHEN 1 TABLET: 7.5; 325 TABLET ORAL at 09:24

## 2023-09-12 RX ADMIN — HYDROCODONE BITARTRATE AND ACETAMINOPHEN 1 TABLET: 7.5; 325 TABLET ORAL at 04:55

## 2023-09-12 RX ADMIN — POLYETHYLENE GLYCOL 3350 17 G: 17 POWDER, FOR SOLUTION ORAL at 08:36

## 2023-09-12 RX ADMIN — Medication 10 ML: at 20:27

## 2023-09-12 RX ADMIN — ASPIRIN 325 MG: 325 TABLET ORAL at 08:36

## 2023-09-12 RX ADMIN — HYDROCODONE BITARTRATE AND ACETAMINOPHEN 1 TABLET: 7.5; 325 TABLET ORAL at 00:37

## 2023-09-12 RX ADMIN — INSULIN LISPRO 2 UNITS: 100 INJECTION, SOLUTION INTRAVENOUS; SUBCUTANEOUS at 08:36

## 2023-09-12 RX ADMIN — FAMOTIDINE 20 MG: 10 INJECTION INTRAVENOUS at 09:08

## 2023-09-12 RX ADMIN — Medication 10 ML: at 08:36

## 2023-09-12 RX ADMIN — ONDANSETRON 4 MG: 2 INJECTION INTRAMUSCULAR; INTRAVENOUS at 20:51

## 2023-09-12 RX ADMIN — SENNOSIDES AND DOCUSATE SODIUM 2 TABLET: 50; 8.6 TABLET ORAL at 08:36

## 2023-09-12 RX ADMIN — POLYETHYLENE GLYCOL 3350 17 G: 17 POWDER, FOR SOLUTION ORAL at 20:25

## 2023-09-12 RX ADMIN — INSULIN LISPRO 2 UNITS: 100 INJECTION, SOLUTION INTRAVENOUS; SUBCUTANEOUS at 21:26

## 2023-09-12 RX ADMIN — HYDROCODONE BITARTRATE AND ACETAMINOPHEN 1 TABLET: 7.5; 325 TABLET ORAL at 20:49

## 2023-09-12 RX ADMIN — HYDROCODONE BITARTRATE AND ACETAMINOPHEN 1 TABLET: 7.5; 325 TABLET ORAL at 13:18

## 2023-09-12 RX ADMIN — ONDANSETRON 4 MG: 2 INJECTION INTRAMUSCULAR; INTRAVENOUS at 13:16

## 2023-09-12 RX ADMIN — FAMOTIDINE 20 MG: 10 INJECTION INTRAVENOUS at 20:19

## 2023-09-12 RX ADMIN — SODIUM CHLORIDE 100 ML/HR: 9 INJECTION, SOLUTION INTRAVENOUS at 16:18

## 2023-09-12 RX ADMIN — BISACODYL 10 MG: 10 SUPPOSITORY RECTAL at 04:56

## 2023-09-12 RX ADMIN — INSULIN LISPRO 3 UNITS: 100 INJECTION, SOLUTION INTRAVENOUS; SUBCUTANEOUS at 13:16

## 2023-09-12 RX ADMIN — SENNOSIDES AND DOCUSATE SODIUM 2 TABLET: 50; 8.6 TABLET ORAL at 20:25

## 2023-09-12 RX ADMIN — DICYCLOMINE HYDROCHLORIDE 20 MG: 10 CAPSULE ORAL at 16:18

## 2023-09-12 NOTE — NURSING NOTE
Pt c/o LT quadrant pain hawa to her back but refused to take norco, per pt can't able to urinate bladder scan done 256ml, called to Niya HAYWARD regarding pt complaints ordered lab works today and dulcolax supp.

## 2023-09-12 NOTE — PROGRESS NOTES
Name: Narcisa Hammer ADMIT: 9/10/2023   : 1960  PCP: Provider, No Known    MRN: [id number] LOS: 2 days   AGE/SEX: 63 y.o. female  ROOM: [facility]   Subjective   Chief Complaint   Patient presents with    Abdominal Pain    Nausea     Still with abd pain  Llq  Partial improvement with meds but persistent  Still with nausea  +constipation     Objective   Vital Signs  Temp:  [97 °F (36.1 °C)-97.7 °F (36.5 °C)] 97 °F (36.1 °C)  Heart Rate:  [57-68] 57  Resp:  [16-18] 18  BP: (133-166)/(77-89) 133/80  SpO2:  [98 %-99 %] 98 %  on  Flow (L/min):  [2] 2;   Device (Oxygen Therapy): room air  Body mass index is 35.57 kg/m².    Physical Exam  Constitutional:       General: She is not in acute distress.  HENT:      Head: Normocephalic and atraumatic.   Eyes:      General: No scleral icterus.  Cardiovascular:      Rate and Rhythm: Regular rhythm.      Heart sounds: Normal heart sounds.   Pulmonary:      Effort: Pulmonary effort is normal. No respiratory distress.   Abdominal:      General: There is no distension.      Palpations: Abdomen is soft.      Tenderness: There is abdominal tenderness (minimal llq). There is no guarding.   Musculoskeletal:      Cervical back: Neck supple.   Neurological:      Mental Status: She is alert.   Psychiatric:         Behavior: Behavior normal.   anxious  Benign abdominal exam for the most part, not much tenderness    Results Review:       I reviewed the patient's new clinical results.  Results from last 7 days   Lab Units 23  0459 09/10/23  0617 09/09/23  2231   WBC 10*3/mm3 8.20 7.60 8.81   HEMOGLOBIN g/dL 12.1 12.1 13.6   PLATELETS 10*3/mm3 197 177 218     Results from last 7 days   Lab Units 23  0459 09/10/23  0623  2231   SODIUM mmol/L 134* 136 133*   POTASSIUM mmol/L 4.0 4.5 4.1   CHLORIDE mmol/L 99 102 98   CO2 mmol/L 24.1 24.1 23.0   BUN mg/dL 9 9 11   CREATININE mg/dL 0.60 0.58 0.72   GLUCOSE mg/dL 216* 212* 268*   Estimated Creatinine Clearance: 119.1 mL/min  (by C-G formula based on SCr of 0.6 mg/dL).  Results from last 7 days   Lab Units 09/09/23  2231   ALBUMIN g/dL 4.5   BILIRUBIN mg/dL 0.3   ALK PHOS U/L 141*   AST (SGOT) U/L 16   ALT (SGPT) U/L 20     Results from last 7 days   Lab Units 09/12/23  0459 09/10/23  0617 09/09/23  2231   CALCIUM mg/dL 9.4 8.8 9.8   ALBUMIN g/dL  --   --  4.5     Results from last 7 days   Lab Units 09/10/23  0212 09/09/23  2231   PROCALCITONIN ng/mL  --  0.02   LACTATE mmol/L 1.7 2.3*       Coag     HbA1C   Lab Results   Component Value Date    HGBA1C 11.10 (H) 09/02/2020     Infection   Results from last 7 days   Lab Units 09/10/23  0115 09/10/23  0036 09/09/23  2231   BLOODCX  No growth at 2 days No growth at 2 days  --    PROCALCITONIN ng/mL  --   --  0.02     Radiology(recent) XR Spine Lumbar 2 or 3 View    Result Date: 9/11/2023   As described.    This report was finalized on 9/11/2023 12:30 PM by Dr. Farooq Chatterjee M.D.      XR Hip With or Without Pelvis 2 - 3 View Left    Result Date: 9/11/2023   As described.    This report was finalized on 9/11/2023 12:04 PM by Dr. Farooq Chatterjee M.D.     HS Troponin T   Date Value Ref Range Status   09/10/2023 10 (H) <10 ng/L Final   09/09/2023 10 (H) <10 ng/L Final     No components found for: TSH;2    acetaminophen, 1,000 mg, Oral, Once  aspirin, 325 mg, Oral, Daily  bisacodyl, 10 mg, Rectal, Daily  dicyclomine, 20 mg, Oral, 4x Daily  famotidine, 20 mg, Intravenous, Daily  insulin lispro, 2-7 Units, Subcutaneous, 4x Daily AC & at Bedtime  polyethylene glycol, 17 g, Oral, BID  prochlorperazine, 5 mg, Intravenous, Once  senna-docusate sodium, 2 tablet, Oral, BID  sodium chloride, 10 mL, Intravenous, Q12H  temazepam, 7.5 mg, Oral, Nightly      sodium chloride, 100 mL/hr    Diet: Liquid Diets, Diabetic Diets; Full Liquid; Consistent Carbohydrate; Fluid Consistency: Thin (IDDSI 0)  NPO Diet NPO Type: Strict NPO      Assessment & Plan      Active Hospital Problems    Diagnosis  POA     **Left lower quadrant abdominal pain [R10.32]  Unknown    Type 2 diabetes mellitus, without long-term current use of insulin [E11.9]  Yes    PAD (peripheral artery disease) [I73.9]  Yes    CHRISTIANA (obstructive sleep apnea) [G47.33]  Yes      Resolved Hospital Problems   No resolved problems to display.     63-year-old female who presents with acute onset of 4 days of left lower quadrant abdominal pain associated with nausea.  Work-up thus far has been unremarkable including negative CT, unremarkable labs, pelvic and L-spine imaging without fracture.    Discussed at length with patient as well as son by phone.  I had stated that even though it is less satisfying to have unremarkable work-up thus far, it is better than findings of tumor, bowel obstruction, diverticulitis or other acute medical or surgical issue on testing.  Hard to say exactly what her symptoms are whether related to constipation, viral process, food poisoning or musculoskeletal issue.  We will repeat a KUB as well as add Bentyl, increase stool softeners, restart IV fluids.  We will have a small bowel follow-through tomorrow morning if symptoms persist.  She asked about potential GI consultation for a colonoscopy and I did state that she was seen by the surgical team Dr. Rdz and that she can follow-up in the short-term with Dr. Rdz for colonoscopy and hopefully her symptoms will get better but she can have close outpatient follow-up.    Add low dose temazepam tonight for insomnia       Sage Beaulieu MD  [facility]  09/12/23  15:04 EDT

## 2023-09-12 NOTE — CONSULTS
"[facility] evaluated 63-year-old female for anxiety.  Patient came into the ED a few days ago with lower abdominal pain.  Patient had a CT done with some blood work and nothing was found of significance but she continued in pain.  There was a surgery consult where they did not find anything concerning.  Patient is upset because she feels there is a \"lack of testing\" and that her concerns were being minimized.  Patient denies any psychiatric history and states her anxiety is only increased because she feels she is getting worse and nobody is listening to her.  Patient denies any depressed mood.  Patient states she is not sleeping and in pain.  Patient states she has not been able to urinate today.  Patient is upset that they discontinued her IV fluids.  Patient states she has vomited a couple of times and it was green in color.  Patient denies any SI and denies any history of attempts.  Patient denies any substance use.  Patient states her mother  of colon cancer and patient is concerned that something is wrong with her colon.  Patient states she wants to see a GI specialist.    Patient states her son will try to talk with the doctors to find out what the next steps are.  Patient is not on any psych meds.  Patient states she does not have a PCP here in town as she just moved here within the last couple of weeks.  Patient lives alone in an apartment and has support from her son and daughter-in-law that live in town.  Patient also has 3 other adult children.    Access will sign off.  Access let the nurse know that if our services are needed again to please call.  "

## 2023-09-12 NOTE — PLAN OF CARE
"Goal Outcome Evaluation:  Plan of Care Reviewed With: patient        Progress: no change  Outcome Evaluation: VS stable. Pt refused insulin and steroids, fearful that her blood sugar will elevate. Pt reports pain scale of 10/10 in LLQ. PT states that Norco not effective. Pt reports no urine output since 2100. Bladder scan done showed 256ml. Member is clear diet. Labs drawn this am. Last reported BM was 9/9, dulcolax suppository ordered. Pt refuses SCDs. Pt tearful and upset with \"lack of testing done\". Renée HAYWARD placed an order for the [facility] to see her.         "

## 2023-09-12 NOTE — PLAN OF CARE
Goal Outcome Evaluation:  Plan of Care Reviewed With: patient        Progress: improving  Outcome Evaluation: Went over the plan of care and answered all questions. Vitals stable and pain is better controlled. Patient is up ad joann and eating small amounts of her food. Patient had  two bouts of nausea and meds given no other issues this shift.

## 2023-09-13 ENCOUNTER — READMISSION MANAGEMENT (OUTPATIENT)
Dept: CALL CENTER | Facility: HOSPITAL | Age: 63
End: 2023-09-13
Payer: MEDICARE

## 2023-09-13 VITALS
TEMPERATURE: 97.3 F | HEIGHT: 68 IN | BODY MASS INDEX: 35.45 KG/M2 | SYSTOLIC BLOOD PRESSURE: 124 MMHG | DIASTOLIC BLOOD PRESSURE: 73 MMHG | OXYGEN SATURATION: 97 % | HEART RATE: 59 BPM | RESPIRATION RATE: 16 BRPM | WEIGHT: 233.91 LBS

## 2023-09-13 LAB
ALBUMIN SERPL-MCNC: 3.5 G/DL (ref 3.5–5.2)
ALBUMIN/GLOB SERPL: 1.3 G/DL
ALP SERPL-CCNC: 99 U/L (ref 39–117)
ALT SERPL W P-5'-P-CCNC: 19 U/L (ref 1–33)
ANION GAP SERPL CALCULATED.3IONS-SCNC: 8.5 MMOL/L (ref 5–15)
AST SERPL-CCNC: 16 U/L (ref 1–32)
BILIRUB SERPL-MCNC: 0.5 MG/DL (ref 0–1.2)
BUN SERPL-MCNC: 7 MG/DL (ref 8–23)
BUN/CREAT SERPL: 11.3 (ref 7–25)
CALCIUM SPEC-SCNC: 8.7 MG/DL (ref 8.6–10.5)
CHLORIDE SERPL-SCNC: 101 MMOL/L (ref 98–107)
CO2 SERPL-SCNC: 24.5 MMOL/L (ref 22–29)
CREAT SERPL-MCNC: 0.62 MG/DL (ref 0.57–1)
EGFRCR SERPLBLD CKD-EPI 2021: 99 ML/MIN/1.73
GLOBULIN UR ELPH-MCNC: 2.6 GM/DL
GLUCOSE BLDC GLUCOMTR-MCNC: 184 MG/DL (ref 70–130)
GLUCOSE BLDC GLUCOMTR-MCNC: 206 MG/DL (ref 70–130)
GLUCOSE BLDC GLUCOMTR-MCNC: 214 MG/DL (ref 70–130)
GLUCOSE SERPL-MCNC: 181 MG/DL (ref 65–99)
POTASSIUM SERPL-SCNC: 3.8 MMOL/L (ref 3.5–5.2)
PROT SERPL-MCNC: 6.1 G/DL (ref 6–8.5)
SODIUM SERPL-SCNC: 134 MMOL/L (ref 136–145)
TSH SERPL DL<=0.05 MIU/L-ACNC: 2 UIU/ML (ref 0.27–4.2)

## 2023-09-13 PROCEDURE — 25010000002 ONDANSETRON PER 1 MG: Performed by: NURSE PRACTITIONER

## 2023-09-13 PROCEDURE — 63710000001 INSULIN LISPRO (HUMAN) PER 5 UNITS: Performed by: HOSPITALIST

## 2023-09-13 PROCEDURE — 80053 COMPREHEN METABOLIC PANEL: CPT | Performed by: INTERNAL MEDICINE

## 2023-09-13 PROCEDURE — 84443 ASSAY THYROID STIM HORMONE: CPT | Performed by: INTERNAL MEDICINE

## 2023-09-13 PROCEDURE — 82948 REAGENT STRIP/BLOOD GLUCOSE: CPT

## 2023-09-13 RX ORDER — CYCLOBENZAPRINE HCL 10 MG
5 TABLET ORAL 3 TIMES DAILY
Status: DISCONTINUED | OUTPATIENT
Start: 2023-09-13 | End: 2023-09-13 | Stop reason: HOSPADM

## 2023-09-13 RX ORDER — POLYETHYLENE GLYCOL 3350 17 G/17G
17 POWDER, FOR SOLUTION ORAL 2 TIMES DAILY
Start: 2023-09-13

## 2023-09-13 RX ORDER — NALOXONE HYDROCHLORIDE 4 MG/.1ML
SPRAY NASAL
Qty: 2 EACH | Refills: 0 | Status: SHIPPED | OUTPATIENT
Start: 2023-09-13

## 2023-09-13 RX ORDER — ACETAMINOPHEN 325 MG/1
650 TABLET ORAL EVERY 6 HOURS PRN
Start: 2023-09-13 | End: 2023-09-13 | Stop reason: HOSPADM

## 2023-09-13 RX ORDER — FAMOTIDINE 20 MG/1
20 TABLET, FILM COATED ORAL 2 TIMES DAILY
Qty: 30 TABLET | Refills: 0 | Status: SHIPPED | OUTPATIENT
Start: 2023-09-13

## 2023-09-13 RX ORDER — SUCRALFATE 1 G/1
1 TABLET ORAL
Qty: 60 TABLET | Refills: 0 | Status: SHIPPED | OUTPATIENT
Start: 2023-09-13

## 2023-09-13 RX ORDER — SUCRALFATE 1 G/1
1 TABLET ORAL
Status: DISCONTINUED | OUTPATIENT
Start: 2023-09-13 | End: 2023-09-13 | Stop reason: HOSPADM

## 2023-09-13 RX ORDER — ONDANSETRON 4 MG/1
4 TABLET, FILM COATED ORAL EVERY 6 HOURS PRN
Qty: 15 TABLET | Refills: 0 | Status: SHIPPED | OUTPATIENT
Start: 2023-09-13

## 2023-09-13 RX ORDER — HYDROCODONE BITARTRATE AND ACETAMINOPHEN 5; 325 MG/1; MG/1
1 TABLET ORAL EVERY 6 HOURS PRN
Qty: 3 TABLET | Refills: 0 | Status: SHIPPED | OUTPATIENT
Start: 2023-09-13

## 2023-09-13 RX ORDER — CYCLOBENZAPRINE HCL 5 MG
5 TABLET ORAL 3 TIMES DAILY PRN
Qty: 15 TABLET | Refills: 0 | Status: SHIPPED | OUTPATIENT
Start: 2023-09-13

## 2023-09-13 RX ADMIN — SUCRALFATE 1 G: 1 TABLET ORAL at 17:15

## 2023-09-13 RX ADMIN — CYCLOBENZAPRINE 5 MG: 10 TABLET, FILM COATED ORAL at 17:00

## 2023-09-13 RX ADMIN — SODIUM CHLORIDE 100 ML/HR: 9 INJECTION, SOLUTION INTRAVENOUS at 03:02

## 2023-09-13 RX ADMIN — FAMOTIDINE 20 MG: 10 INJECTION INTRAVENOUS at 09:29

## 2023-09-13 RX ADMIN — INSULIN LISPRO 3 UNITS: 100 INJECTION, SOLUTION INTRAVENOUS; SUBCUTANEOUS at 17:15

## 2023-09-13 RX ADMIN — POLYETHYLENE GLYCOL 3350 17 G: 17 POWDER, FOR SOLUTION ORAL at 09:27

## 2023-09-13 RX ADMIN — CYCLOBENZAPRINE 5 MG: 10 TABLET, FILM COATED ORAL at 10:44

## 2023-09-13 RX ADMIN — ONDANSETRON 4 MG: 2 INJECTION INTRAMUSCULAR; INTRAVENOUS at 06:12

## 2023-09-13 RX ADMIN — Medication 10 ML: at 09:27

## 2023-09-13 RX ADMIN — SENNOSIDES AND DOCUSATE SODIUM 2 TABLET: 50; 8.6 TABLET ORAL at 09:27

## 2023-09-13 RX ADMIN — HYDROCODONE BITARTRATE AND ACETAMINOPHEN 1 TABLET: 7.5; 325 TABLET ORAL at 00:42

## 2023-09-13 RX ADMIN — ASPIRIN 325 MG: 325 TABLET ORAL at 09:27

## 2023-09-13 RX ADMIN — SUCRALFATE 1 G: 1 TABLET ORAL at 10:44

## 2023-09-13 RX ADMIN — BISACODYL 10 MG: 10 SUPPOSITORY RECTAL at 09:27

## 2023-09-13 NOTE — PLAN OF CARE
Goal Outcome Evaluation:  Plan of Care Reviewed With: patient        Progress: improving  Outcome Evaluation: VSS. PO pain medication given with good relief. Nausea medication given with good relief. Up ad joann. NPO maintained after midnight.

## 2023-09-13 NOTE — DISCHARGE SUMMARY
NAME: Narcisa Hammer ADMIT: 9/10/2023   : 1960  PCP: Provider, No Known    MRN: [id number] LOS: 3 days   AGE/SEX: 63 y.o. female  ROOM: 88/1     Date of Admission:  9/10/2023  Date of Discharge:  2023    PCP: Provider, No Known    CHIEF COMPLAINT  Abdominal Pain and Nausea      DISCHARGE DIAGNOSIS  Active Hospital Problems    Diagnosis  POA    **Left lower quadrant abdominal pain [R10.32]  Unknown    Type 2 diabetes mellitus, without long-term current use of insulin [E11.9]  Yes    PAD (peripheral artery disease) [I73.9]  Yes    CHRISTIANA (obstructive sleep apnea) [G47.33]  Yes      Resolved Hospital Problems   No resolved problems to display.       SECONDARY DIAGNOSES  Past Medical History:   Diagnosis Date    Diabetes mellitus     type 2    Hyperlipidemia     CHRISTIANA (obstructive sleep apnea)     PAD (peripheral artery disease)     Peripheral vascular disease of lower extremity     Pulmonary nodule, left     Splenic infarction     UTI (urinary tract infection)        CONSULTS   Surgery     HOSPITAL COURSE  63-year-old female who presents with acute onset of 4 days of left lower quadrant abdominal pain associated with nausea.  Work-up thus far has been unremarkable including negative CT, unremarkable labs, pelvic and L-spine imaging without fracture.     Discussed at length with patient as well as son by phone, daughter in law at bedside.  I had stated that even though it is less satisfying to have unremarkable work-up thus far, it is better than findings of tumor, bowel obstruction, diverticulitis or other acute medical or surgical issue on testing.  Hard to say exactly what her symptoms are whether related to constipation, viral process, food poisoning or musculoskeletal issue.  Repeat KUB negative.  Offered a small bowel follow-through I think the yield would be low with negative CT and KUB.  Further discussion about this with her she states that both after the contrast for her CT scan, as well as  previous oral contrast many years ago she has worsening pain and nausea.  This does make me wonder that perhaps some of her persistent symptoms over the past day or 2 has been related to intolerance and side effects of the contrast.  Thus discussed risk/benefits with her and canceled the order for small bowel follow-through as this may give her additional pain and nausea related to the contrast.    Discussed with nurse this afternoon and she has had now a bowel movement as well is tolerating diet without significant pain or nausea.  Again thankfully her work-up thus far has been negative and her symptoms are improving and she can be discharged on some symptomatic medication with outpatient follow-up with primary care, as well as surgery or GI for colonoscopy with family history of colon cancer.  Patient and daughter-in-law agreed with this plan when discussed this morning.  I have reviewed Derrick and am prescribing just 3 pills of low-dose Norco which she has taken some during this hospitalization just in case she has some moderate pain in the coming day but I discussed with patient and her daughter-in-law that I would prefer her to take Tylenol only for less risk of side effects as well as dependence.  They understood this and agree.        DIAGNOSTICS    09/13/2023 0541 09/13/2023 0637 Comprehensive Metabolic Panel [[id number]]    (Abnormal)   Blood    Final result Component Value Units   Glucose 181 High  mg/dL   BUN 7 Low  mg/dL   Creatinine 0.62 mg/dL   Sodium 134 Low  mmol/L   Potassium 3.8 mmol/L   Chloride 101 mmol/L   CO2 24.5 mmol/L   Calcium 8.7 mg/dL   Total Protein 6.1 g/dL   Albumin 3.5 g/dL   ALT (SGPT) 19 U/L   AST (SGOT) 16 U/L   Alkaline Phosphatase 99 U/L   Total Bilirubin 0.5 mg/dL   Globulin 2.6 gm/dL   A/G Ratio 1.3 g/dL   BUN/Creatinine Ratio 11.3    Anion Gap 8.5 mmol/L   eGFR 99.0 mL/min/1.73          09/13/2023 0541 09/13/2023 0644 TSH [[id number]]   Blood    Final result Component Value  Units   TSH 2.000 uIU/mL          09/12/2023 0459 09/12/2023 0547 CBC (No Diff) [[id number]]   (Abnormal)   Blood    Final result Component Value Units   WBC 8.20 10*3/mm3   RBC 4.00 10*6/mm3   Hemoglobin 12.1 g/dL   Hematocrit 35.4 %   MCV 88.5 fL   MCH 30.3 pg   MCHC 34.2 g/dL   RDW 12.2 Low  %   RDW-SD 39.8 fl   MPV 9.8 fL   Platelets 197 10*3/mm3          09/12/2023 0459 09/12/2023 0606 Basic Metabolic Panel [[id number]]    (Abnormal)   Blood    Final result Component Value Units   Glucose 216 High  mg/dL   BUN 9 mg/dL   Creatinine 0.60 mg/dL   Sodium 134 Low  mmol/L   Potassium 4.0 mmol/L   Chloride 99 mmol/L   CO2 24.1 mmol/L   Calcium 9.4 mg/dL   BUN/Creatinine Ratio 15.0    Anion Gap 10.9 mmol/L   eGFR 99.8 mL/min/1.73          09/10/2023 0617 09/10/2023 0724 Basic Metabolic Panel [[id number]]    (Abnormal)   Blood    Final result Component Value Units   Glucose 212 High  mg/dL   BUN 9 mg/dL   Creatinine 0.58 mg/dL   Sodium 136 mmol/L   Potassium 4.5  mmol/L   Chloride 102 mmol/L   CO2 24.1 mmol/L   Calcium 8.8 mg/dL   BUN/Creatinine Ratio 15.5    Anion Gap 9.9 mmol/L   eGFR 100.6 mL/min/1.73          09/10/2023 0617 09/10/2023 0702 CBC (No Diff) [[id number]]   Blood    Final result Component Value Units   WBC 7.60 10*3/mm3   RBC 4.10 10*6/mm3   Hemoglobin 12.1 g/dL   Hematocrit 36.5 %   MCV 89.0 fL   MCH 29.5 pg   MCHC 33.2 g/dL   RDW 12.3 %   RDW-SD 40.6 fl   MPV 9.9 fL   Platelets 177 10*3/mm3          09/10/2023 0212 09/10/2023 0239 STAT Lactic Acid, Reflex [[id number]]   Blood    Final result Component Value Units   Lactate 1.7 mmol/L          09/10/2023 0212 09/10/2023 0240 High Sensitivity Troponin T 2Hr [[id number]]    (Abnormal)   Blood    Final result Component Value Units   HS Troponin T 10 High  ng/L   Troponin T Delta 0 ng/L          09/10/2023 0115 09/13/2023 0130 Blood Culture - Blood, Arm, Right [[id number]]   Blood from Arm, Right    Preliminary result Component Value   Blood Culture No growth at 3  days P             09/10/2023 0036 09/13/2023 0045 Blood Culture - Blood, Arm, Right [[id number]]   Blood from Arm, Right    Preliminary result Component Value   Blood Culture No growth at 3 days P             09/09/2023 2241 09/09/2023 2251 Urinalysis With Microscopic If Indicated (No Culture) - Urine, Clean Catch [[id number]]    (Abnormal)   Urine, Clean Catch    Final result Component Value   Color, UA Yellow   Appearance, UA Clear   pH, UA 6.0   Specific Gravity, UA 1.017   Glucose,  mg/dL (1+) Abnormal    Ketones, UA Trace Abnormal    Bilirubin, UA Negative   Blood, UA Negative   Protein, UA Negative   Leuk Esterase, UA Negative   Nitrite, UA Negative   Urobilinogen, UA 0.2 E.U./dL          09/09/2023 2231 09/09/2023 2345 [place] Draw [[id number]]    Blood    Final result Component Value   No component results          09/09/2023 2231 09/09/2023 2301 Comprehensive Metabolic Panel [[id number]]    (Abnormal)   Blood    Final result Component Value Units   Glucose 268 High  mg/dL   BUN 11 mg/dL   Creatinine 0.72 mg/dL   Sodium 133 Low  mmol/L   Potassium 4.1 mmol/L   Chloride 98 mmol/L   CO2 23.0 mmol/L   Calcium 9.8 mg/dL   Total Protein 7.2 g/dL   Albumin 4.5 g/dL   ALT (SGPT) 20 U/L   AST (SGOT) 16 U/L   Alkaline Phosphatase 141 High  U/L   Total Bilirubin 0.3 mg/dL   Globulin 2.7 gm/dL   A/G Ratio 1.7 g/dL   BUN/Creatinine Ratio 15.3    Anion Gap 12.0 mmol/L   eGFR 92.9 mL/min/1.73          09/09/2023 2231 09/09/2023 2301 Lipase [[id number]]   (Abnormal)   Blood    Final result Component Value Units   Lipase 12 Low  U/L          09/09/2023 2231 09/09/2023 2308 Lactic Acid, Plasma [[id number]]   (Abnormal)   Blood    Final result Component Value Units   Lactate 2.3 High Critical  mmol/L          09/09/2023 2231 09/09/2023 2242 CBC Auto Differential [[id number]]   (Abnormal)   Blood    Final result Component Value Units   WBC 8.81 10*3/mm3   RBC 4.56 10*6/mm3   Hemoglobin 13.6 g/dL   Hematocrit 41.4 %   MCV 90.8  fL   MCH 29.8 pg   MCHC 32.9 g/dL   RDW 12.6 %   RDW-SD 41.7 fl   MPV 9.9 fL   Platelets 218 10*3/mm3   Neutrophil % 73.8 %   Lymphocyte % 19.1 Low  %   Monocyte % 5.6 %   Eosinophil % 0.7 %   Basophil % 0.3 %   Immature Grans % 0.5 %   Neutrophils, Absolute 6.51 10*3/mm3   Lymphocytes, Absolute 1.68 10*3/mm3   Monocytes, Absolute 0.49 10*3/mm3   Eosinophils, Absolute 0.06 10*3/mm3   Basophils, Absolute 0.03 10*3/mm3   Immature Grans, Absolute 0.04 10*3/mm3   nRBC 0.0 /100 WBC          09/09/2023 2231 09/10/2023 0202 Procalcitonin [[id number]]    Blood    Final result Component Value Units   Procalcitonin 0.02 ng/mL          09/09/2023 2231 09/10/2023 0147 High Sensitivity Troponin T [[id number]]    (Abnormal)   Blood    Final result Component Value Units   HS Troponin T 10 High  ng/L         XR Abdomen KUB [[id number]] Idris as Reviewed   Order Status: Completed Collected: 09/12/23 1857    Updated: 09/12/23 2016   Narrative:     KUB     HISTORY: Nausea and vomiting. Abdomen pain.     TECHNIQUE: 3 x-rays of the abdomen and pelvis are provided. No prior  imaging for correlation.     FINDINGS: Visualized lung bases are clear and lower mediastinal  structures appear normal. The bowel gas pattern is normal. Gas is  observed to the level of the rectum. No calculus or osseous lesion.      Impression:     Negative.     This report was finalized on 9/12/2023 8:13 PM by Dr. Champ Norwood M.D.       XR Spine Lumbar 2 or 3 View [[id number]] Idris as Reviewed   Order Status: Completed Collected: 09/11/23 1228    Updated: 09/11/23 1233   Narrative:     XR SPINE LUMBAR 2 OR 3 VW-     INDICATIONS: Pain        TECHNIQUE: 3 VIEWS OF THE LUMBAR SPINE     COMPARISON: None available     FINDINGS:  Disc space narrowing is seen at L5/S1. Vertebral body heights appear  preserved. No acute fracture is identified. If there is further clinical  concern, MRI could be considered for further evaluation.      Impression:        As described.            This report was finalized on 9/11/2023 12:30 PM by Dr. Farooq Chatterjee M.D.       XR Hip With or Without Pelvis 2 - 3 View Left [[id number]] Idris as Reviewed   Order Status: Completed Collected: 09/11/23 1203    Updated: 09/11/23 1207   Narrative:     XR HIP W OR WO PELVIS 2-3 VIEW LEFT-     INDICATIONS: Pain     TECHNIQUE: FRONTAL VIEW OF THE PELVIS, 2 VIEWS OF THE LEFT HIP     COMPARISON: None available     FINDINGS:     No acute fracture, erosion, or dislocation is identified. The joint  spaces appear preserved. Degenerative changes seen at the symphysis  pubis. Follow-up/further evaluation can be obtained ossifications  persist.      Impression:        As described.           This report was finalized on 9/11/2023 12:04 PM by Dr. Farooq Chatterjee M.D.       CT Angiogram Abdomen Pelvis [[id number]] Idris as Reviewed   Order Status: Completed Collected: 09/10/23 0213    Updated: 09/10/23 0213   Narrative:       Patient: RAN CALLEJAS  Time Out: 02:12  Exam(s): CTA ABDOMEN + PELVIS    EXAM:    CT Angiography Abdomen and Pelvis With Intravenous Contrast    CLINICAL HISTORY:     Reason for exam: Left sided abdominal pain and flank pain. Patient has  had a history of a splenic infarct seen on CT in 2020..    TECHNIQUE:    Axial computed tomographic angiography images of the abdomen and pelvis  with intravenous contrast.  CTDI is 24.68 mGy and DLP is 1362 mGy-cm.    This CT exam was performed according to the principle of ALARA (As Low As  Reasonably Achievable) by using one or more of the following dose  reduction techniques: automated exposure control, adjustment of the mA  and or kV according to patient size, and or use of iterative  reconstruction technique.    3D and MIP reconstructed images were created and reviewed.    COMPARISON:    No relevant prior studies available.    FINDINGS:     VASCULATURE:    Aorta:  No acute findings.  No abdominal aortic aneurysm.  No  dissection.    Celiac trunk and  "mesenteric arteries:  No acute findings.  No occlusion  or significant stenosis.    Renal arteries:  No acute findings.  No occlusion or significant  stenosis.    Iliac arteries:  No acute findings.  No occlusion or significant  stenosis.      Lung bases:  Unremarkable.  No mass.  No consolidation.     ABDOMEN:    Liver:  Unremarkable.  No mass.    Gallbladder and bile ducts:  Unremarkable.  No calcified stones.  No  ductal dilation.    Pancreas:  Unremarkable.  No ductal dilation.  No mass.    Spleen:  Normal appearance of the spleen.    Adrenals:  Unremarkable.  No mass.    Kidneys and ureters:  Unremarkable.  No hydronephrosis.  No solid mass.    Stomach and bowel:  Unremarkable.  No obstruction.  No mucosal  thickening.     PELVIS:    Appendix:  No findings to suggest acute appendicitis.    Bladder:  Unremarkable.  No mass.    Reproductive:  Unremarkable as visualized.     ABDOMEN and PELVIS:    Intraperitoneal space:  Unremarkable.  No significant fluid collection.   No free air.    Bones joints:  No acute fracture.  No dislocation.    Soft tissues:  Unremarkable.    Lymph nodes:  Unremarkable.  No enlarged lymph nodes.    IMPRESSION:  No acute abnormality.   Impression:                PHYSICAL EXAM  Objective:  Vital signs: (most recent): Blood pressure 124/73, pulse 59, temperature 97.3 °F (36.3 °C), temperature source Oral, resp. rate 16, height 172.7 cm (68\"), weight 106 kg (233 lb 14.5 oz), SpO2 97 %.              Alert  nad  No resp distress  Soft, nt    CONDITION ON DISCHARGE  Stable.      DISCHARGE DISPOSITION   Home or Self Care      DISCHARGE MEDICATIONS       Your medication list        START taking these medications        Instructions Last Dose Given Next Dose Due   cyclobenzaprine 5 MG tablet  Commonly known as: FLEXERIL      Take 1 tablet by mouth 3 (Three) Times a Day As Needed for Muscle Spasms.       famotidine 20 MG tablet  Commonly known as: Pepcid      Take 1 tablet by mouth 2 (Two) Times a " "Day.       HYDROcodone-acetaminophen 5-325 MG per tablet  Commonly known as: Norco      Take 1 tablet by mouth Every 6 (Six) Hours As Needed for Moderate Pain.       naloxone 4 MG/0.1ML nasal spray  Commonly known as: NARCAN      Call 911. Don't prime. [place] in 1 nostril for overdose. Repeat in 2-3 minutes in other nostril if no or minimal breathing/responsiveness.       ondansetron 4 MG tablet  Commonly known as: ZOFRAN      Take 1 tablet by mouth Every 6 (Six) Hours As Needed for Nausea or Vomiting.       polyethylene glycol 17 g packet  Commonly known as: MIRALAX      Take 17 g by mouth 2 (Two) Times a Day.       sucralfate 1 g tablet  Commonly known as: CARAFATE      Take 1 tablet by mouth 4 (Four) Times a Day Before Meals & at Bedtime.              CHANGE how you take these medications        Instructions Last Dose Given Next Dose Due   aspirin 325 MG tablet  What changed: Another medication with the same name was removed. Continue taking this medication, and follow the directions you see here.      Take 1 tablet by mouth Daily.              CONTINUE taking these medications        Instructions Last Dose Given Next Dose Due   acetaminophen 500 MG tablet  Commonly known as: TYLENOL      Take 1 tablet by mouth Every 6 (Six) Hours As Needed for Mild Pain.       Alcohol Pads 70 % pads      30 pads Daily.       atorvastatin 40 MG tablet  Commonly known as: Lipitor      Take 1 tablet by mouth Daily.       freestyle lancets      1 each by Other route Every Morning. Use as instructed       FreeStyle Lite device      1 Device Every Morning.       glucose blood test strip  Commonly known as: FREESTYLE LITE      1 each by Other route Every Morning. Use as instructed, E11.9       insulin aspart 100 UNIT/ML solution pen-injector sc pen  Commonly known as: novoLOG FLEXPEN      Inject  under the skin into the appropriate area as directed 3 (Three) Times a Day With Meals.       Insulin Syringe 31G X 5/16\" 0.3 ML misc      " Inject 1 each under the skin into the appropriate area as directed Every Morning.       multivitamin with minerals tablet tablet      Take 1 tablet by mouth Daily.       NON FORMULARY      Take 1 tablet by mouth 3 (Three) Times a Day. Melavic supplement for DM       Pen Needles 32G X 4 MM misc      Inject 1 each under the skin into the appropriate area as directed Every Morning.              STOP taking these medications      oxyCODONE-acetaminophen 5-325 MG per tablet  Commonly known as: PERCOCET                  Where to Get Your Medications        These medications were sent to [facility]/pharmacy #[id number] - [place], [place] - [address] - [phone]  - [phone]   [address] [place] [id number]      Phone: [phone]   cyclobenzaprine 5 MG tablet  famotidine 20 MG tablet  HYDROcodone-acetaminophen 5-325 MG per tablet  naloxone 4 MG/0.1ML nasal spray  ondansetron 4 MG tablet  sucralfate 1 g tablet       Information about where to get these medications is not yet available    Ask your nurse or doctor about these medications  polyethylene glycol 17 g packet        No future appointments.  Additional Instructions for the Follow-ups that You Need to Schedule       Discharge Follow-up with Specialty: PCP in 1 week. GI or Surgery (Dr. Rdz saw you in the hospital) call for appointment for symptoms as well as to schedule colonoscopy   As directed      Specialty: PCP in 1 week. GI or Surgery (Dr. Rdz saw you in the hospital) call for appointment for symptoms as well as to schedule colonoscopy               Follow-up Information       Provider, No Known .    Contact information:  [facility] [address] [phone]                             TEST  RESULTS PENDING AT DISCHARGE  Pending Labs       Order Current Status    Blood Culture - Blood, Arm, Right Preliminary result    Blood Culture - Blood, Arm, Right Preliminary result               Sage Beaulieu,  MD [facility]  09/13/23  14:26 EDT      Time: greater than 32 minutes on discharge  It was a pleasure taking care of this patient while in the hospital.

## 2023-09-14 NOTE — OUTREACH NOTE
Prep Survey      Flowsheet Row Responses   [facility] facility patient discharged from? [facility]   Is LACE score < 7 ? No   Eligibility Readm Mgmt   Discharge diagnosis LLQ abdominal pain   Does the patient have one of the following disease processes/diagnoses(primary or secondary)? Other   Does the patient have Home health ordered? No   Is there a DME ordered? No   Prep survey completed? Yes            Leti FISH - Registered Nurse

## 2023-09-14 NOTE — CASE MANAGEMENT/SOCIAL WORK
Case Management Discharge Note      Final Note: DC'd home. Fili FISH RN         Selected Continued Care - Discharged on 9/13/2023 Admission date: 9/10/2023 - Discharge disposition: Home or Self Care      Destination    No services have been selected for the patient.                Durable Medical Equipment    No services have been selected for the patient.                Dialysis/Infusion    No services have been selected for the patient.                Home Medical Care    No services have been selected for the patient.                Therapy    No services have been selected for the patient.                Community Resources    No services have been selected for the patient.                Community & DME    No services have been selected for the patient.                    Transportation Services  Private: Car    Final Discharge Disposition Code: 01 - home or self-care

## 2023-09-15 LAB
BACTERIA SPEC AEROBE CULT: NORMAL
BACTERIA SPEC AEROBE CULT: NORMAL

## 2023-09-19 ENCOUNTER — READMISSION MANAGEMENT (OUTPATIENT)
Dept: CALL CENTER | Facility: HOSPITAL | Age: 63
End: 2023-09-19
Payer: MEDICARE

## 2023-09-19 NOTE — OUTREACH NOTE
Medical Week 1 Survey      Flowsheet Row Responses   [facility] [place] patient discharged from? [facility]   Does the patient have one of the following disease processes/diagnoses(primary or secondary)? Other   Week 1 attempt successful? Yes   Call start time 1307   Call end time 1321   Discharge diagnosis LLQ abdominal pain, [id number]   Person spoke with today (if not patient) and relationship patient   Meds reviewed with patient/caregiver? Yes   Does the patient have all medications ordered at discharge? Yes   Is the patient taking all medications as directed (includes completed medication regime)? Yes   Does the patient have a primary care provider?  No   PCP Nursing Intervention Assisted patient with PCP selection   Comments regarding PCP Patient will call the [facility] and try to set up a new patient PCP appt.   Has the patient kept scheduled appointments due by today? N/A   Has home health visited the patient within 72 hours of discharge? N/A   Psychosocial issues? No   Did the patient receive a copy of their discharge instructions? Yes   Nursing interventions Reviewed instructions with patient   What is the patient's perception of their health status since discharge? Improving  [Patient reports that she got improvement after visiting chiropractor. ]   Is the patient/caregiver able to teach back signs and symptoms related to disease process for when to call 911? Yes   Is the patient/caregiver able to teach back the hierarchy of who to call/visit for symptoms/problems? PCP, Specialist, Home health nurse, Urgent Care, ED, 911 Yes   If the patient is a current smoker, are they able to teach back resources for cessation? Not a smoker   Week 1 call completed? Yes   Revoked No further contact(revokes)-requires comment   Would this patient benefit from a Referral to Amb Social Work? No   Is the patient interested in additional calls from an ambulatory ? No   Graduated/Revoked comments Declines need  for further f/u nursing calls. Encouraged to call 24hr nurseline for further care advice.   Call end time 1321            TRUE CARR - Registered Nurse

## 2025-03-06 ENCOUNTER — TRANSCRIBE ORDERS (OUTPATIENT)
Dept: ADMINISTRATIVE | Facility: HOSPITAL | Age: 65
End: 2025-03-06
Payer: MEDICARE

## 2025-03-06 DIAGNOSIS — R91.1 PULMONARY NODULE: Primary | ICD-10-CM

## 2025-03-10 ENCOUNTER — HOSPITAL ENCOUNTER (OUTPATIENT)
Facility: HOSPITAL | Age: 65
Discharge: HOME | End: 2025-03-10
Admitting: NURSE PRACTITIONER
Payer: MEDICARE

## 2025-03-10 DIAGNOSIS — R91.1 PULMONARY NODULE: ICD-10-CM

## 2025-03-10 PROCEDURE — 71250 CT THORAX DX C-: CPT

## 2025-04-04 ENCOUNTER — PATIENT ROUNDING (BHMG ONLY) (OUTPATIENT)
Dept: INTERNAL MEDICINE | Facility: CLINIC | Age: 65
End: 2025-04-04
Payer: MEDICARE

## 2025-04-04 ENCOUNTER — OFFICE VISIT (OUTPATIENT)
Dept: INTERNAL MEDICINE | Facility: CLINIC | Age: 65
End: 2025-04-04
Payer: MEDICARE

## 2025-04-04 VITALS
HEIGHT: 68 IN | BODY MASS INDEX: 37.22 KG/M2 | DIASTOLIC BLOOD PRESSURE: 70 MMHG | SYSTOLIC BLOOD PRESSURE: 128 MMHG | WEIGHT: 245.6 LBS | OXYGEN SATURATION: 97 % | HEART RATE: 74 BPM

## 2025-04-04 DIAGNOSIS — D73.5 SPLENIC INFARCTION: ICD-10-CM

## 2025-04-04 DIAGNOSIS — E78.5 HYPERLIPIDEMIA, UNSPECIFIED HYPERLIPIDEMIA TYPE: ICD-10-CM

## 2025-04-04 DIAGNOSIS — E11.65 TYPE 2 DIABETES MELLITUS WITH HYPERGLYCEMIA, WITH LONG-TERM CURRENT USE OF INSULIN: Primary | ICD-10-CM

## 2025-04-04 DIAGNOSIS — Z11.59 NEED FOR HEPATITIS C SCREENING TEST: ICD-10-CM

## 2025-04-04 DIAGNOSIS — Z79.4 TYPE 2 DIABETES MELLITUS WITH HYPERGLYCEMIA, WITH LONG-TERM CURRENT USE OF INSULIN: Primary | ICD-10-CM

## 2025-04-04 DIAGNOSIS — G47.33 OSA (OBSTRUCTIVE SLEEP APNEA): ICD-10-CM

## 2025-04-04 PROBLEM — I10 HYPERTENSION: Status: ACTIVE | Noted: 2025-04-04

## 2025-04-04 RX ORDER — CALCIUM CARB/VITAMIN D3/VIT K1 500-100-40
1 TABLET,CHEWABLE ORAL EVERY MORNING
Qty: 100 EACH | Refills: 3 | Status: SHIPPED | OUTPATIENT
Start: 2025-04-04

## 2025-04-04 NOTE — PROGRESS NOTES
April 4, 2025    Hello, may I speak with Narcisa Harman?    My name is DEA       I am  with [facility]  [address]  [place] [address].    Before we get started may I verify your date of birth? 1960    I am calling to officially welcome you to our practice and ask about your recent visit. Is this a good time to talk? yes    Tell me about your visit with us. What things went well?  GOOD VISIT - EVERYTHING WENT GREAT        We're always looking for ways to make our patients' experiences even better. Do you have recommendations on ways we may improve?  no    Overall were you satisfied with your first visit to our practice? yes       I appreciate you taking the time to speak with me today. Is there anything else I can do for you? no      Thank you, and have a great day.

## 2025-04-04 NOTE — PROGRESS NOTES
New Patient Office Visit      Patient Name: Narcisa Hammer  : 1960   MRN: [id number]   Care Team: Patient Care Team:  Woo Marks MD as PCP - General (Internal Medicine)  Meliton Arellano MD as Referring Physician (Hospitalist)  Keisha Langston MD PhD as Consulting Physician (Hematology and Oncology)    Chief Complaint:    Chief Complaint   Patient presents with    Establish Care       History of Present Illness: Narcisa Hammer is a 64 y.o. female who is here today to establish care.      She recently moved here from [place] however she was receiving primary care here in [place] at the facility she lives at which she states provides medical care as she was seeing a nurse practitioner.    She has no specific or acute complaints or concerns other than management of chronic medical conditions.  Medical history is notable for type 2 diabetes in which she takes mealtime insulin.  She states that she did not tolerate metformin and has not tried any other oral options, she states she also would like to avoid long-acting insulin and was told recently her A1c was about 10% but she improved it half a percentage with diet alone.     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  She states she was told she had an abnormal echo in the past          Subjective      Review of Systems:   Review of Systems - See HPI    Past Medical History:   Past Medical History:   Diagnosis Date    Allergic     Penniciilen sulphur clindamycin    Deep vein thrombosis     Spleen infarction    Diabetes mellitus     type 2    History of medical problems     Cpap and oxygen at night sleep apnea    Hyperlipidemia     Neuromuscular disorder     Neuropathy feet    CHRISTIANA (obstructive sleep apnea)     PAD (peripheral artery disease)     Peripheral vascular disease of lower extremity      Pulmonary nodule, left     Splenic infarction     UTI (urinary tract infection)        Past Surgical History:   Past Surgical History:   Procedure Laterality Date    COLONOSCOPY         Family History:   Family History   Problem Relation Age of Onset    Hypertension Mother     Diabetes Mother     Depression Mother     Cancer Mother     Alcohol abuse Mother     COPD Father         Mesothelioma from job    Arthritis Father     Asthma Daughter        Social History:   Social History     Socioeconomic History    Marital status: Single   Tobacco Use    Smoking status: Former     Current packs/day: 0.00     Average packs/day: 0.3 packs/day for 2.0 years (0.5 ttl pk-yrs)     Types: Cigarettes     Quit date: 2002     Years since quittin.0     Passive exposure: Past    Smokeless tobacco: Never    Tobacco comments:     Only smoked briefly for a year  a pack   Vaping Use    Vaping status: Never Used   Substance and Sexual Activity    Alcohol use: Never    Drug use: Never    Sexual activity: Not Currently     Partners: Male       Tobacco History:   Social History     Tobacco Use   Smoking Status Former    Current packs/day: 0.00    Average packs/day: 0.3 packs/day for 2.0 years (0.5 ttl pk-yrs)    Types: Cigarettes    Quit date: 2002    Years since quittin.0    Passive exposure: Past   Smokeless Tobacco Never   Tobacco Comments    Only smoked briefly for a year  a pack       Medications:     Current Outpatient Medications:     Alcohol Swabs (ALCOHOL PADS) 70 % pads, 30 pads Daily., Disp: 30 each, Rfl: 3    aspirin 325 MG tablet, Take 1 tablet by mouth Daily., Disp: , Rfl:     Blood Glucose Monitoring Suppl (FREESTYLE LITE) device, 1 Device Every Morning., Disp: 1 each, Rfl: 0    glucose blood (FREESTYLE LITE) test strip, 1 each by Other route Every Morning. Use as instructed, E11.9, Disp: 100 each, Rfl: 12    insulin aspart (novoLOG FLEXPEN) 100 UNIT/ML solution pen-injector sc pen, Inject  under the  "skin into the appropriate area as directed 3 (Three) Times a Day With Meals., Disp: , Rfl:     Insulin Pen Needle (PEN NEEDLES) 32G X 4 MM misc, Inject 1 each under the skin into the appropriate area as directed Every Morning., Disp: 100 each, Rfl: 3    Insulin Syringe 31G X 5/16\" 0.3 ML misc, Inject 1 each under the skin into the appropriate area as directed Every Morning. Please, Disp: 100 each, Rfl: 3    Lancets (FREESTYLE) lancets, 1 each by Other route Every Morning. Use as instructed, Disp: 100 each, Rfl: 12    Multiple Vitamins-Minerals (MULTIVITAMIN ADULT PO), Take 1 tablet by mouth Daily., Disp: , Rfl:     Allergies:   Allergies   Allergen Reactions    Dilaudid [Hydromorphone Hcl] Nausea And Vomiting    Doxycycline Hives    Sulfa Antibiotics Hives    Penicillins Unknown - Low Severity       Objective     Physical Exam:  Vital Signs:   Vitals:    04/04/25 0854   BP: 128/70   Pulse: 74   SpO2: 97%   Weight: 111 kg (245 lb 9.6 oz)   Height: 172.7 cm (68\")     Body mass index is 37.34 kg/m².     Physical Exam  Vitals reviewed.   Constitutional:       General: She is not in acute distress.     Appearance: Normal appearance. She is obese. She is not toxic-appearing.   HENT:      Head: Normocephalic and atraumatic.   Eyes:      General: No scleral icterus.     Conjunctiva/sclera: Conjunctivae normal.   Skin:     General: Skin is warm and dry.   Neurological:      Mental Status: She is alert and oriented to person, place, and time.   Psychiatric:         Mood and Affect: Mood normal.         Behavior: Behavior normal.         Assessment / Plan      Assessment/Plan:   Problems Addressed This Visit  Diagnoses and all orders for this visit:    1. Type 2 diabetes mellitus with hyperglycemia, with long-term current use of insulin (Primary)  -     Insulin Syringe 31G X 5/16\" 0.3 ML misc; Inject 1 each under the skin into the appropriate area as directed Every Morning. Please  Dispense: 100 each; Refill: 3  -     " Hemoglobin A1c; Future  -     Lipid Panel With / Chol / HDL Ratio; Future  -     Comprehensive Metabolic Panel; Future  -     Microalbumin / Creatinine Urine Ratio - Urine, Clean Catch; Future  -     Ambulatory Referral to Endocrinology    2. Hyperlipidemia, unspecified hyperlipidemia type    3. Need for hepatitis C screening test  -     Hepatitis C Antibody; Future    4. CHRISTIANA (obstructive sleep apnea)  -     Ambulatory Referral to Sleep Medicine      Reviewed previous hospitalizations dating back to 2020, labs from 2023    Suspect is diabetes is still not adequately controlled, currently only on prandial insulin and denies any long-acting insulin or oral options. I have provided refills for her insulin syringes however given her use of CGM, and hesitancy to consider long-acting I would like her to establish with endocrinoology to discuss other options. 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 Will need to discuss this at next appt     RTC in about 6 weeks, labs prior    Plan of care reviewed with patient at the conclusion of today's visit. Education was provided regarding diagnosis and management.  Patient verbalizes understanding of and agreement with management plan.      Follow Up:   Return in about 6 weeks (around 5/16/2025) for Lab before FU.          Woo Marks MD  [facility]  [address] [place] [phone]  Fax [phone]

## 2025-04-18 ENCOUNTER — OFFICE VISIT (OUTPATIENT)
Age: 65
End: 2025-04-18
Payer: MEDICARE

## 2025-04-18 VITALS
BODY MASS INDEX: 36.86 KG/M2 | SYSTOLIC BLOOD PRESSURE: 142 MMHG | HEIGHT: 68 IN | HEART RATE: 65 BPM | WEIGHT: 243.2 LBS | DIASTOLIC BLOOD PRESSURE: 82 MMHG

## 2025-04-18 DIAGNOSIS — G89.29 CHRONIC CHEST PAIN WITH HIGH RISK FOR CAD: Primary | ICD-10-CM

## 2025-04-18 DIAGNOSIS — Z91.89 CHRONIC CHEST PAIN WITH HIGH RISK FOR CAD: Primary | ICD-10-CM

## 2025-04-18 DIAGNOSIS — R07.9 CHRONIC CHEST PAIN WITH HIGH RISK FOR CAD: Primary | ICD-10-CM

## 2025-04-18 PROCEDURE — 1159F MED LIST DOCD IN RCRD: CPT | Performed by: INTERNAL MEDICINE

## 2025-04-18 PROCEDURE — 93000 ELECTROCARDIOGRAM COMPLETE: CPT | Performed by: INTERNAL MEDICINE

## 2025-04-18 PROCEDURE — 3079F DIAST BP 80-89 MM HG: CPT | Performed by: INTERNAL MEDICINE

## 2025-04-18 PROCEDURE — 1160F RVW MEDS BY RX/DR IN RCRD: CPT | Performed by: INTERNAL MEDICINE

## 2025-04-18 PROCEDURE — 3077F SYST BP >= 140 MM HG: CPT | Performed by: INTERNAL MEDICINE

## 2025-04-18 PROCEDURE — 99204 OFFICE O/P NEW MOD 45 MIN: CPT | Performed by: INTERNAL MEDICINE

## 2025-04-18 NOTE — PROGRESS NOTES
Subjective:     Encounter Date:04/18/2025      Patient ID: Narcisa Hammer is a 64 y.o. female.    Chief Complaint: Chest pain  HPI:   This is a 64-year-old woman who present for evaluation.  I saw her in 2019 when she was hospitalized with a splenic artery embolism of unknown etiology.  She was anticoagulated for some time but has been discontinued in the interim.  She is also a diabetic on insulin for the last 20 years or so.  About a month ago she had an episode overnight she woke up with what she thought was a nightmare.  She is unclear whether she was wearing her sleep mask with oxygen at that time however she had severe hypertension with lasted about 3 days.  Since then she has been very fatigued.  She has been having a gnawing sensation in her breast on the left arm.  Her blood pressure has now normalized.  She has a lot of questions related to diet which is somewhat distracting however in the end this is her primary complaint.  She had an echocardiogram performed with her primary care at an outside facility which showed normal LV systolic and diastolic function with mild AI and MR.  This is essentially unchanged from her previous echo performed at the hospital here    The following portions of the patient's history were reviewed and updated as appropriate: allergies, current medications, past family history, past medical history, past social history, past surgical history and problem list.     REVIEW OF SYSTEMS:   All systems reviewed.  Pertinent positives identified in HPI.  All other systems are negative.    Past Medical History:   Diagnosis Date    Allergic     Penniciilen sulphur clindamycin    Deep vein thrombosis 2020    Spleen infarction    Diabetes mellitus     type 2    History of medical problems 2005    Cpap and oxygen at night sleep apnea    Hyperlipidemia     Neuromuscular disorder     Neuropathy feet    CHRISTIANA (obstructive sleep apnea)     PAD (peripheral artery disease)     Peripheral vascular  disease of lower extremity     Pulmonary nodule, left     Splenic infarction     UTI (urinary tract infection)        Family History   Problem Relation Age of Onset    Hypertension Mother     Diabetes Mother     Depression Mother     Cancer Mother     Alcohol abuse Mother     COPD Father         Mesothelioma from job    Arthritis Father     Asthma Daughter        Social History     Socioeconomic History    Marital status: Single   Tobacco Use    Smoking status: Former     Current packs/day: 0.00     Average packs/day: 0.3 packs/day for 2.0 years (0.5 ttl pk-yrs)     Types: Cigarettes     Quit date: 2002     Years since quittin.0     Passive exposure: Past    Smokeless tobacco: Never    Tobacco comments:     Only smoked briefly for a year  a pack   Vaping Use    Vaping status: Never Used   Substance and Sexual Activity    Alcohol use: Never     Comment: caffeine use    Drug use: Never    Sexual activity: Not Currently     Partners: Male       Allergies   Allergen Reactions    Dilaudid [Hydromorphone Hcl] Nausea And Vomiting    Doxycycline Hives    Sulfa Antibiotics Hives    Penicillins Unknown - Low Severity       Past Surgical History:   Procedure Laterality Date    COLONOSCOPY           ECG 12 Lead    Date/Time: 2025 3:25 PM  Performed by: Hodan Frausto MD    Authorized by: Hodan Frausto MD  Comparison: compared with previous ECG from 9/10/2023  Similar to previous ECG  Rhythm: sinus rhythm  Rate: normal  Conduction: conduction normal  ST Segments: ST segments normal  T Waves: T waves normal  QRS axis: normal  Other findings: left ventricular hypertrophy    Clinical impression: non-specific ECG             Objective:         PHYSICAL EXAM:  GEN: VSS, no distress,   Eyes: normal sclera, normal lids and lashes  HENT: moist mucus membranes,   Respiratory: CTAB, no rales or wheezes  CV: RRR, no murmurs, , +2 DP and 2+ carotid pulses b/l  GI: NABS, soft,  Nontender, nondistended  MSK: no edema, no  scoliosis or kyphosis  Skin: no rash, warm, dry  Heme/Lymph: no bruising or bleeding  Psych: organized thought, normal behavior and affect  Neuro: Cranial nerves grossly intact, Alert and Oriented x 3.         Assessment:          Diagnosis Plan   1. Chronic chest pain with high risk for CAD  Stress Test With Myocardial Perfusion One Day             Plan:       1.  This is a 64-year-old woman with somewhat vague symptoms but she had an episode of extreme discomfort headache and severe hypertension which lasted about 3 days.  Since then she has had no further hypertension but has had a lot of fatigue and some discomfort in her breast and left arm.  Given her history of insulin-dependent diabetes I think it is best to proceed with nuclear stress test.  An echocardiogram was performed in outside hospital which showed no significant wall motion with a preserved EF and normal diastology.  Minimal valvular disease would not be the cause of the symptoms.    Dr. Marks thank you very much for referring this kind patient to me. Please call me with any questions or concerns. I will see the patient again in the office in 3 months.          Hodan Frausto MD  04/18/25  [facility]    Outpatient Encounter Medications as of 4/18/2025   Medication Sig Dispense Refill    Alcohol Swabs (ALCOHOL PADS) 70 % pads 30 pads Daily. 30 each 3    aspirin 325 MG tablet Take 1 tablet by mouth Daily.      Blood Glucose Monitoring Suppl (FREESTYLE LITE) device 1 Device Every Morning. 1 each 0    glucose blood (FREESTYLE LITE) test strip 1 each by Other route Every Morning. Use as instructed, E11.9 100 each 12    insulin aspart (novoLOG FLEXPEN) 100 UNIT/ML solution pen-injector sc pen Inject  under the skin into the appropriate area as directed 3 (Three) Times a Day With Meals.      Insulin Pen Needle (PEN NEEDLES) 32G X 4 MM misc Inject 1 each under the skin into the appropriate area as directed Every Morning. 100 each 3     "Insulin Syringe 31G X 5/16\" 0.3 ML misc Inject 1 each under the skin into the appropriate area as directed Every Morning. Please 100 each 3    Lancets (FREESTYLE) lancets 1 each by Other route Every Morning. Use as instructed 100 each 12    Multiple Vitamins-Minerals (MULTIVITAMIN ADULT PO) Take 1 tablet by mouth Daily.       No facility-administered encounter medications on file as of 4/18/2025.     "

## 2025-04-22 ENCOUNTER — TELEPHONE (OUTPATIENT)
Dept: INTERNAL MEDICINE | Facility: CLINIC | Age: 65
End: 2025-04-22
Payer: MEDICARE

## 2025-04-22 NOTE — TELEPHONE ENCOUNTER
Caller: Narcisa Hammer    Relationship: Self    Best call back number: [phone]     What is the best time to reach you: ANYTIME    Who are you requesting to speak with (clinical staff, provider,  specific staff member): CLINICAL    What was the call regarding: PATIENT CALLING WANTING TO KNOW IF  COULD FILL OUT A HANDICAP STICKER FORM FOR HER NEUROPATHY IN HER FEET SHE IS UNABLE TO WALK LONG DISTANCES.    Is it okay if the provider responds through MyChart: NO

## 2025-04-24 ENCOUNTER — TELEPHONE (OUTPATIENT)
Dept: CARDIOLOGY | Age: 65
End: 2025-04-24
Payer: MEDICARE

## 2025-04-25 ENCOUNTER — TELEPHONE (OUTPATIENT)
Dept: CARDIOLOGY | Age: 65
End: 2025-04-25

## 2025-04-25 DIAGNOSIS — R07.9 CHRONIC CHEST PAIN WITH HIGH RISK FOR CAD: Primary | ICD-10-CM

## 2025-04-25 DIAGNOSIS — Z91.89 CHRONIC CHEST PAIN WITH HIGH RISK FOR CAD: Primary | ICD-10-CM

## 2025-04-25 DIAGNOSIS — G89.29 CHRONIC CHEST PAIN WITH HIGH RISK FOR CAD: Primary | ICD-10-CM

## 2025-04-25 NOTE — TELEPHONE ENCOUNTER
Caller: Narcisa Hammer    Relationship to patient: Self    Best call back number: [phone]    Patient is needing: PT WANTS TO CHANGE STRESS TEST FROM A NUCLEAR TO A TREADMILL TEST. PT DOES NOT WANT TO BE INJECTED WITH DYE. WAS DISCUSSED WITH DR. PARK.  PLEASE CALL PT TO RSD TO A TREADMILL STRESS TEST.

## 2025-04-25 NOTE — TELEPHONE ENCOUNTER
Spoke to patient. She states she does not want to be injected with anything. She likes to do thinks naturally. She really wants a treadmill stress test. She said she can walk. I told her I would discuss this further with you and call her back.     Valery Dumont RN  Triage [facility]    Positioning (Leave Blank If You Do Not Want): The patient was placed in a comfortable position exposing the surgical site.

## 2025-04-25 NOTE — TELEPHONE ENCOUNTER
Notified patient.     Scheduling: please get patient scheduled for treadmill stress test.     Valery Dumont RN  Triage [facility]

## 2025-04-25 NOTE — TELEPHONE ENCOUNTER
Hub staff attempted to follow warm transfer process and was unsuccessful     Caller: Narcisa Hammer    Relationship to patient: Self    Best call back number: [phone]    Patient is needing: PT IS CALLING JUST TO DOUBLE CHECK THAT THE STRESS TEST SCHEDULED FOR TODAY WAS A NORMAL STRESS TEST AND DIDN'T USE THE DYE. SHE SAID SHE DIDN'T WANT TO DO THE STRESS TEST THAT USED THE DYE. TRIED TO WT PT BUT RECEIVED A BUSY TONE. PLEASE CALL PT

## 2025-05-08 ENCOUNTER — TELEPHONE (OUTPATIENT)
Dept: CARDIOLOGY | Age: 65
End: 2025-05-08
Payer: MEDICARE

## 2025-05-09 ENCOUNTER — OFFICE VISIT (OUTPATIENT)
Dept: SLEEP MEDICINE | Facility: HOSPITAL | Age: 65
End: 2025-05-09
Payer: MEDICARE

## 2025-05-09 ENCOUNTER — HOSPITAL ENCOUNTER (OUTPATIENT)
Dept: CARDIOLOGY | Facility: HOSPITAL | Age: 65
Discharge: HOME | End: 2025-05-09
Payer: MEDICARE

## 2025-05-09 VITALS — HEIGHT: 68 IN | HEART RATE: 85 BPM | BODY MASS INDEX: 36.53 KG/M2 | WEIGHT: 241 LBS | OXYGEN SATURATION: 95 %

## 2025-05-09 DIAGNOSIS — E66.01 CLASS 2 SEVERE OBESITY WITH SERIOUS COMORBIDITY AND BODY MASS INDEX (BMI) OF 36.0 TO 36.9 IN ADULT, UNSPECIFIED OBESITY TYPE: ICD-10-CM

## 2025-05-09 DIAGNOSIS — E66.812 CLASS 2 SEVERE OBESITY WITH SERIOUS COMORBIDITY AND BODY MASS INDEX (BMI) OF 36.0 TO 36.9 IN ADULT, UNSPECIFIED OBESITY TYPE: ICD-10-CM

## 2025-05-09 DIAGNOSIS — G89.29 CHRONIC CHEST PAIN WITH HIGH RISK FOR CAD: ICD-10-CM

## 2025-05-09 DIAGNOSIS — R07.9 CHRONIC CHEST PAIN WITH HIGH RISK FOR CAD: ICD-10-CM

## 2025-05-09 DIAGNOSIS — Z91.89 CHRONIC CHEST PAIN WITH HIGH RISK FOR CAD: ICD-10-CM

## 2025-05-09 DIAGNOSIS — G47.33 OSA (OBSTRUCTIVE SLEEP APNEA): Primary | ICD-10-CM

## 2025-05-09 LAB
BH CV STRESS BP STAGE 1: NORMAL
BH CV STRESS BP STAGE 2: NORMAL
BH CV STRESS DURATION MIN STAGE 1: 3
BH CV STRESS DURATION MIN STAGE 2: 2
BH CV STRESS DURATION SEC STAGE 1: 0
BH CV STRESS DURATION SEC STAGE 2: 59
BH CV STRESS GRADE STAGE 1: 10
BH CV STRESS GRADE STAGE 2: 12
BH CV STRESS HR STAGE 1: 140
BH CV STRESS HR STAGE 2: 162
BH CV STRESS METS STAGE 1: 5
BH CV STRESS METS STAGE 2: 7.5
BH CV STRESS PROTOCOL 1: NORMAL
BH CV STRESS RECOVERY BP: NORMAL MMHG
BH CV STRESS RECOVERY HR: 97 BPM
BH CV STRESS SPEED STAGE 1: 1.7
BH CV STRESS SPEED STAGE 2: 2.5
BH CV STRESS STAGE 1: 1
BH CV STRESS STAGE 2: 2
MAXIMAL PREDICTED HEART RATE: 154 BPM
PERCENT MAX PREDICTED HR: 105.19 %
STRESS BASELINE BP: NORMAL MMHG
STRESS BASELINE HR: 103 BPM
STRESS PERCENT HR: 124 %
STRESS POST ESTIMATED WORKLOAD: 7.1 METS
STRESS POST EXERCISE DUR MIN: 5 MIN
STRESS POST EXERCISE DUR SEC: 59 SEC
STRESS POST PEAK BP: NORMAL MMHG
STRESS POST PEAK HR: 162 BPM
STRESS TARGET HR: 131 BPM

## 2025-05-09 PROCEDURE — 1159F MED LIST DOCD IN RCRD: CPT | Performed by: NURSE PRACTITIONER

## 2025-05-09 PROCEDURE — 93017 CV STRESS TEST TRACING ONLY: CPT

## 2025-05-09 PROCEDURE — G0463 HOSPITAL OUTPT CLINIC VISIT: HCPCS

## 2025-05-09 PROCEDURE — 99204 OFFICE O/P NEW MOD 45 MIN: CPT | Performed by: NURSE PRACTITIONER

## 2025-05-09 PROCEDURE — 1160F RVW MEDS BY RX/DR IN RCRD: CPT | Performed by: NURSE PRACTITIONER

## 2025-05-09 NOTE — PROGRESS NOTES
[facility]  [address]  [address]  [place] [address]  Phone   Fax      Narcisa Hammer  [id number]   1960  64 y.o.  female      PCP:Woo Marks MD    Type of service: Initial New Patient Office Visit  Date of service: 5/9/2025          CHIEF COMPLAINT: Obstructive sleep apnea      HISTORY OF PRESENT ILLNESS:  Narcisa Hammer 64 y.o.  presents to the [facility] today as a new patient.  Patient has a history of type 2 diabetes, splenic artery aneurysm, for which the patient follows with outside providers.  Patient has no history of tonsillectomy, adenoidectomy, nasal surgery, UPPP.     Patient presents today to establish care for treatment and management of obstructive sleep apnea.  Patient reports she was diagnosed with sleep apnea around 2008 in [place].  She has been maintained on CPAP therapy.  She reports that a few years ago she had a lung infection and was told she had some possible atelectasis in lung bases and overnight oximetry showed nocturnal hypoxia and she has been feeding 2 L nasal cannula O2 into her PAP device since that time, per her pulmonary provider who was in [place].  Denies having titration study at that time.   She reports her breathing has significantly improved over the last year and she does not currently follow with a pulmonary provider.  No daytime oxygen use and she reports pulse ox at home stays normal.  Mouth dry with the O2, very uncomfortable.  Wants to try chin strap with nasal mask rather than FFM to see if this helps with dry mouth.  Recent chest x-ray 12/2024 showed no atelectasis.  Recent chest CT also did not mention any.   No daytime O2.  Denies history of COPD.  Smoked maybe a half a pack of cigarettes from ages [age].  No current nicotine use.  No current respiratory symptoms.  She does report that she went to the ER after having elevated blood pressure after waking up from a nightmare at which  point she had not used nocturnal oxygen at night.  She ended up having high blood pressure for a few days.  She now follows with cardiology and is to have additional testing per cardiology.  She is needing to qualify for new PAP device, reports hers is over 10 years old.  She likes the ResMed device and would like to stick with this brand.  Denies significant weight changes since having sleep study.  Still strongly feels she benefits from device, if she falls asleep without it on she will wake up gasping.          PATIENT HISTORY:  Sleep schedule:  Bedtime: 11 PM to 1 AM  Wake time: 8 to 10 AM  Time it takes to fall asleep: 20 minutes  Average hours of sleep: 6-8  Number of naps per day: 0-1    Symptoms:  In addition to the above, patient reports:   Have you ever awakened gasping for breath, coughing, choking: Yes   Change in weight:  No   Morning headaches:  No   Awaken with a sore throat or dry mouth:  Yes   Leg jerking at night:  No   Creepy crawly feeling in legs/urge to move legs: No   Teeth grinding: No   Have you ever awakened at night with a sour taste or burning sensation in your chest:  No   Do you have muscle weakness with laughing or anger:  No   Have you ever felt paralyzed while going to sleep or waking up:  Yes   Sleepwalking: No   Nightmares: No   Nocturia (urination at night): 1 times per night  Memory Problem: No     Past medical history: (Relevant to sleep medicine)  Type 2 diabetes   Hyperlipidemia  History of splenic artery aneurysm  Obstructive sleep apnea      Social history:  Shift work:  No   Tobacco use: Former, ages [age]  Alcohol use:  0 per week  Caffeinated drinks: 1 per day   Drug use: No        Family history (parents, siblings, children) (relevant to sleep medicine):  Obesity    Medications: reviewed     ALLERGIES: Dilaudid [hydromorphone hcl], Doxycycline, Sulfa antibiotics, and Penicillins        REVIEW OF SYSTEMS:  Full review of systems available on the intake form which is  "scanned in the media tab.  The relevant positives are noted below:  [place] Sleepiness Scale: Total score: 4   Fatigue  Snoring  Ankle swelling: Following a cardiology        PHYSICAL EXAM:  Vitals:    05/09/25 1311   Pulse: 85   SpO2: 95%   Weight: 109 kg (241 lb)   Height: 172.7 cm (68\")    Body mass index is 36.64 kg/m². Neck Circumference: 14.75 inches  HEAD: atraumatic, normocephalic  NECK: Neck Circumference: 14.75 inches, trachea is in the midline  RESPIRATORY SYSTEM: Respirations even, unlabored, normal rate  CARDIOVASULAR SYSTEM: Normal rate  EXTREMITES: No cyanosis or clubbing  NEUROLOGICAL SYSTEM: Alert and oriented x 3    Office notes from care team reviewed. Office note(s) reviewed: 4/2025 internal medicine note reviewed      Labs/ Test Results Reviewed:     3/2025 CMP reviewed.  CO2 level within normal limits.  2020 echocardiogram reviewed.  EF within normal limits at 61 to 65%.          DATA REVIEWED:   The PAP compliance summary downloaded on 5/8/2025 has been reviewed independently by me and discussed with the patient.   Compliance: 100%  More than 4 hr use: 98%  Average use of the device: 6 hours 43 minutes per night  Residual AHI: 1/hr (goal < 5.0 /hr)  Device: ResMed air sense 10  Mask type: Fullface  SHANTAL: Jovana            ASSESSMENT AND PLAN:   Obstructive sleep apnea: Will request copy of previous sleep study to determine severity.  Patient denies significant weight loss since previous study.  Still clearly feels she needs PAP device, if she falls asleep without it she will wake up gasping.  Her device download today shows great usage of device with residual AHI within normal limits at 1/h.  Currently on set pressure of 10 cm H2O with EPR full-time at 3.  Does have intermittent mask leak.  She was placed on 2 L nocturnal oxygen a few years ago at which point she reports she had a respiratory infection and was told that she might have some atelectasis in her lung bases.  She has subsequently " noticed improvement in her respiratory symptoms over the last few years, especially over the last year.  Not currently having dyspnea symptoms like she did in the past.  Chest x-ray 12/2024 showed no evidence of atelectasis.  We discussed doing titration study if able to obtain copy of previous sleep study, versus split-night study if unable to obtain copy of previous sleep study.  Will see during titration study whether patient still requires nocturnal oxygen with PAP use or not.  In the meantime patient will continue to use the PAP device with the nocturnal oxygen as she has been doing.  She may also want to try a nasal mask with chinstrap with her new device.  Her device is over 10 years old and still functioning but she is needing replacement. ADDENDUM: AHI 30.7 on 2008 sleep study.  Will proceed with titration study to further evaluate appropriate PAP pressures and whether O2 needed with use of PAP device since she has recovered from lung infection that she had a few years ago.  Severe Obesity: Body mass index is 36.64 kg/m².. Patients who are overweight or obese are at increased risk of sleep apnea/ sleep disordered breathing. Weight reduction and healthy lifestyle are encouraged in overweight/ obese patients as part of a comprehensive approach to sleep apnea treatment.      Patient will follow-up after study, 30 to 90 days after starting new PAP device, or sooner for issues or concerns.  Patient's questions were answered.        Thank you for allowing me to participate in the care of this patient.    Evie Cano DNP, [facility]

## 2025-05-12 DIAGNOSIS — G47.33 OSA (OBSTRUCTIVE SLEEP APNEA): Primary | ICD-10-CM

## 2025-06-13 ENCOUNTER — TELEPHONE (OUTPATIENT)
Dept: SLEEP MEDICINE | Facility: HOSPITAL | Age: 65
End: 2025-06-13
Payer: MEDICARE

## 2025-06-13 DIAGNOSIS — G47.33 OSA (OBSTRUCTIVE SLEEP APNEA): Primary | ICD-10-CM

## 2025-07-21 ENCOUNTER — PATIENT MESSAGE (OUTPATIENT)
Dept: INTERNAL MEDICINE | Facility: CLINIC | Age: 65
End: 2025-07-21
Payer: MEDICARE

## 2025-07-21 DIAGNOSIS — Z79.4 TYPE 2 DIABETES MELLITUS WITH HYPERGLYCEMIA, WITH LONG-TERM CURRENT USE OF INSULIN: Primary | ICD-10-CM

## 2025-07-21 DIAGNOSIS — E11.65 TYPE 2 DIABETES MELLITUS WITH HYPERGLYCEMIA, WITH LONG-TERM CURRENT USE OF INSULIN: Primary | ICD-10-CM

## 2025-07-21 RX ORDER — PEN NEEDLE, DIABETIC 30 GX3/16"
1 NEEDLE, DISPOSABLE MISCELLANEOUS EVERY MORNING
Qty: 100 EACH | Refills: 3 | Status: SHIPPED | OUTPATIENT
Start: 2025-07-21

## 2025-07-21 RX ORDER — PEN NEEDLE, DIABETIC 30 GX3/16"
1 NEEDLE, DISPOSABLE MISCELLANEOUS EVERY MORNING
Qty: 100 EACH | Refills: 3 | Status: SHIPPED | OUTPATIENT
Start: 2025-07-21 | End: 2025-07-21 | Stop reason: SDUPTHER

## 2025-07-21 NOTE — TELEPHONE ENCOUNTER
She needs to get labs before we refill this, as I need to see how her diabetes has been to assess if we need to increase the insulin unit amount. Can she come this week to get bloodwork? Labs are still in system, and I would like to have follow up with her once she gets the labs